# Patient Record
Sex: FEMALE | Race: WHITE | Employment: OTHER | URBAN - NONMETROPOLITAN AREA
[De-identification: names, ages, dates, MRNs, and addresses within clinical notes are randomized per-mention and may not be internally consistent; named-entity substitution may affect disease eponyms.]

---

## 2017-05-05 ENCOUNTER — OFFICE VISIT (OUTPATIENT)
Dept: NEUROLOGY | Age: 72
End: 2017-05-05
Payer: MEDICARE

## 2017-05-05 ENCOUNTER — TELEPHONE (OUTPATIENT)
Dept: NEUROLOGY | Age: 72
End: 2017-05-05

## 2017-05-05 VITALS
DIASTOLIC BLOOD PRESSURE: 76 MMHG | WEIGHT: 143 LBS | HEIGHT: 67 IN | SYSTOLIC BLOOD PRESSURE: 114 MMHG | BODY MASS INDEX: 22.44 KG/M2

## 2017-05-05 DIAGNOSIS — Z94.4 HISTORY OF LIVER TRANSPLANT (HCC): ICD-10-CM

## 2017-05-05 DIAGNOSIS — G20 PARKINSON DISEASE (HCC): Primary | ICD-10-CM

## 2017-05-05 DIAGNOSIS — Z86.79 HISTORY OF HYPERTENSION: ICD-10-CM

## 2017-05-05 RX ORDER — SPIRONOLACTONE 25 MG/1
25 TABLET ORAL DAILY
COMMUNITY

## 2017-05-05 RX ORDER — ISOSORBIDE MONONITRATE 120 MG/1
120 TABLET, EXTENDED RELEASE ORAL DAILY
COMMUNITY

## 2017-05-05 RX ORDER — MAGNESIUM GLUCONATE 27 MG(500)
500 TABLET ORAL DAILY
COMMUNITY

## 2017-05-05 RX ORDER — TRAMADOL HYDROCHLORIDE 50 MG/1
50 TABLET ORAL EVERY 6 HOURS PRN
COMMUNITY

## 2017-05-05 RX ORDER — SIROLIMUS 1 MG/1
1 TABLET, FILM COATED ORAL DAILY
COMMUNITY

## 2017-05-05 RX ORDER — CARVEDILOL 12.5 MG/1
12.5 TABLET ORAL 2 TIMES DAILY WITH MEALS
COMMUNITY

## 2017-05-05 RX ORDER — FUROSEMIDE 40 MG/1
40 TABLET ORAL 2 TIMES DAILY
COMMUNITY

## 2017-05-05 RX ORDER — ATORVASTATIN CALCIUM 20 MG/1
20 TABLET, FILM COATED ORAL DAILY
COMMUNITY

## 2017-06-02 ENCOUNTER — OFFICE VISIT (OUTPATIENT)
Dept: CARDIOLOGY | Facility: CLINIC | Age: 72
End: 2017-06-02

## 2017-06-02 VITALS
OXYGEN SATURATION: 95 % | DIASTOLIC BLOOD PRESSURE: 74 MMHG | HEART RATE: 64 BPM | WEIGHT: 147 LBS | SYSTOLIC BLOOD PRESSURE: 132 MMHG | HEIGHT: 67 IN | BODY MASS INDEX: 23.07 KG/M2

## 2017-06-02 DIAGNOSIS — I10 ESSENTIAL HYPERTENSION: ICD-10-CM

## 2017-06-02 DIAGNOSIS — I50.30 (HFPEF) HEART FAILURE WITH PRESERVED EJECTION FRACTION (HCC): Primary | ICD-10-CM

## 2017-06-02 PROCEDURE — 99213 OFFICE O/P EST LOW 20 MIN: CPT | Performed by: INTERNAL MEDICINE

## 2017-06-02 RX ORDER — TRAMADOL HYDROCHLORIDE 50 MG/1
TABLET ORAL EVERY 6 HOURS
COMMUNITY
End: 2022-08-09

## 2017-06-02 RX ORDER — MAGNESIUM GLUCONATE 27 MG(500)
TABLET ORAL
COMMUNITY

## 2017-06-02 NOTE — PROGRESS NOTES
Chief complaint : Cardiomyopathy    History of Present Illness 71-year-old female who comes today for follow-up visit.  Patient has no shortness of breath orthopnea or PND.  She denies chest pain or chest discomfort.       Review of Systems   Constitution: Negative. Negative for decreased appetite, diaphoresis, weakness, night sweats, weight gain and weight loss.   HENT: Negative for headaches, hearing loss, nosebleeds and sore throat.    Eyes: Negative.  Negative for blurred vision and photophobia.   Cardiovascular: Positive for leg swelling. Negative for chest pain, claudication, dyspnea on exertion, irregular heartbeat, palpitations, paroxysmal nocturnal dyspnea and syncope.   Respiratory: Negative for cough, hemoptysis, shortness of breath and wheezing.    Endocrine: Negative for cold intolerance, heat intolerance, polydipsia, polyphagia and polyuria.   Hematologic/Lymphatic: Negative.    Skin: Negative for color change, dry skin, flushing, itching and rash.   Musculoskeletal: Negative.  Negative for muscle cramps, muscle weakness and myalgias.   Gastrointestinal: Negative for abdominal pain, change in bowel habit, diarrhea, hematemesis, melena, nausea and vomiting.   Genitourinary: Negative for dysuria, frequency and hematuria.   Neurological: Negative for dizziness, focal weakness, light-headedness, loss of balance, numbness and seizures.   Psychiatric/Behavioral: Negative.  Negative for substance abuse, suicidal ideas and thoughts of violence.   Allergic/Immunologic: Negative.        Past Medical History:   Diagnosis Date   • Arthritis    • Backache    • Bilateral pleural effusion    • Blood in feces     Blood in feces symptom   • Capsulitis    • Cardiomyopathy      HFpEF, improved      • Chest pain    • CHF (congestive heart failure)    • Chronic anemia    • Chronic hepatitis C    • Degenerative joint disease involving multiple joints    • Dilated cardiomyopathy    • Dyslipidemia    • Dyspnea    • Edema of  "leg    • Epistaxis    • Essential hypertension    • Fibromyalgia, primary    • GERD (gastroesophageal reflux disease)    • H/O endoscopy 06/30/2014    Colon endoscopy 82210   • Headache    • Hemorrhoids     internal & external   • History of liver recipient     S/P done at [facility] 2003      • Hyperlipemia    • Hyperlipidemia    • Hypertension    • Hypokalemia    • Left bundle branch block    • Metatarsalgia     Metatarsalgia - with fat pad atrophie      • Pap smear for cervical cancer screening 02/09/1996   • Paroxysmal atrial fibrillation    • Polyp of sigmoid colon    • Salmonella arthritis    • Transient cerebral ischemia     Unspecified       Family History   Problem Relation Age of Onset   • Heart attack Father    • Stroke Father    • Cancer Other      Other   • Heart disease Other    • Hypertension Other    • Cholelithiasis Other        Codeine; Lortab [hydrocodone-acetaminophen]; and Penicillins     reports that she has never smoked. She has never used smokeless tobacco. She reports that she does not drink alcohol or use illicit drugs.    Objective     Vital Signs  6/2/17 12/1/16 8/16/16    /74 138/74 120/80   Heart Rate 64 75    SpO2 95 % 95 %    Weight 147 lb (66.7 kg) 150 lb 8 oz (68.3 kg) 159 lb 1.6 oz (72.2 kg)   Height 67\" (170.2 cm) 67\" (170.2 cm) 67.5\" (171.5 cm)   BMI (Calculated) 23 23.6 24.5   BSA (Calculated - sq m) 1.77 sq meters 1.79 sq meters 1.84 sq meters   Pain Score Zero Zero Seven         Physical Exam   Constitutional: She is oriented to person, place, and time. She appears well-developed and well-nourished.   HENT:   Head: Normocephalic and atraumatic.   Eyes: Conjunctivae and EOM are normal. Pupils are equal, round, and reactive to light.   Neck: Neck supple. No JVD present. Carotid bruit is not present. No tracheal deviation and no edema present.   Cardiovascular: Normal rate, regular rhythm, S1 normal, S2 normal, normal heart sounds and intact distal pulses.  Exam " reveals no gallop, no S3, no S4 and no friction rub.    No murmur heard.  Pulmonary/Chest: Effort normal and breath sounds normal. She has no wheezes. She has no rales. She exhibits no tenderness.   Abdominal: Bowel sounds are normal. She exhibits no abdominal bruit and no pulsatile midline mass. There is no rebound and no guarding.   Musculoskeletal: Normal range of motion. She exhibits no edema.   Neurological: She is alert and oriented to person, place, and time.   Skin: Skin is warm and dry.   Psychiatric: She has a normal mood and affect.       Procedures    Assessment/Plan     Patient Active Problem List   Diagnosis   • Paroxysmal atrial fibrillation   • (HFpEF) heart failure with preserved ejection fraction   • Essential hypertension   • Transient cerebral ischemia   ·  Liver transplantation 2003    • Left bundle branch block     1. (HFpEF) heart failure with preserved ejection fraction  Patient's last echocardiogram is from March 2014.  Her left ventricular systolic function appears to be within normal limits with estimated ejection fraction of 50-55%.  Plan:  · Continue with current guideline direct medical therapy.  · Continue with low sodium diet  · Continue with risk factor modification      2. Essential hypertension  Patient's blood pressure is well-controlled.  Plan:  · Continue with current guideline direct medical therapy  · Continue with risk factor modification  · Continue with low sodium diet    I discussed the patients findings and my recommendations with patient.          This document has been electronically signed by Abel Baltazar MD on June 25, 2017 11:28 AM     Abel Baltazar MD  06/25/17  11:25 AM

## 2017-08-07 ENCOUNTER — TELEPHONE (OUTPATIENT)
Dept: NEUROLOGY | Age: 72
End: 2017-08-07

## 2017-08-28 ENCOUNTER — HOSPITAL ENCOUNTER (OUTPATIENT)
Dept: ULTRASOUND IMAGING | Facility: HOSPITAL | Age: 72
Discharge: HOME | End: 2017-08-28
Admitting: INTERNAL MEDICINE

## 2017-08-28 DIAGNOSIS — R94.4 ABNORMAL KIDNEY FUNCTION STUDY: ICD-10-CM

## 2017-08-28 PROCEDURE — 76770 US EXAM ABDO BACK WALL COMP: CPT

## 2018-01-31 ENCOUNTER — OFFICE VISIT (OUTPATIENT)
Dept: CARDIOLOGY | Facility: CLINIC | Age: 73
End: 2018-01-31

## 2018-01-31 VITALS
DIASTOLIC BLOOD PRESSURE: 72 MMHG | HEIGHT: 67 IN | WEIGHT: 146 LBS | BODY MASS INDEX: 22.91 KG/M2 | HEART RATE: 75 BPM | SYSTOLIC BLOOD PRESSURE: 132 MMHG

## 2018-01-31 DIAGNOSIS — I10 ESSENTIAL HYPERTENSION: ICD-10-CM

## 2018-01-31 DIAGNOSIS — G45.9 TRANSIENT CEREBRAL ISCHEMIA, UNSPECIFIED TYPE: ICD-10-CM

## 2018-01-31 DIAGNOSIS — I48.0 PAROXYSMAL ATRIAL FIBRILLATION (HCC): Primary | ICD-10-CM

## 2018-01-31 DIAGNOSIS — I50.30 (HFPEF) HEART FAILURE WITH PRESERVED EJECTION FRACTION (HCC): ICD-10-CM

## 2018-01-31 DIAGNOSIS — I44.7 LEFT BUNDLE BRANCH BLOCK: ICD-10-CM

## 2018-01-31 PROCEDURE — 99213 OFFICE O/P EST LOW 20 MIN: CPT | Performed by: INTERNAL MEDICINE

## 2018-01-31 NOTE — PROGRESS NOTES
Dora Velazquez  72 y.o. female    01/31/2018  1. Paroxysmal atrial fibrillation    2. (HFpEF) heart failure with preserved ejection fraction    3. Essential hypertension    4. Transient cerebral ischemia, unspecified type    5. Left bundle branch block        History of Present Illness:Routine follow-up    72 years old patient evaluated with Dr. Baltazar in the past with history of hypertension, hypertensive heart disease, diastolic dysfunction, Parkinson disease, liver transplant and history of paroxysmal atrial fibrillation was on oral anticoagulation discontinued due to significant GI bleed more than 3-4 years ago.  She also had history of paroxysmal 3 fibrillation no further recurrence.  Patient denies orthopnea, PND, nauseous, vomiting.  Denied polydipsia polyuria.  Denies any fever cough which was dysuria or hematuria.  She last echocardiogram study reported ejection fraction 30-35% 2014.  We'll arrange an echo cardiac study to reassess the left and a systolic function.        1. Depressed left ventricular systolic function with ejection fraction        of 30% to 35%.  2. Mild concentric left ventricular hypertrophy  3. Grade 1 diastolic dysfunction of the left ventricular myocardium    SUBJECTIVE    Allergies   Allergen Reactions   • Codeine    • Lortab [Hydrocodone-Acetaminophen]    • Penicillins          Past Medical History:   Diagnosis Date   • Arthritis    • Backache    • Bilateral pleural effusion    • Blood in feces     Blood in feces symptom   • Capsulitis    • Cardiomyopathy      HFpEF, improved      • Chest pain    • CHF (congestive heart failure)    • Chronic anemia    • Chronic hepatitis C    • Degenerative joint disease involving multiple joints    • Dilated cardiomyopathy    • Dyslipidemia    • Dyspnea    • Edema of leg    • Epistaxis    • Essential hypertension    • Fibromyalgia, primary    • GERD (gastroesophageal reflux disease)    • H/O endoscopy 06/30/2014    Colon endoscopy [id number]   •  Headache    • Hemorrhoids     internal & external   • History of liver recipient     S/P done at [facility] 2003      • Hyperlipemia    • Hyperlipidemia    • Hypertension    • Hypokalemia    • Left bundle branch block    • Metatarsalgia     Metatarsalgia - with fat pad atrophie      • Pap smear for cervical cancer screening 02/09/1996   • Paroxysmal atrial fibrillation    • Polyp of sigmoid colon    • Salmonella arthritis    • Transient cerebral ischemia     Unspecified         Past Surgical History:   Procedure Laterality Date   • BACK SURGERY  1995    Back Surgery (2)      • CARDIAC CATHETERIZATION  11/03/2005    Orders Not Yet Performed: 0       • CARPAL TUNNEL RELEASE  02/12/2007    Carpal tunnel surgery (1)     • CAUTERIZATION NASAL BLEEDERS  03/09/2000    Control nose/throat bleeding (1)      • CHOLECYSTECTOMY  2000   • LAPAROSCOPIC LYSIS OF ADHESIONS  07/16/1992    Laparoscopy; lysis of adhesions (1)      • LIVER BIOPSY  01/05/1998    Needle biopsy of liver 72367 (1)      • LIVER TRANSPLANTATION  2003    Anesth, for liver transplant (1)      • SALPINGO OOPHORECTOMY  1974    Salpingo-oophorectomy (1)      • SHOULDER SURGERY  08/29/2007    Shoulder surgery procedure (1)      • TOTAL ABDOMINAL HYSTERECTOMY  1968    Total abd hysterectomy (1)            Family History   Problem Relation Age of Onset   • Heart attack Father    • Stroke Father    • Cancer Other      Other   • Heart disease Other    • Hypertension Other    • Cholelithiasis Other          Social History     Social History   • Marital status:      Spouse name: N/A   • Number of children: N/A   • Years of education: N/A     Occupational History   • Not on file.     Social History Main Topics   • Smoking status: Never Smoker   • Smokeless tobacco: Never Used   • Alcohol use No   • Drug use: No   • Sexual activity: Not on file     Other Topics Concern   • Not on file     Social History Narrative         Current Outpatient Prescriptions  "  Medication Sig Dispense Refill   • aspirin 81 MG tablet Take 81 mg by mouth daily.     • atorvastatin (LIPITOR) 40 MG tablet Take 40 mg by mouth every night.     • carbidopa-levodopa (SINEMET)  MG per tablet Take 1/2 pill each am and afternoon for a week, then 1 each am and afternoon     • carvedilol (COREG) 25 MG tablet Take 25 mg by mouth 2 (two) times a day.     • furosemide (LASIX) 40 MG tablet Take 40 mg by mouth daily.     • gabapentin (NEURONTIN) 100 MG capsule Take 100 mg by mouth daily.     • isosorbide mononitrate (IMDUR) 120 MG 24 hr tablet Take 120 mg by mouth daily.     • magnesium gluconate (MAGONATE) 500 MG tablet Take  by mouth.     • sirolimus (RAPAMUNE) 0.5 MG tablet tablet Take 1 mg by mouth.     • spironolactone (ALDACTONE) 50 MG tablet Take 50 mg by mouth every morning.     • traMADol (ULTRAM) 50 MG tablet Take  by mouth Every 6 (Six) Hours.       No current facility-administered medications for this visit.          OBJECTIVE    /72  Pulse 75  Ht 170.2 cm (67.01\")  Wt 66.2 kg (146 lb)  BMI 22.86 kg/m2        Review of Systems     Constitutional:  Denies recent weight loss, weight gain, fever or chills, no change in exercise tolerance     HENT:  Denies any hearing loss, epistaxis, hoarseness, or difficulty speaking.     Eyes: Wears eyeglasses or contact lenses     Respiratory:  Denies dyspnea with exertion,no cough, wheezing, or hemoptysis.     Cardiovascular: See H&P    Gastrointestinal: History of liver transplant and GI bleeding.     Endocrine: Negative for cold intolerance, heat intolerance, polydipsia, polyphagia and polyuria. Denies any history of weight change, heat/cold intolerance, polydipsia, polyuria     Genitourinary: Negative.      Musculoskeletal: Denies any history of arthritic symptoms or back problems     Skin:  Denies any change in hair or nails, rashes, or skin lesions.     Allergic/Immunologic: Negative.  Negative for environmental allergies, food allergies " and immunocompromised state.     Neurological:  Parkinson disease    Hematological: Denies any food allergies, seasonal allergies, bleeding disorders, or lymphadenopathy.     Psychiatric/Behavioral: Denies any history of depression, substance abuse, or change in cognitive function.         Physical Exam     Constitutional: Cooperative, alert and oriented, well-developed, well-nourished, in no acute distress.     HENT:   Head: Normocephalic, normal hair patterns, no masses or tenderness.  Ears, Nose, and Throat: No gross abnormalities. No pallor or cyanosis. Dentition good.   Eyes: EOMS intact, PERRL, conjunctivae and lids unremarkable. Fundoscopic exam and visual fields not performed.   Neck: No palpable masses or adenopathy, no thyromegaly, no JVD, carotid pulses are full and equal bilaterally and without  Bruits.     Cardiovascular: Regular rhythm, S1 and S2 normal, no S3 or S4. Apical impulse not displaced. No murmurs, gallops, or rubs detected.     Pulmonary/Chest: Chest: normal symmetry, no tenderness to palpation, normal respiratory excursion, no intercostal retraction, no use of accessory muscles.            Pulmonary: Normal breath sounds. No rales or ronchi.    Abdominal: Abdomen soft, bowel sounds normoactive, no masses, no hepatosplenomegaly, non-tender, no bruits.     Musculoskeletal: No deformities, clubbing, cyanosis, erythema, or edema observed. There are no spinal abnormalities noted. Normal muscle strength and tone. Pulses full and equal in all extremities, no bruits auscultated.     Neurological: No gross motor or sensory deficits noted, affect appropriate, oriented to time, person, place.     Skin: Warm and dry to the touch, no apparent skin lesions or masses noted.     Psychiatric: She has a normal mood and affect. Her behavior is normal. Judgment and thought content normal.         Procedures      Lab Results   Component Value Date    WBC 9.8 11/13/2014    HGB 10.7 (L) 11/13/2014    HCT 34.7 (L)  11/13/2014    MCV 87.4 11/13/2014     11/13/2014     Lab Results   Component Value Date    GLUCOSE 102 (H) 11/15/2014    BUN 21 11/15/2014    CREATININE 1.1 (H) 11/15/2014    CO2 33 (H) 11/15/2014    CALCIUM 9.0 11/15/2014    ALBUMIN 3.0 (L) 11/13/2014    AST 17 11/13/2014    ALT 23 11/13/2014     No results found for: CHOL  No results found for: TRIG  No results found for: HDL  No results found for: LDLCALC  No results found for: LDL  No results found for: HDLLDLRATIO  No components found for: CHOLHDL  No results found for: HGBA1C  No results found for: TSH, U2GBXLR, N7ZCKWA, THYROIDAB        ASSESSMENT AND PLAN  Congestive heart failure compensated due to systolic and diastolic dysfunction#2 hypertension hypertensive heart disease #3 paroxysmal atrial fibrillation no further recurrence #4 Parkinson disease #5 status post liver transplant    Lately, no sign of any cardiac decompensation based on the clinical history physical finding.  Evidence of ongoing ischemia at the time of evaluation.  She is a pleased with the clinical outcome.  She had history of for GI bleed requiring discontinuation of oral intake ablation more than 3-4 years ago.  At present recommend to continue atorvastatin for management of hyperlipidemia, carbidopa 4 Parkinson disease, carvedilol and Lasix / aldactone with history of congestive heart failure on the basis of systolic and diastolic dysfunction compensated.  We'll see her back in 6 month R depends on patient clinical conditions.    Dora was seen today for follow-up.    Diagnoses and all orders for this visit:    Paroxysmal atrial fibrillation    (HFpEF) heart failure with preserved ejection fraction    Essential hypertension    Transient cerebral ischemia, unspecified type    Left bundle branch block        Lo Jade MD  1/31/2018  10:02 AM

## 2018-04-26 ENCOUNTER — OFFICE VISIT (OUTPATIENT)
Dept: NEUROLOGY | Age: 73
End: 2018-04-26
Payer: MEDICARE

## 2018-04-26 VITALS
BODY MASS INDEX: 22.29 KG/M2 | HEIGHT: 67 IN | DIASTOLIC BLOOD PRESSURE: 81 MMHG | WEIGHT: 142 LBS | HEART RATE: 71 BPM | SYSTOLIC BLOOD PRESSURE: 118 MMHG

## 2018-04-26 DIAGNOSIS — Z86.79 HISTORY OF HYPERTENSION: ICD-10-CM

## 2018-04-26 DIAGNOSIS — G20 PARKINSON DISEASE (HCC): Primary | ICD-10-CM

## 2018-04-26 DIAGNOSIS — Z94.4 HISTORY OF LIVER TRANSPLANT (HCC): ICD-10-CM

## 2018-04-26 PROCEDURE — 4040F PNEUMOC VAC/ADMIN/RCVD: CPT | Performed by: PSYCHIATRY & NEUROLOGY

## 2018-04-26 PROCEDURE — G8400 PT W/DXA NO RESULTS DOC: HCPCS | Performed by: PSYCHIATRY & NEUROLOGY

## 2018-04-26 PROCEDURE — 1123F ACP DISCUSS/DSCN MKR DOCD: CPT | Performed by: PSYCHIATRY & NEUROLOGY

## 2018-04-26 PROCEDURE — 1036F TOBACCO NON-USER: CPT | Performed by: PSYCHIATRY & NEUROLOGY

## 2018-04-26 PROCEDURE — 3017F COLORECTAL CA SCREEN DOC REV: CPT | Performed by: PSYCHIATRY & NEUROLOGY

## 2018-04-26 PROCEDURE — G8420 CALC BMI NORM PARAMETERS: HCPCS | Performed by: PSYCHIATRY & NEUROLOGY

## 2018-04-26 PROCEDURE — 99214 OFFICE O/P EST MOD 30 MIN: CPT | Performed by: PSYCHIATRY & NEUROLOGY

## 2018-04-26 PROCEDURE — 1090F PRES/ABSN URINE INCON ASSESS: CPT | Performed by: PSYCHIATRY & NEUROLOGY

## 2018-04-26 PROCEDURE — G8427 DOCREV CUR MEDS BY ELIG CLIN: HCPCS | Performed by: PSYCHIATRY & NEUROLOGY

## 2018-07-18 ENCOUNTER — OFFICE VISIT (OUTPATIENT)
Dept: CARDIOLOGY | Facility: CLINIC | Age: 73
End: 2018-07-18

## 2018-07-18 VITALS
WEIGHT: 144.44 LBS | HEART RATE: 82 BPM | HEIGHT: 65 IN | DIASTOLIC BLOOD PRESSURE: 70 MMHG | SYSTOLIC BLOOD PRESSURE: 128 MMHG | OXYGEN SATURATION: 97 % | BODY MASS INDEX: 24.07 KG/M2

## 2018-07-18 DIAGNOSIS — I44.7 LEFT BUNDLE BRANCH BLOCK: ICD-10-CM

## 2018-07-18 DIAGNOSIS — E78.5 HYPERLIPIDEMIA, UNSPECIFIED HYPERLIPIDEMIA TYPE: ICD-10-CM

## 2018-07-18 DIAGNOSIS — I50.30 (HFPEF) HEART FAILURE WITH PRESERVED EJECTION FRACTION (HCC): ICD-10-CM

## 2018-07-18 DIAGNOSIS — I10 ESSENTIAL HYPERTENSION: ICD-10-CM

## 2018-07-18 DIAGNOSIS — I48.0 PAROXYSMAL ATRIAL FIBRILLATION (HCC): Primary | ICD-10-CM

## 2018-07-18 PROCEDURE — 99213 OFFICE O/P EST LOW 20 MIN: CPT | Performed by: INTERNAL MEDICINE

## 2018-07-18 RX ORDER — ALLOPURINOL 100 MG/1
100 TABLET ORAL 3 TIMES DAILY
COMMUNITY
End: 2022-08-01

## 2018-10-16 ENCOUNTER — DOCUMENTATION (OUTPATIENT)
Dept: CARDIOLOGY | Facility: CLINIC | Age: 73
End: 2018-10-16

## 2018-10-17 ENCOUNTER — DOCUMENTATION (OUTPATIENT)
Dept: CARDIOLOGY | Facility: CLINIC | Age: 73
End: 2018-10-17

## 2018-10-17 NOTE — PROGRESS NOTES
I faxed patient lab orders to [facility] today per Patient request she was there to get them drawn.

## 2018-10-31 DIAGNOSIS — E78.5 HYPERLIPIDEMIA, UNSPECIFIED HYPERLIPIDEMIA TYPE: Primary | ICD-10-CM

## 2019-04-18 DIAGNOSIS — I48.0 PAROXYSMAL ATRIAL FIBRILLATION (HCC): Primary | ICD-10-CM

## 2019-04-19 ENCOUNTER — OFFICE VISIT (OUTPATIENT)
Dept: CARDIOLOGY | Facility: CLINIC | Age: 74
End: 2019-04-19

## 2019-04-19 VITALS
BODY MASS INDEX: 23.49 KG/M2 | OXYGEN SATURATION: 98 % | WEIGHT: 141 LBS | HEIGHT: 65 IN | HEART RATE: 99 BPM | DIASTOLIC BLOOD PRESSURE: 68 MMHG | SYSTOLIC BLOOD PRESSURE: 120 MMHG

## 2019-04-19 DIAGNOSIS — I44.7 LEFT BUNDLE BRANCH BLOCK: ICD-10-CM

## 2019-04-19 DIAGNOSIS — G45.9 TRANSIENT CEREBRAL ISCHEMIA, UNSPECIFIED TYPE: ICD-10-CM

## 2019-04-19 DIAGNOSIS — I50.30 (HFPEF) HEART FAILURE WITH PRESERVED EJECTION FRACTION (HCC): ICD-10-CM

## 2019-04-19 DIAGNOSIS — I48.0 PAROXYSMAL ATRIAL FIBRILLATION (HCC): Primary | ICD-10-CM

## 2019-04-19 DIAGNOSIS — I10 ESSENTIAL HYPERTENSION: ICD-10-CM

## 2019-04-19 DIAGNOSIS — E78.5 HYPERLIPIDEMIA, UNSPECIFIED HYPERLIPIDEMIA TYPE: ICD-10-CM

## 2019-04-19 PROCEDURE — 93000 ELECTROCARDIOGRAM COMPLETE: CPT | Performed by: INTERNAL MEDICINE

## 2019-04-19 PROCEDURE — 99214 OFFICE O/P EST MOD 30 MIN: CPT | Performed by: INTERNAL MEDICINE

## 2019-04-19 NOTE — PROGRESS NOTES
Dora Velazquez  73 y.o. female    04/19/2019  1. Paroxysmal atrial fibrillation (CMS/HCC)    2. (HFpEF) heart failure with preserved ejection fraction (CMS/HCC)    3. Essential hypertension    4. Transient cerebral ischemia, unspecified type    5. Hyperlipidemia, unspecified hyperlipidemia type    6. Left bundle branch block        History of Present Illness:    Patient's Body mass index is 23.46 kg/m². BMI is within normal parameters. No follow-up required.   .  73 years old patient evaluated by Dr. Baltazar in the past with history of hypertension, hypertensive heart disease, diastolic dysfunction, Parkinson disease, liver transplant did remarkably well and history of paroxysmal atrial fibrillation was on oral anticoagulation discontinued due to significant GI bleed more than 3-4 years ago.  She also had history of paroxysmal atrial fibrillation no further recurrence.  Patient denies orthopnea, PND, nauseous, vomiting.  Denied polydipsia polyuria.  Denies any fever cough which was dysuria or hematuria.   last echocardiogram study reported ejection fraction 30-35% 2014.Repeat  echo has ejection fraction improved to 45%  We'll arrange an echo cardiac study to reassess the left and a systolic function.     ECHO 7/11/2018  ·  Left atrial cavity size is mildly dilated.  · The left ventricular cavity is mildly dilated.  · Left ventricular systolic function is low normal. Estimated EF = 45%.  · Left ventricular diastolic dysfunction (grade I a) consistent with impaired relaxation.  Left ventricular wall thickness is consistent with mild concentric hypertrophy      ECHO 2014  1. Depressed left ventricular systolic function with ejection fraction        of 30% to 35%.  2. Mild concentric left ventricular hypertrophy  3. Grade 1 diastolic dysfunction of the left ventricular myocardium            SUBJECTIVE:    Allergies   Allergen Reactions   • Lortab [Hydrocodone-Acetaminophen] Other (See Comments)     Can cause problems  for liver has had transplant    • Penicillins Anaphylaxis and Hives   • Codeine Hives         Past Medical History:   Diagnosis Date   • Arthritis    • Backache    • Bilateral pleural effusion    • Blood in feces     Blood in feces symptom   • Capsulitis    • Cardiomyopathy (CMS/HCC)      HFpEF, improved      • Chest pain    • CHF (congestive heart failure) (CMS/HCC)    • Chronic anemia    • Chronic hepatitis C (CMS/HCC)    • Degenerative joint disease involving multiple joints    • Dilated cardiomyopathy (CMS/HCC)    • Dyslipidemia    • Dyspnea    • Edema of leg    • Epistaxis    • Essential hypertension    • Fibromyalgia, primary    • GERD (gastroesophageal reflux disease)    • H/O endoscopy 06/30/2014    Colon endoscopy [id number]   • Headache    • Hemorrhoids     internal & external   • History of liver recipient (CMS/HCC)     S/P done at [facility] 2003      • Hyperlipemia    • Hyperlipidemia    • Hypertension    • Hypokalemia    • Left bundle branch block    • Metatarsalgia     Metatarsalgia - with fat pad atrophie      • Pap smear for cervical cancer screening 02/09/1996   • Paroxysmal atrial fibrillation (CMS/HCC)    • Polyp of sigmoid colon    • Salmonella arthritis (CMS/HCC)    • Transient cerebral ischemia     Unspecified         Past Surgical History:   Procedure Laterality Date   • BACK SURGERY  1995    Back Surgery (2)      • CARDIAC CATHETERIZATION  11/03/2005    Orders Not Yet Performed: 0       • CARPAL TUNNEL RELEASE  02/12/2007    Carpal tunnel surgery (1)     • CAUTERIZATION NASAL BLEEDERS  03/09/2000    Control nose/throat bleeding (1)      • CHOLECYSTECTOMY  2000   • LAPAROSCOPIC LYSIS OF ADHESIONS  07/16/1992    Laparoscopy; lysis of adhesions (1)      • LIVER BIOPSY  01/05/1998    Needle biopsy of liver 13845 (1)      • LIVER TRANSPLANTATION  2003    Anesth, for liver transplant (1)      • SALPINGO OOPHORECTOMY  1974    Salpingo-oophorectomy (1)      • SHOULDER SURGERY  08/29/2007    Shoulder  surgery procedure (1)      • TOTAL ABDOMINAL HYSTERECTOMY  1968    Total abd hysterectomy (1)            Family History   Problem Relation Age of Onset   • Heart attack Father    • Stroke Father    • Cancer Other         Other   • Heart disease Other    • Hypertension Other    • Cholelithiasis Other          Social History     Socioeconomic History   • Marital status:      Spouse name: Not on file   • Number of children: Not on file   • Years of education: Not on file   • Highest education level: Not on file   Tobacco Use   • Smoking status: Never Smoker   • Smokeless tobacco: Never Used   Substance and Sexual Activity   • Alcohol use: No   • Drug use: No         Current Outpatient Medications   Medication Sig Dispense Refill   • allopurinol (ZYLOPRIM) 100 MG tablet Take 100 mg by mouth 3 (Three) Times a Day.     • aspirin 81 MG tablet Take 81 mg by mouth daily.     • atorvastatin (LIPITOR) 40 MG tablet Take 40 mg by mouth every night.     • carbidopa-levodopa (SINEMET)  MG per tablet Take 1/2 pill each am and afternoon for a week, then 1 each am and afternoon     • carvedilol (COREG) 25 MG tablet Take 25 mg by mouth 2 (two) times a day.     • furosemide (LASIX) 40 MG tablet Take 40 mg by mouth daily.     • isosorbide mononitrate (IMDUR) 120 MG 24 hr tablet Take 120 mg by mouth daily.     • magnesium gluconate (MAGONATE) 500 MG tablet Take  by mouth.     • sirolimus (RAPAMUNE) 0.5 MG tablet tablet Take 1 mg by mouth.     • spironolactone (ALDACTONE) 50 MG tablet Take 50 mg by mouth every morning.     • traMADol (ULTRAM) 50 MG tablet Take  by mouth Every 6 (Six) Hours.       No current facility-administered medications for this visit.            Review of Systems:     Constitutional:  Denies recent weight loss, weight gain, fever or chills, no change in exercise tolerance.     HENT:  Denies any hearing loss, epistaxis, hoarseness, or difficulty speaking.     Eyes: Wears eyeglasses or contact  "lenses.    Respiratory:  Denies dyspnea with exertion,no cough, wheezing, or hemoptysis.     Cardiovascular: See H&P    Gastrointestinal: History of GI bleed    Endocrine: Negative for cold intolerance, heat intolerance, polydipsia, polyphagia and polyuria. Denies any history of weight change, polydipsia, polyuria.     Genitourinary: Negative.      Musculoskeletal: Arthritis of the hip    Skin:  Denies any change in hair or nails, rashes, or skin lesions.     Allergic/Immunologic: Negative.  Negative for environmental allergies, food allergies and immunocompromised state.     Neurological:  Denies any history of recurrent headaches, positive for Parkinson disease    Hematological: Denies any food allergies, seasonal allergies, bleeding disorders, or lymphadenopathy.     Psychiatric/Behavioral: Denies any history of depression, substance abuse, or change in cognitive function.       OBJECTIVE:    /68   Pulse 99   Ht 165.1 cm (65\")   Wt 64 kg (141 lb)   LMP 07/18/1971 (Within Months)   SpO2 98%   BMI 23.46 kg/m²       Physical Exam:     Constitutional: Cooperative, alert and oriented, well-developed, well-nourished, in no acute distress.     HENT:   Head: Normocephalic, normal hair patterns, no masses or tenderness.  Ears, Nose, and Throat: No gross abnormalities. No pallor or cyanosis. Dentition good.   Eyes: EOMS intact, PERRL, conjunctivae and lids unremarkable. Fundoscopic exam and visual fields not performed.   Neck: No palpable masses or adenopathy, no thyromegaly, no JVD, carotid pulses are full and equal bilaterally and without  Bruits.     Cardiovascular: Regular rhythm, S1 and S2 normal, no S3 or S4. Apical impulse not displaced. No murmurs, gallops, or rubs detected.     Pulmonary/Chest: Chest: normal symmetry, no tenderness to palpation, normal respiratory excursion, no intercostal retraction, no use of accessory muscles. Pulmonary: Normal breath sounds. No rales or rhonchi.    Abdominal: " Abdomen soft, bowel sounds normoactive, no masses, no hepatosplenomegaly, non-tender, no bruits.     Musculoskeletal: No deformities, clubbing, cyanosis, erythema, or edema observed. There are no spinal abnormalities noted. Normal muscle strength and tone. Pulses full and equal in all extremities, no bruits auscultated.     Neurological: No gross motor or sensory deficits noted, affect appropriate, oriented to time, person, place.     Skin: Warm and dry to the touch, no apparent skin lesions or masses noted.     Psychiatric: She has a normal mood and affect. Her behavior is normal. Judgment and thought content normal.         Procedures      Lab Results   Component Value Date    WBC 9.8 11/13/2014    HGB 10.7 (L) 11/13/2014    HCT 34.7 (L) 11/13/2014    MCV 87.4 11/13/2014     11/13/2014     Lab Results   Component Value Date    GLUCOSE 102 (H) 11/15/2014    BUN 21 11/15/2014    CREATININE 1.1 (H) 11/15/2014    CO2 33 (H) 11/15/2014    CALCIUM 9.0 11/15/2014    ALBUMIN 3.0 (L) 11/13/2014    AST 17 11/13/2014    ALT 23 11/13/2014     No results found for: CHOL  No results found for: TRIG  No results found for: HDL  No components found for: LDLCALC  No results found for: LDL  No results found for: HDLLDLRATIO  No components found for: CHOLHDL  No results found for: HGBA1C  No results found for: TSH, A1FTGPY, F4YIABD, THYROIDAB        ASSESSMENT AND PLAN:  Congestive heart failure compensated due to systolic and diastolic dysfunction#2 hypertension hypertensive heart disease #3 paroxysmal atrial fibrillation no further recurrence #4 Parkinson disease #5 status post liver transplant     Clinically, no sign of any cardiac decompensation based on the clinical history physical finding.  No e vidence of ongoing ischemia at the time of evaluation.  She is a pleased with the clinical outcome.  She had history of for GI bleed requiring discontinuation of oral anticoagulation more than 3-4 years ago refused to be  restarted.  At present recommend to continue atorvastatin for management of hyperlipidemia, carbidopa for Parkinson disease, carvedilol and Lasix / aldactone with history of congestive heart failure on the basis of systolic and diastolic dysfunction compensated.  KG discussed with the patient        Dora was seen today for follow-up.    Diagnoses and all orders for this visit:    Paroxysmal atrial fibrillation (CMS/HCC)    (HFpEF) heart failure with preserved ejection fraction (CMS/HCC)    Essential hypertension    Transient cerebral ischemia, unspecified type    Hyperlipidemia, unspecified hyperlipidemia type    Left bundle branch block        Lo Jade MD  4/19/2019  1:00 PM

## 2019-06-26 ENCOUNTER — CONSULT (OUTPATIENT)
Dept: GASTROENTEROLOGY | Facility: CLINIC | Age: 74
End: 2019-06-26

## 2019-06-26 VITALS
HEIGHT: 64 IN | WEIGHT: 141.2 LBS | OXYGEN SATURATION: 97 % | SYSTOLIC BLOOD PRESSURE: 120 MMHG | BODY MASS INDEX: 24.11 KG/M2 | HEART RATE: 71 BPM | DIASTOLIC BLOOD PRESSURE: 70 MMHG

## 2019-06-26 DIAGNOSIS — Z86.010 ENCOUNTER FOR COLONOSCOPY DUE TO HISTORY OF COLONIC POLYP: Primary | ICD-10-CM

## 2019-06-26 DIAGNOSIS — Z12.11 ENCOUNTER FOR COLONOSCOPY DUE TO HISTORY OF COLONIC POLYP: Primary | ICD-10-CM

## 2019-06-26 PROCEDURE — S0260 H&P FOR SURGERY: HCPCS | Performed by: NURSE PRACTITIONER

## 2019-06-26 RX ORDER — SODIUM, POTASSIUM,MAG SULFATES 17.5-3.13G
1 SOLUTION, RECONSTITUTED, ORAL ORAL EVERY 12 HOURS
Qty: 2 BOTTLE | Refills: 0 | Status: SHIPPED | OUTPATIENT
Start: 2019-06-26 | End: 2019-08-14 | Stop reason: HOSPADM

## 2019-06-26 RX ORDER — DEXTROSE AND SODIUM CHLORIDE 5; .45 G/100ML; G/100ML
30 INJECTION, SOLUTION INTRAVENOUS CONTINUOUS PRN
Status: CANCELLED | OUTPATIENT
Start: 2019-08-14

## 2019-06-26 NOTE — PROGRESS NOTES
Chief Complaint   Patient presents with   • Recall letter       Subjective    Dora Velazquez is a 73 y.o. female. she is here today for follow-up.    History of Present Illness  73-year-old female presents to discuss screening colonoscopy.  Denies abdominal pain, nausea, vomiting or change within her bowel habits.  Previous colonoscopy was completed 6/30/2014 and noted small polyp which was removed from sigmoid colon.  Polyp was hyperplastic.  She has chronic A. Fib maintained on anticoagulation.  She has been cleared to come off prior to procedure.  Plan; schedule patient for screening colonoscopy due to personal history of hyperplastic polyp of sigmoid colon.       The following portions of the patient's history were reviewed and updated as appropriate:   Past Medical History:   Diagnosis Date   • Arthritis    • Backache    • Bilateral pleural effusion    • Blood in feces     Blood in feces symptom   • Capsulitis    • Cardiomyopathy (CMS/HCC)      HFpEF, improved      • Chest pain    • CHF (congestive heart failure) (CMS/HCC)    • Chronic anemia    • Chronic hepatitis C (CMS/HCC)    • Degenerative joint disease involving multiple joints    • Dilated cardiomyopathy (CMS/HCC)    • Dyslipidemia    • Dyspnea    • Edema of leg    • Epistaxis    • Essential hypertension    • Fibromyalgia, primary    • GERD (gastroesophageal reflux disease)    • H/O endoscopy 06/30/2014    Colon endoscopy [id number]   • Headache    • Hemorrhoids     internal & external   • History of liver recipient (CMS/HCC)     S/[facility] 2003      • Hyperlipemia    • Hyperlipidemia    • Hypertension    • Hypokalemia    • Left bundle branch block    • Metatarsalgia     Metatarsalgia - with fat pad atrophie      • Pap smear for cervical cancer screening 02/09/1996   • Paroxysmal atrial fibrillation (CMS/HCC)    • Polyp of sigmoid colon    • Salmonella arthritis (CMS/HCC)    • Transient cerebral ischemia     Unspecified     Past Surgical  History:   Procedure Laterality Date   • BACK SURGERY  1995    Back Surgery (2)      • CARDIAC CATHETERIZATION  11/03/2005    Orders Not Yet Performed: 0       • CARPAL TUNNEL RELEASE  02/12/2007    Carpal tunnel surgery (1)     • CAUTERIZATION NASAL BLEEDERS  03/09/2000    Control nose/throat bleeding (1)      • CHOLECYSTECTOMY  2000   • LAPAROSCOPIC LYSIS OF ADHESIONS  07/16/1992    Laparoscopy; lysis of adhesions (1)      • LIVER BIOPSY  01/05/1998    Needle biopsy of liver [id number] (1)      • LIVER TRANSPLANTATION  2003    Anesth, for liver transplant (1)      • SALPINGO OOPHORECTOMY  1974    Salpingo-oophorectomy (1)      • SHOULDER SURGERY  08/29/2007    Shoulder surgery procedure (1)      • TOTAL ABDOMINAL HYSTERECTOMY  1968    Total abd hysterectomy (1)        Family History   Problem Relation Age of Onset   • Heart attack Father    • Stroke Father    • Cancer Other         Other   • Heart disease Other    • Hypertension Other    • Cholelithiasis Other      OB History     No data available        Prior to Admission medications    Medication Sig Start Date End Date Taking? Authorizing Provider   allopurinol (ZYLOPRIM) 100 MG tablet Take 100 mg by mouth 3 (Three) Times a Day.   Yes Yvette Rueda MD   aspirin 81 MG tablet Take 81 mg by mouth daily.   Yes Yvette Rueda MD   atorvastatin (LIPITOR) 40 MG tablet Take 40 mg by mouth every night.   Yes ProviderYvette MD   carbidopa-levodopa (SINEMET)  MG per tablet Take 1/2 pill each am and afternoon for a week, then 1 each am and afternoon 5/8/17  Yes Yvette Rueda MD   carvedilol (COREG) 25 MG tablet Take 25 mg by mouth 2 (two) times a day.   Yes Yvette Rueda MD   furosemide (LASIX) 40 MG tablet Take 40 mg by mouth daily.   Yes Yvette Rueda MD   isosorbide mononitrate (IMDUR) 120 MG 24 hr tablet Take 120 mg by mouth daily.   Yes Yvette Rueda MD   magnesium gluconate (MAGONATE) 500 MG tablet Take  by  "mouth.   Yes Yvette Rueda MD   sirolimus (RAPAMUNE) 0.5 MG tablet tablet Take 1 mg by mouth.   Yes Yvette Rueda MD   spironolactone (ALDACTONE) 50 MG tablet Take 50 mg by mouth every morning.   Yes Yvette Rueda MD   traMADol (ULTRAM) 50 MG tablet Take  by mouth Every 6 (Six) Hours.   Yes Yvette Rueda MD     Allergies   Allergen Reactions   • Lortab [Hydrocodone-Acetaminophen] Other (See Comments)     Can cause problems for liver has had transplant    • Penicillins Anaphylaxis and Hives   • Codeine Hives     Social History     Socioeconomic History   • Marital status:      Spouse name: Not on file   • Number of children: Not on file   • Years of education: Not on file   • Highest education level: Not on file   Tobacco Use   • Smoking status: Never Smoker   • Smokeless tobacco: Never Used   Substance and Sexual Activity   • Alcohol use: No   • Drug use: No   • Sexual activity: Defer       Review of Systems  Review of Systems   Constitutional: Negative for activity change, appetite change, chills, diaphoresis, fatigue, fever and unexpected weight change.   HENT: Negative for sore throat and trouble swallowing.    Respiratory: Negative for shortness of breath.    Gastrointestinal: Negative for abdominal distention, abdominal pain, anal bleeding, blood in stool, constipation, diarrhea, nausea, rectal pain and vomiting.   Musculoskeletal: Negative for arthralgias.   Skin: Negative for pallor.   Neurological: Negative for light-headedness.        /70 (BP Location: Right arm, Patient Position: Sitting)   Pulse 71   Ht 162.6 cm (64\")   Wt 64 kg (141 lb 3.2 oz)   LMP 07/18/1971 (Within Months)   SpO2 97%   BMI 24.24 kg/m²     Objective    Physical Exam   Constitutional: She is oriented to person, place, and time. She appears well-developed and well-nourished. She is cooperative. No distress.   HENT:   Head: Normocephalic and atraumatic.   Neck: Normal range of motion. " Neck supple. No thyromegaly present.   Cardiovascular: Normal rate, regular rhythm and normal heart sounds.   Pulmonary/Chest: Effort normal and breath sounds normal. She has no wheezes. She has no rhonchi. She has no rales.   Abdominal: Soft. Normal appearance and bowel sounds are normal. She exhibits no distension. There is no hepatosplenomegaly. There is no tenderness. There is no rigidity and no guarding. No hernia.   Lymphadenopathy:     She has no cervical adenopathy.   Neurological: She is alert and oriented to person, place, and time.   Skin: Skin is warm, dry and intact. No rash noted. No pallor.   Psychiatric: She has a normal mood and affect. Her speech is normal.     Hospital Outpatient Visit on 07/10/2018   Component Date Value Ref Range Status   • BSA 07/10/2018 1.8  m^2 Final   • BH CV ECHO LAURITA - RVDD 07/10/2018 3.0  cm Final   • IVSd 07/10/2018 1.3  cm Final   • LVIDd 07/10/2018 3.5  cm Final   • LVIDs 07/10/2018 2.5  cm Final   • LVPWd 07/10/2018 1.3  cm Final   • IVS/LVPW 07/10/2018 1.0   Final   • FS 07/10/2018 28.6  % Final   • EDV(Teich) 07/10/2018 50.9  ml Final   • ESV(Teich) 07/10/2018 22.3  ml Final   • EF(Teich) 07/10/2018 56.1  % Final   • EDV(cubed) 07/10/2018 42.9  ml Final   • ESV(cubed) 07/10/2018 15.6  ml Final   • EF(cubed) 07/10/2018 63.6  % Final   • LV mass(C)d 07/10/2018 153.8  grams Final   • LV mass(C)dI 07/10/2018 86.9  grams/m^2 Final   • SV(Teich) 07/10/2018 28.5  ml Final   • SI(Teich) 07/10/2018 16.1  ml/m^2 Final   • SV(cubed) 07/10/2018 27.3  ml Final   • SI(cubed) 07/10/2018 15.4  ml/m^2 Final   • Ao root diam 07/10/2018 2.9  cm Final   • Ao root area 07/10/2018 6.6  cm^2 Final   • ACS 07/10/2018 2.0  cm Final   • LA dimension 07/10/2018 3.4  cm Final   • asc Aorta Diam 07/10/2018 2.9  cm Final   • LA/Ao 07/10/2018 1.2   Final   • LVOT diam 07/10/2018 2.0  cm Final   • LVOT area 07/10/2018 3.1  cm^2 Final   • LVOT area(traced) 07/10/2018 3.1  cm^2 Final   • Ao root  area (BSA corrected) 07/10/2018 1.6   Final   • MV E max jostin 07/10/2018 59.4  cm/sec Final   • MV A max jostin 07/10/2018 82.5  cm/sec Final   • MV E/A 07/10/2018 0.72   Final   • MV P1/2t max jostin 07/10/2018 64.7  cm/sec Final   • MV P1/2t 07/10/2018 54.8  msec Final   • MVA(P1/2t) 07/10/2018 4.0  cm^2 Final   • MV dec slope 07/10/2018 346.0  cm/sec^2 Final   • Ao pk jostin 07/10/2018 154.0  cm/sec Final   • Ao max PG 07/10/2018 9.5  mmHg Final   • Ao max PG (full) 07/10/2018 5.2  mmHg Final   • Ao V2 mean 07/10/2018 114.0  cm/sec Final   • Ao mean PG 07/10/2018 6.0  mmHg Final   • Ao mean PG (full) 07/10/2018 4.0  mmHg Final   • Ao V2 VTI 07/10/2018 28.0  cm Final   • [place](I,A) 07/10/2018 2.2  cm^2 Final   • [place](I,D) 07/10/2018 2.2  cm^2 Final   • [place](V,A) 07/10/2018 2.1  cm^2 Final   • [place](V,D) 07/10/2018 2.1  cm^2 Final   • LV V1 max PG 07/10/2018 4.3  mmHg Final   • LV V1 mean PG 07/10/2018 2.0  mmHg Final   • LV V1 max 07/10/2018 104.0  cm/sec Final   • LV V1 mean 07/10/2018 67.9  cm/sec Final   • LV V1 VTI 07/10/2018 19.6  cm Final   • MR max jostin 07/10/2018 523.0  cm/sec Final   • MR max PG 07/10/2018 109.4  mmHg Final   • SV(Ao) 07/10/2018 184.9  ml Final   • SI(Ao) 07/10/2018 104.6  ml/m^2 Final   • SV(LVOT) 07/10/2018 61.6  ml Final   • SI(LVOT) 07/10/2018 34.8  ml/m^2 Final   • PA V2 max 07/10/2018 108.0  cm/sec Final   • PA max PG 07/10/2018 4.7  mmHg Final   • TR max jostin 07/10/2018 258.0  cm/sec Final   • RVSP(TR) 07/10/2018 31.6  mmHg Final   • RAP systole 07/10/2018 5.0  mmHg Final   • MVA P1/2T LCG 07/10/2018 3.4  cm^2 Final   • BH CV ECHO LAURITA - BZI_BMI 07/10/2018 22.9  kilograms/m^2 Final   • [facility] 07/10/2018 1.8  m^2 Final   • BH CV ECHO LAURITA - BZI_METRIC_WEIGHT 07/10/2018 66.2  kg Final   • BH CV ECHO LAURITA - BZI_METRIC_HEIGHT 07/10/2018 170.2  cm Final   • Target HR (85%) 07/10/2018 125  bpm Final   • Max. Pred. HR (100%) 07/10/2018 147  bpm Final   • Echo EF Estimated 07/10/2018  45  % Final     Assessment/Plan      1. Encounter for colonoscopy due to history of colonic polyp    .       Orders placed during this encounter include:  Orders Placed This Encounter   Procedures   • Follow Anesthesia Guidelines / Standing Orders     Standing Status:   Future   • Obtain Informed Consent     Standing Status:   Future     Order Specific Question:   Informed Consent Given For     Answer:   COLONOSCOPY       COLONOSCOPY (N/A)    Review and/or summary of lab tests, radiology, procedures, medications. Review and summary of old records and obtaining of history. The risks and benefits of my recommendations, as well as other treatment options were discussed with the patient today. Questions were answered.    New Medications Ordered This Visit   Medications   • sodium-potassium-magnesium sulfates (SUPREP BOWEL PREP KIT) 17.5-3.13-1.6 GM/177ML solution oral solution     Sig: Take 1 bottle by mouth Every 12 (Twelve) Hours.     Dispense:  2 bottle     Refill:  0       Follow-up: Return in about 4 weeks (around 7/24/2019).          This document has been electronically signed by ROZ Manuel on June 26, 2019 9:04 AM             Results for orders placed or performed during the hospital encounter of 07/10/18   Adult Transthoracic Echo Complete W/ Cont if Necessary Per Protocol   Result Value Ref Range    BSA 1.8 m^2    BH CV ECHO LAURITA - RVDD 3.0 cm    IVSd 1.3 cm    LVIDd 3.5 cm    LVIDs 2.5 cm    LVPWd 1.3 cm    IVS/LVPW 1.0     FS 28.6 %    EDV(Teich) 50.9 ml    ESV(Teich) 22.3 ml    EF(Teich) 56.1 %    EDV(cubed) 42.9 ml    ESV(cubed) 15.6 ml    EF(cubed) 63.6 %    LV mass(C)d 153.8 grams    LV mass(C)dI 86.9 grams/m^2    SV(Teich) 28.5 ml    SI(Teich) 16.1 ml/m^2    SV(cubed) 27.3 ml    SI(cubed) 15.4 ml/m^2    Ao root diam 2.9 cm    Ao root area 6.6 cm^2    ACS 2.0 cm    LA dimension 3.4 cm    asc Aorta Diam 2.9 cm    LA/Ao 1.2     LVOT diam 2.0 cm    LVOT area 3.1 cm^2    LVOT area(traced) 3.1 cm^2     Ao root area (BSA corrected) 1.6     MV E max jostin 59.4 cm/sec    MV A max jostin 82.5 cm/sec    MV E/A 0.72     MV P1/2t max jostin 64.7 cm/sec    MV P1/2t 54.8 msec    MVA(P1/2t) 4.0 cm^2    MV dec slope 346.0 cm/sec^2    Ao pk jostin 154.0 cm/sec    Ao max PG 9.5 mmHg    Ao max PG (full) 5.2 mmHg    Ao V2 mean 114.0 cm/sec    Ao mean PG 6.0 mmHg    Ao mean PG (full) 4.0 mmHg    Ao V2 VTI 28.0 cm    GE[place]IA(I,A) 2.2 cm^2    [place]IA(I,D) 2.2 cm^2    [place]IA(V,A) 2.1 cm^2    [place]IA(V,D) 2.1 cm^2    LV V1 max PG 4.3 mmHg    LV V1 mean PG 2.0 mmHg    LV V1 max 104.0 cm/sec    LV V1 mean 67.9 cm/sec    LV V1 VTI 19.6 cm    MR max jostin 523.0 cm/sec    MR max .4 mmHg    SV(Ao) 184.9 ml    SI(Ao) 104.6 ml/m^2    SV(LVOT) 61.6 ml    SI(LVOT) 34.8 ml/m^2    PA V2 max 108.0 cm/sec    PA max PG 4.7 mmHg    TR max jostin 258.0 cm/sec    RVSP(TR) 31.6 mmHg    RAP systole 5.0 mmHg    MVA P1/2T LCG 3.4 cm^2     CV ECHO LAURITA - BZI_BMI 22.9 kilograms/m^2     CV ECHO LAURITA - BSA([facility]) 1.8 m^2     CV ECHO LAURITA - BZI_METRIC_WEIGHT 66.2 kg     CV ECHO LAURITA - BZI_METRIC_HEIGHT 170.2 cm    Target HR (85%) 125 bpm    Max. Pred. HR (100%) 147 bpm    Echo EF Estimated 45 %   Results for orders placed or performed in visit on 08/15/16    COLONOSCOPY   Result Value Ref Range     Colonoscopy Complete    Results for orders placed or performed during the hospital encounter of 11/12/14   CBC and Differential   Result Value Ref Range    WBC 9.8 3.2 - 9.8 x1000/uL    RBC 3.97 3.77 - 5.16 reilly/mm3    Hemoglobin 10.7 (L) 12.0 - 15.5 gm/dl    Hematocrit 34.7 (L) 35.0 - 45.0 %    MCV 87.4 80.0 - 98.0 fl    MCH 27.0 26.0 - 34.0 pg    MCHC 30.8 (L) 31.4 - 36.0 gm/dl    RDW 18.5 (H) 11.5 - 14.5 %    Platelets 276 150 - 450 x1000/mm3    MPV 10.5 8.0 - 12.0 fl    Neutrophil Rel % 71.5 37.0 - 80.0 %    Lymphocyte Rel % 16.8 10.0 - 50.0 %    Monocyte Rel % 7.3 0.0 - 12.0 %    Eosinophil Rel % 3.8 0.0 - 7.0 %    Basophil Rel % 0.2 0.0 - 2.0 %    Immature Granulocyte  Rel % 0.40 0.00 - 0.50 %    Neutrophils Absolute 7.00 2.00 - 8.60 x1000/uL    Lymphocytes Absolute 1.64 0.60 - 4.20 x1000/uL    Monocytes Absolute 0.71 0.00 - 0.90 x1000/uL    Eosinophils Absolute 0.37 0.00 - 0.70 x1000/uL    Basophils Absolute 0.02 0.00 - 0.20 x1000/uL    Immature Granulocytes Absolute 0.040 (H) 0.005 - 0.022 x1000/uL   POCT Glucose Fingerstick   Result Value Ref Range    Glucose 138 (H) 60 - 100 mg/dl   Prealbumin   Result Value Ref Range    Prealbumin 17.0 (L) 17.6 - 36.0 mg/dl   Comprehensive metabolic panel   Result Value Ref Range    Sodium 143 137 - 145 mmol/L    Potassium 4.0 3.5 - 5.1 mmol/L    Chloride 99 95 - 110 mmol/L    CO2 34 (H) 22 - 31 mmol/L    Anion Gap 10.0 5.0 - 15.0 mmol/L    Glucose 88 60 - 100 mg/dl    BUN 31 (H) 7 - 21 mg/dl    Creatinine 1.2 (H) 0.5 - 1.0 mg/dl    GFR MDRD Non African American 44 39 - 90 mL/min/1.73 sq.M    GFR MDRD African American 54 39 - 90 mL/min/1.73 sq.M    Calcium 8.8 8.4 - 10.2 mg/dl    Total Protein 5.9 (L) 6.3 - 8.6 gm/dl    Albumin 3.0 (L) 3.4 - 4.8 gm/dl    Total Bilirubin 0.9 0.2 - 1.3 mg/dl    Alkaline Phosphatase 140 (H) 38 - 126 U/L    AST (SGOT) 17 14 - 36 U/L    ALT (SGPT) 23 9 - 52 U/L     *Note: Due to a large number of results and/or encounters for the requested time period, some results have not been displayed. A complete set of results can be found in Results Review.

## 2019-07-10 ENCOUNTER — HOSPITAL ENCOUNTER (EMERGENCY)
Facility: HOSPITAL | Age: 74
Discharge: HOME | End: 2019-07-11
Attending: EMERGENCY MEDICINE | Admitting: EMERGENCY MEDICINE

## 2019-07-10 ENCOUNTER — APPOINTMENT (OUTPATIENT)
Dept: CT IMAGING | Facility: HOSPITAL | Age: 74
End: 2019-07-10

## 2019-07-10 DIAGNOSIS — N39.0 ACUTE UTI: Primary | ICD-10-CM

## 2019-07-10 LAB
ALBUMIN SERPL-MCNC: 4 G/DL (ref 3.5–5.2)
ALBUMIN/GLOB SERPL: 1.3 G/DL
ALP SERPL-CCNC: 185 U/L (ref 39–117)
ALT SERPL W P-5'-P-CCNC: 9 U/L (ref 1–33)
ANION GAP SERPL CALCULATED.3IONS-SCNC: 17 MMOL/L (ref 5–15)
AST SERPL-CCNC: 25 U/L (ref 1–32)
BASOPHILS # BLD AUTO: 0.01 10*3/MM3 (ref 0–0.2)
BASOPHILS NFR BLD AUTO: 0.1 % (ref 0–1.5)
BILIRUB SERPL-MCNC: 0.8 MG/DL (ref 0.2–1.2)
BUN BLD-MCNC: 42 MG/DL (ref 8–23)
BUN/CREAT SERPL: 24.6 (ref 7–25)
CALCIUM SPEC-SCNC: 9.5 MG/DL (ref 8.6–10.5)
CHLORIDE SERPL-SCNC: 97 MMOL/L (ref 98–107)
CO2 SERPL-SCNC: 24 MMOL/L (ref 22–29)
CREAT BLD-MCNC: 1.71 MG/DL (ref 0.57–1)
DEPRECATED RDW RBC AUTO: 50.7 FL (ref 37–54)
EOSINOPHIL # BLD AUTO: 0.09 10*3/MM3 (ref 0–0.4)
EOSINOPHIL NFR BLD AUTO: 1 % (ref 0.3–6.2)
ERYTHROCYTE [DISTWIDTH] IN BLOOD BY AUTOMATED COUNT: 14.9 % (ref 12.3–15.4)
GFR SERPL CREATININE-BSD FRML MDRD: 29 ML/MIN/1.73
GLOBULIN UR ELPH-MCNC: 3 GM/DL
GLUCOSE BLD-MCNC: 159 MG/DL (ref 65–99)
HCT VFR BLD AUTO: 43.1 % (ref 34–46.6)
HGB BLD-MCNC: 14.3 G/DL (ref 12–15.9)
IMM GRANULOCYTES # BLD AUTO: 0.03 10*3/MM3 (ref 0–0.05)
IMM GRANULOCYTES NFR BLD AUTO: 0.3 % (ref 0–0.5)
LIPASE SERPL-CCNC: 50 U/L (ref 13–60)
LYMPHOCYTES # BLD AUTO: 0.79 10*3/MM3 (ref 0.7–3.1)
LYMPHOCYTES NFR BLD AUTO: 9.1 % (ref 19.6–45.3)
MCH RBC QN AUTO: 30.7 PG (ref 26.6–33)
MCHC RBC AUTO-ENTMCNC: 33.2 G/DL (ref 31.5–35.7)
MCV RBC AUTO: 92.5 FL (ref 79–97)
MONOCYTES # BLD AUTO: 0.79 10*3/MM3 (ref 0.1–0.9)
MONOCYTES NFR BLD AUTO: 9.1 % (ref 5–12)
NEUTROPHILS # BLD AUTO: 6.98 10*3/MM3 (ref 1.7–7)
NEUTROPHILS NFR BLD AUTO: 80.4 % (ref 42.7–76)
NRBC BLD AUTO-RTO: 0 /100 WBC (ref 0–0.2)
PLATELET # BLD AUTO: 185 10*3/MM3 (ref 140–450)
PMV BLD AUTO: 10.1 FL (ref 6–12)
POTASSIUM BLD-SCNC: 4.3 MMOL/L (ref 3.5–5.2)
PROT SERPL-MCNC: 7 G/DL (ref 6–8.5)
RBC # BLD AUTO: 4.66 10*6/MM3 (ref 3.77–5.28)
SODIUM BLD-SCNC: 138 MMOL/L (ref 136–145)
TROPONIN T SERPL-MCNC: <0.01 NG/ML (ref 0–0.03)
WBC NRBC COR # BLD: 8.69 10*3/MM3 (ref 3.4–10.8)

## 2019-07-10 PROCEDURE — 84484 ASSAY OF TROPONIN QUANT: CPT | Performed by: EMERGENCY MEDICINE

## 2019-07-10 PROCEDURE — 81001 URINALYSIS AUTO W/SCOPE: CPT | Performed by: EMERGENCY MEDICINE

## 2019-07-10 PROCEDURE — 80053 COMPREHEN METABOLIC PANEL: CPT | Performed by: EMERGENCY MEDICINE

## 2019-07-10 PROCEDURE — 85025 COMPLETE CBC W/AUTO DIFF WBC: CPT | Performed by: EMERGENCY MEDICINE

## 2019-07-10 PROCEDURE — 93005 ELECTROCARDIOGRAM TRACING: CPT | Performed by: EMERGENCY MEDICINE

## 2019-07-10 PROCEDURE — 83690 ASSAY OF LIPASE: CPT | Performed by: EMERGENCY MEDICINE

## 2019-07-10 PROCEDURE — 93010 ELECTROCARDIOGRAM REPORT: CPT | Performed by: INTERNAL MEDICINE

## 2019-07-10 PROCEDURE — 99284 EMERGENCY DEPT VISIT MOD MDM: CPT

## 2019-07-10 RX ORDER — SODIUM CHLORIDE 0.9 % (FLUSH) 0.9 %
10 SYRINGE (ML) INJECTION AS NEEDED
Status: DISCONTINUED | OUTPATIENT
Start: 2019-07-10 | End: 2019-07-11 | Stop reason: HOSPADM

## 2019-07-11 ENCOUNTER — APPOINTMENT (OUTPATIENT)
Dept: CT IMAGING | Facility: HOSPITAL | Age: 74
End: 2019-07-11

## 2019-07-11 VITALS
HEIGHT: 64 IN | HEART RATE: 92 BPM | DIASTOLIC BLOOD PRESSURE: 59 MMHG | WEIGHT: 153.1 LBS | RESPIRATION RATE: 18 BRPM | OXYGEN SATURATION: 95 % | TEMPERATURE: 99.4 F | SYSTOLIC BLOOD PRESSURE: 112 MMHG | BODY MASS INDEX: 26.14 KG/M2

## 2019-07-11 LAB
BACTERIA UR QL AUTO: ABNORMAL /HPF
BILIRUB UR QL STRIP: NEGATIVE
CLARITY UR: ABNORMAL
COLOR UR: YELLOW
GLUCOSE UR STRIP-MCNC: NEGATIVE MG/DL
HGB UR QL STRIP.AUTO: NEGATIVE
HOLD SPECIMEN: NORMAL
HYALINE CASTS UR QL AUTO: ABNORMAL /LPF
KETONES UR QL STRIP: NEGATIVE
LEUKOCYTE ESTERASE UR QL STRIP.AUTO: ABNORMAL
NITRITE UR QL STRIP: NEGATIVE
PH UR STRIP.AUTO: 5.5 [PH] (ref 5–9)
PROT UR QL STRIP: NEGATIVE
RBC # UR: ABNORMAL /HPF
REF LAB TEST METHOD: ABNORMAL
SP GR UR STRIP: 1.01 (ref 1–1.03)
SQUAMOUS #/AREA URNS HPF: ABNORMAL /HPF
UROBILINOGEN UR QL STRIP: ABNORMAL
WBC UR QL AUTO: ABNORMAL /HPF
WHOLE BLOOD HOLD SPECIMEN: NORMAL

## 2019-07-11 PROCEDURE — 74176 CT ABD & PELVIS W/O CONTRAST: CPT

## 2019-07-11 PROCEDURE — 25010000002 CEFTRIAXONE PER 250 MG: Performed by: EMERGENCY MEDICINE

## 2019-07-11 PROCEDURE — 96365 THER/PROPH/DIAG IV INF INIT: CPT

## 2019-07-11 RX ORDER — CEFDINIR 300 MG/1
300 CAPSULE ORAL DAILY
Qty: 7 CAPSULE | Refills: 0 | Status: SHIPPED | OUTPATIENT
Start: 2019-07-11 | End: 2019-07-18

## 2019-07-11 RX ORDER — ONDANSETRON 4 MG/1
4 TABLET, ORALLY DISINTEGRATING ORAL EVERY 6 HOURS PRN
Qty: 10 TABLET | Refills: 0 | Status: SHIPPED | OUTPATIENT
Start: 2019-07-11 | End: 2022-02-08

## 2019-07-11 RX ADMIN — CEFTRIAXONE SODIUM 1 G: 1 INJECTION, POWDER, FOR SOLUTION INTRAMUSCULAR; INTRAVENOUS at 01:05

## 2019-07-11 NOTE — ED PROVIDER NOTES
Subjective   73-year-old white female presents to the emergency department via EMS with chief complaint of nausea and vomiting.  Patient relates she's been ill with nausea, vomiting, and diarrhea this evening.  She complains of mild abdominal pain.  She denies fever, sweats or chills.  She denies chest pain or shortness of breath.  She denies hematemesis or rectal bleeding.            Review of Systems   Constitutional: Positive for fatigue. Negative for chills, diaphoresis and fever.   Respiratory: Negative for shortness of breath.    Cardiovascular: Negative for chest pain.   Gastrointestinal: Positive for abdominal pain, diarrhea, nausea and vomiting. Negative for blood in stool.   Genitourinary: Negative for dysuria and hematuria.   Neurological: Positive for weakness. Negative for syncope and headaches.   All other systems reviewed and are negative.      Past Medical History:   Diagnosis Date   • Arthritis    • Backache    • Bilateral pleural effusion    • Blood in feces     Blood in feces symptom   • Capsulitis    • Cardiomyopathy (CMS/HCC)      HFpEF, improved      • Chest pain    • CHF (congestive heart failure) (CMS/HCC)    • Chronic anemia    • Chronic hepatitis C (CMS/HCC)    • Degenerative joint disease involving multiple joints    • Dilated cardiomyopathy (CMS/HCC)    • Dyslipidemia    • Dyspnea    • Edema of leg    • Epistaxis    • Essential hypertension    • Fibromyalgia, primary    • GERD (gastroesophageal reflux disease)    • H/O endoscopy 06/30/2014    Colon endoscopy 83915   • Headache    • Hemorrhoids     internal & external   • History of liver recipient (CMS/HCC)     S/P done at [facility] 2003      • Hyperlipemia    • Hyperlipidemia    • Hypertension    • Hypokalemia    • Left bundle branch block    • Metatarsalgia     Metatarsalgia - with fat pad atrophie      • Pap smear for cervical cancer screening 02/09/1996   • Paroxysmal atrial fibrillation (CMS/HCC)    • Polyp of sigmoid colon     • Salmonella arthritis (CMS/HCC)    • Transient cerebral ischemia     Unspecified       Allergies   Allergen Reactions   • Lortab [Hydrocodone-Acetaminophen] Other (See Comments)     Can cause problems for liver has had transplant    • Penicillins Anaphylaxis and Hives   • Codeine Hives       Past Surgical History:   Procedure Laterality Date   • BACK SURGERY  1995    Back Surgery (2)      • CARDIAC CATHETERIZATION  11/03/2005    Orders Not Yet Performed: 0       • CARPAL TUNNEL RELEASE  02/12/2007    Carpal tunnel surgery (1)     • CAUTERIZATION NASAL BLEEDERS  03/09/2000    Control nose/throat bleeding (1)      • CHOLECYSTECTOMY  2000   • LAPAROSCOPIC LYSIS OF ADHESIONS  07/16/1992    Laparoscopy; lysis of adhesions (1)      • LIVER BIOPSY  01/05/1998    Needle biopsy of liver 22096 (1)      • LIVER TRANSPLANTATION  2003    Anesth, for liver transplant (1)      • SALPINGO OOPHORECTOMY  1974    Salpingo-oophorectomy (1)      • SHOULDER SURGERY  08/29/2007    Shoulder surgery procedure (1)      • TOTAL ABDOMINAL HYSTERECTOMY  1968    Total abd hysterectomy (1)          Family History   Problem Relation Age of Onset   • Heart attack Father    • Stroke Father    • Cancer Other         Other   • Heart disease Other    • Hypertension Other    • Cholelithiasis Other        Social History     Socioeconomic History   • Marital status:      Spouse name: Not on file   • Number of children: Not on file   • Years of education: Not on file   • Highest education level: Not on file   Tobacco Use   • Smoking status: Never Smoker   • Smokeless tobacco: Never Used   Substance and Sexual Activity   • Alcohol use: No   • Drug use: No   • Sexual activity: Defer           Objective   Physical Exam   Constitutional: She is oriented to person, place, and time. She appears well-developed and well-nourished. No distress.   HENT:   Head: Normocephalic and atraumatic.   Right Ear: External ear normal.   Left Ear: External ear  normal.   Nose: Nose normal.   Mouth/Throat: Oropharynx is clear and moist.   Eyes: Conjunctivae and EOM are normal. Pupils are equal, round, and reactive to light.   Neck: Normal range of motion. Neck supple.   Cardiovascular: Normal rate, regular rhythm, normal heart sounds and intact distal pulses.   Pulmonary/Chest: Effort normal and breath sounds normal.   Abdominal: Soft. Bowel sounds are normal. She exhibits no distension and no mass. There is no rebound and no guarding.   Mild diffuse tenderness.   Musculoskeletal: Normal range of motion. She exhibits no edema or tenderness.   Neurological: She is alert and oriented to person, place, and time. No cranial nerve deficit or sensory deficit. She exhibits normal muscle tone.   Skin: Skin is warm and dry. She is not diaphoretic.   Psychiatric: She has a normal mood and affect. Her behavior is normal.   Nursing note and vitals reviewed.      ECG 12 Lead    Date/Time: 7/10/2019 11:17 PM  Performed by: Ibrahima Brothers MD  Authorized by: Ibrahima Brothers MD   Interpreted by physician  Clinical impression: abnormal ECG  Comments: Normal sinus rhythm rate of 90.  Left bundle branch block.                 ED Course  ED Course as of Jul 11 0044   Thu Jul 11, 2019   0041 Patient is alert and resting comfortably. I reviewed the results of the emergency department evaluation with the patient.  I recommended primary care follow-up.  I advised the patient to return to the emergency department if their symptoms change or worsen.   [DR]      ED Course User Index  [DR] Ibrahima Brothers MD      Labs Reviewed   COMPREHENSIVE METABOLIC PANEL - Abnormal; Notable for the following components:       Result Value    Glucose 159 (*)     BUN 42 (*)     Creatinine 1.71 (*)     Chloride 97 (*)     Alkaline Phosphatase 185 (*)     eGFR Non African Amer 29 (*)     Anion Gap 17.0 (*)     All other components within normal limits    Narrative:     GFR Normal >60  Chronic Kidney Disease <60  Kidney  Failure <15   URINALYSIS W/ MICROSCOPIC IF INDICATED (NO CULTURE) - Abnormal; Notable for the following components:    Appearance, UA Cloudy (*)     Leuk Esterase, UA Large (3+) (*)     All other components within normal limits   CBC WITH AUTO DIFFERENTIAL - Abnormal; Notable for the following components:    Neutrophil % 80.4 (*)     Lymphocyte % 9.1 (*)     All other components within normal limits   URINALYSIS, MICROSCOPIC ONLY - Abnormal; Notable for the following components:    RBC, UA 6-12 (*)     WBC, UA 31-50 (*)     Bacteria, UA 1+ (*)     Squamous Epithelial Cells, UA 3-5 (*)     All other components within normal limits   TROPONIN (IN-HOUSE) - Normal    Narrative:     Troponin T Reference Range:  <= 0.03 ng/mL-   Negative for AMI  >0.03 ng/mL-     Abnormal for myocardial necrosis.  Clinicians would have to utilize clinical acumen, EKG, Troponin and serial changes to determine if it is an Acute Myocardial Infarction or myocardial injury due to an underlying chronic condition.    LIPASE - Normal   CBC AND DIFFERENTIAL    Narrative:     The following orders were created for panel order CBC & Differential.  Procedure                               Abnormality         Status                     ---------                               -----------         ------                     CBC Auto Differential[[id number]]        Abnormal            Final result                 Please view results for these tests on the individual orders.   EXTRA TUBES    Narrative:     The following orders were created for panel order Extra Tubes.  Procedure                               Abnormality         Status                     ---------                               -----------         ------                     Light Blue Top[[id number]]                                   Final result               Gold Top - [facility][[id number]]                                   Final result                 Please view results for these tests on the  individual orders.   LIGHT BLUE TOP   GOLD TOP - SST     Ct Abdomen Pelvis Without Contrast    Result Date: 7/11/2019  Narrative: PROCEDURE:  CT ABDOMEN PELVIS WO CONTRAST CLINICAL HISTORY:  73 years  Female  abdominal pain, vomiting, diarrhea  TECHNIQUE: Contiguous axial images obtained through the abdomen and pelvis without the administration of IV contrast.  Coronal and sagittal reformatted images provided.  This CT exam was performed according to our departmental dose-optimization program, which includes one or more of the following dose reduction techniques: automated exposure control, adjustment of the mA and/or kV according to patient size, and/or use of iterative reconstruction technique. COMPARISON:  No prior exams provided for comparison. FINDINGS: Mild scarring at the lung bases. Evidence of prior granulomatous disease in the lungs, liver, and spleen. Prior cholecystectomy without biliary dilatation. The unenhanced pancreas, adrenal glands, right kidney, and urinary bladder demonstrate no acute findings. Small left renal cyst. Prior hysterectomy. There is no bowel inflammation, obstruction, free intraperitoneal air, or ascites. Prior appendectomy. Atherosclerosis of the abdominal aorta without aneurysm or evidence of acute retroperitoneal hemorrhage. Postsurgical changes in the lumbar spine without acute fracture or aggressive osseous lesion. Calcified granulomata in the subcutaneous fat over the left buttock.     Impression: No acute abdominal or pelvic abnormalities. Chronic findings as described. Electronically signed by:  Yenny Bermeo MD  7/11/2019 12:37 AM CDT Workstation: [id number]              [facility]      Final diagnoses:   Acute UTI            Ibrahima Brothers MD  07/11/19 0044

## 2019-07-11 NOTE — ED NOTES
Patient presents to ED with complaint of nausea and vomiting. She states she is also having diarrhea. She states this started today. She arrived by EMS     Alana Kay RN  07/10/19 [id number]

## 2019-08-14 ENCOUNTER — ANESTHESIA (OUTPATIENT)
Dept: GASTROENTEROLOGY | Facility: HOSPITAL | Age: 74
End: 2019-08-14

## 2019-08-14 ENCOUNTER — ANESTHESIA EVENT (OUTPATIENT)
Dept: GASTROENTEROLOGY | Facility: HOSPITAL | Age: 74
End: 2019-08-14

## 2019-08-14 ENCOUNTER — HOSPITAL ENCOUNTER (OUTPATIENT)
Facility: HOSPITAL | Age: 74
Setting detail: HOSPITAL OUTPATIENT SURGERY
Discharge: HOME | End: 2019-08-14
Attending: INTERNAL MEDICINE | Admitting: INTERNAL MEDICINE

## 2019-08-14 VITALS
BODY MASS INDEX: 24.1 KG/M2 | HEART RATE: 70 BPM | OXYGEN SATURATION: 95 % | RESPIRATION RATE: 18 BRPM | WEIGHT: 141.13 LBS | HEIGHT: 64 IN | SYSTOLIC BLOOD PRESSURE: 133 MMHG | DIASTOLIC BLOOD PRESSURE: 63 MMHG | TEMPERATURE: 96.8 F

## 2019-08-14 DIAGNOSIS — Z12.11 ENCOUNTER FOR COLONOSCOPY DUE TO HISTORY OF COLONIC POLYP: ICD-10-CM

## 2019-08-14 DIAGNOSIS — Z86.010 ENCOUNTER FOR COLONOSCOPY DUE TO HISTORY OF COLONIC POLYP: ICD-10-CM

## 2019-08-14 PROCEDURE — 88305 TISSUE EXAM BY PATHOLOGIST: CPT | Performed by: PATHOLOGY

## 2019-08-14 PROCEDURE — 88305 TISSUE EXAM BY PATHOLOGIST: CPT | Performed by: INTERNAL MEDICINE

## 2019-08-14 PROCEDURE — 25010000002 PROPOFOL 10 MG/ML EMULSION: Performed by: NURSE ANESTHETIST, CERTIFIED REGISTERED

## 2019-08-14 PROCEDURE — 45385 COLONOSCOPY W/LESION REMOVAL: CPT | Performed by: INTERNAL MEDICINE

## 2019-08-14 RX ORDER — LIDOCAINE HYDROCHLORIDE 20 MG/ML
INJECTION, SOLUTION EPIDURAL; INFILTRATION; INTRACAUDAL; PERINEURAL AS NEEDED
Status: DISCONTINUED | OUTPATIENT
Start: 2019-08-14 | End: 2019-08-14 | Stop reason: SURG

## 2019-08-14 RX ORDER — DEXTROSE AND SODIUM CHLORIDE 5; .45 G/100ML; G/100ML
30 INJECTION, SOLUTION INTRAVENOUS CONTINUOUS PRN
Status: DISCONTINUED | OUTPATIENT
Start: 2019-08-14 | End: 2019-08-14 | Stop reason: HOSPADM

## 2019-08-14 RX ORDER — PROPOFOL 10 MG/ML
VIAL (ML) INTRAVENOUS AS NEEDED
Status: DISCONTINUED | OUTPATIENT
Start: 2019-08-14 | End: 2019-08-14 | Stop reason: SURG

## 2019-08-14 RX ADMIN — PROPOFOL 70 MG: 10 INJECTION, EMULSION INTRAVENOUS at 10:16

## 2019-08-14 RX ADMIN — DEXTROSE AND SODIUM CHLORIDE 30 ML/HR: 5; 450 INJECTION, SOLUTION INTRAVENOUS at 09:40

## 2019-08-14 RX ADMIN — LIDOCAINE HYDROCHLORIDE 50 MG: 20 INJECTION, SOLUTION EPIDURAL; INFILTRATION; INTRACAUDAL; PERINEURAL at 10:16

## 2019-08-14 RX ADMIN — PROPOFOL 30 MG: 10 INJECTION, EMULSION INTRAVENOUS at 10:25

## 2019-08-14 NOTE — ANESTHESIA PREPROCEDURE EVALUATION
Anesthesia Evaluation     Patient summary reviewed and Nursing notes reviewed   NPO Solid Status: > 8 hours  NPO Liquid Status: > 2 hours           Airway   No difficulty expected  Dental      Pulmonary - normal exam   Cardiovascular     Rhythm: irregular  Rate: abnormal        Neuro/Psych  GI/Hepatic/Renal/Endo      Musculoskeletal     Abdominal    Substance History      OB/GYN          Other                        Anesthesia Plan    ASA 3     MAC     intravenous induction   Anesthetic plan, all risks, benefits, and alternatives have been provided, discussed and informed consent has been obtained with: patient.    Plan discussed with CRNA.

## 2019-08-14 NOTE — H&P
No chief complaint on file.      Subjective    Dora Velazquez is a 74 y.o. female. she is here today for follow-up.    History of Present Illness  73-year-old female presents to discuss screening colonoscopy.  Denies abdominal pain, nausea, vomiting or change within her bowel habits.  Previous colonoscopy was completed 6/30/2014 and noted small polyp which was removed from sigmoid colon.  Polyp was hyperplastic.  She has chronic A. Fib maintained on anticoagulation.  She has been cleared to come off prior to procedure.  Plan; schedule patient for screening colonoscopy due to personal history of hyperplastic polyp of sigmoid colon.       The following portions of the patient's history were reviewed and updated as appropriate:   Past Medical History:   Diagnosis Date   • Arthritis    • Backache    • Bilateral pleural effusion    • Blood in feces     Blood in feces symptom   • Capsulitis    • Cardiomyopathy (CMS/HCC)      HFpEF, improved      • Chest pain    • CHF (congestive heart failure) (CMS/HCC)    • Chronic anemia    • Chronic hepatitis C (CMS/HCC)    • Degenerative joint disease involving multiple joints    • Dilated cardiomyopathy (CMS/HCC)    • Dyslipidemia    • Dyspnea    • Edema of leg    • Epistaxis    • Essential hypertension    • Fibromyalgia, primary    • GERD (gastroesophageal reflux disease)    • H/O endoscopy 06/30/2014    Colon endoscopy [id number]   • Headache    • Hemorrhoids     internal & external   • History of liver recipient (CMS/HCC)     S/P done at [facility] 2003      • Hyperlipemia    • Hyperlipidemia    • Hypertension    • Hypokalemia    • Left bundle branch block    • Metatarsalgia     Metatarsalgia - with fat pad atrophie      • Pap smear for cervical cancer screening 02/09/1996   • Paroxysmal atrial fibrillation (CMS/HCC)    • Polyp of sigmoid colon    • Salmonella arthritis (CMS/HCC)    • Transient cerebral ischemia     Unspecified     Past Surgical History:   Procedure Laterality  Date   • BACK SURGERY  1995    Back Surgery (2)      • CARDIAC CATHETERIZATION  11/03/2005    Orders Not Yet Performed: 0       • CARPAL TUNNEL RELEASE  02/12/2007    Carpal tunnel surgery (1)     • CAUTERIZATION NASAL BLEEDERS  03/09/2000    Control nose/throat bleeding (1)      • CHOLECYSTECTOMY  2000   • LAPAROSCOPIC LYSIS OF ADHESIONS  07/16/1992    Laparoscopy; lysis of adhesions (1)      • LIVER BIOPSY  01/05/1998    Needle biopsy of liver [id number] (1)      • LIVER TRANSPLANTATION  2003    Anesth, for liver transplant (1)      • SALPINGO OOPHORECTOMY  1974    Salpingo-oophorectomy (1)      • SHOULDER SURGERY  08/29/2007    Shoulder surgery procedure (1)      • TOTAL ABDOMINAL HYSTERECTOMY  1968    Total abd hysterectomy (1)        Family History   Problem Relation Age of Onset   • Heart attack Father    • Stroke Father    • Cancer Other         Other   • Heart disease Other    • Hypertension Other    • Cholelithiasis Other      OB History     No data available        Prior to Admission medications    Medication Sig Start Date End Date Taking? Authorizing Provider   allopurinol (ZYLOPRIM) 100 MG tablet Take 100 mg by mouth 3 (Three) Times a Day.   Yes Yvette Rueda MD   aspirin 81 MG tablet Take 81 mg by mouth daily.   Yes Yvette Rueda MD   atorvastatin (LIPITOR) 40 MG tablet Take 40 mg by mouth every night.   Yes Yvette Rueda MD   carbidopa-levodopa (SINEMET)  MG per tablet Take 1/2 pill each am and afternoon for a week, then 1 each am and afternoon 5/8/17  Yes Yvette Rueda MD   carvedilol (COREG) 25 MG tablet Take 25 mg by mouth 2 (two) times a day.   Yes Yvette Rueda MD   furosemide (LASIX) 40 MG tablet Take 40 mg by mouth daily.   Yes Yvette Rueda MD   isosorbide mononitrate (IMDUR) 120 MG 24 hr tablet Take 120 mg by mouth daily.   Yes Yvette Rueda MD   magnesium gluconate (MAGONATE) 500 MG tablet Take  by mouth.   Yes Yvette Rueda MD  "  sirolimus (RAPAMUNE) 0.5 MG tablet tablet Take 1 mg by mouth.   Yes ProviderYvette MD   spironolactone (ALDACTONE) 50 MG tablet Take 50 mg by mouth every morning.   Yes Provider, MD Yvette   traMADol (ULTRAM) 50 MG tablet Take  by mouth Every 6 (Six) Hours.   Yes Provider, MD Yvette     Allergies   Allergen Reactions   • Lortab [Hydrocodone-Acetaminophen] Other (See Comments)     Can cause problems for liver has had transplant    • Penicillins Anaphylaxis and Hives   • Tape Other (See Comments)     IV tape causes skin tears   • Codeine Hives     Social History     Socioeconomic History   • Marital status: Single     Spouse name: Not on file   • Number of children: Not on file   • Years of education: Not on file   • Highest education level: Not on file   Tobacco Use   • Smoking status: Never Smoker   • Smokeless tobacco: Never Used   Substance and Sexual Activity   • Alcohol use: No   • Drug use: No   • Sexual activity: Defer       Review of Systems  Review of Systems   Constitutional: Negative for activity change, appetite change, chills, diaphoresis, fatigue, fever and unexpected weight change.   HENT: Negative for sore throat and trouble swallowing.    Respiratory: Negative for shortness of breath.    Gastrointestinal: Negative for abdominal distention, abdominal pain, anal bleeding, blood in stool, constipation, diarrhea, nausea, rectal pain and vomiting.   Musculoskeletal: Negative for arthralgias.   Skin: Negative for pallor.   Neurological: Negative for light-headedness.        Ht 162.6 cm (64\")   Wt 64 kg (141 lb)   LMP 07/18/1971 (Within Months)   BMI 24.20 kg/m²     Objective    Physical Exam   Constitutional: She is oriented to person, place, and time. She appears well-developed and well-nourished. She is cooperative. No distress.   HENT:   Head: Normocephalic and atraumatic.   Neck: Normal range of motion. Neck supple. No thyromegaly present.   Cardiovascular: Normal rate, regular " rhythm and normal heart sounds.   Pulmonary/Chest: Effort normal and breath sounds normal. She has no wheezes. She has no rhonchi. She has no rales.   Abdominal: Soft. Normal appearance and bowel sounds are normal. She exhibits no distension. There is no hepatosplenomegaly. There is no tenderness. There is no rigidity and no guarding. No hernia.   Lymphadenopathy:     She has no cervical adenopathy.   Neurological: She is alert and oriented to person, place, and time.   Skin: Skin is warm, dry and intact. No rash noted. No pallor.   Psychiatric: She has a normal mood and affect. Her speech is normal.     Hospital Outpatient Visit on 07/10/2018   Component Date Value Ref Range Status   • BSA 07/10/2018 1.8  m^2 Final   • BH CV ECHO LAURITA - RVDD 07/10/2018 3.0  cm Final   • IVSd 07/10/2018 1.3  cm Final   • LVIDd 07/10/2018 3.5  cm Final   • LVIDs 07/10/2018 2.5  cm Final   • LVPWd 07/10/2018 1.3  cm Final   • IVS/LVPW 07/10/2018 1.0   Final   • FS 07/10/2018 28.6  % Final   • EDV(Teich) 07/10/2018 50.9  ml Final   • ESV(Teich) 07/10/2018 22.3  ml Final   • EF(Teich) 07/10/2018 56.1  % Final   • EDV(cubed) 07/10/2018 42.9  ml Final   • ESV(cubed) 07/10/2018 15.6  ml Final   • EF(cubed) 07/10/2018 63.6  % Final   • LV mass(C)d 07/10/2018 153.8  grams Final   • LV mass(C)dI 07/10/2018 86.9  grams/m^2 Final   • SV(Teich) 07/10/2018 28.5  ml Final   • SI(Teich) 07/10/2018 16.1  ml/m^2 Final   • SV(cubed) 07/10/2018 27.3  ml Final   • SI(cubed) 07/10/2018 15.4  ml/m^2 Final   • Ao root diam 07/10/2018 2.9  cm Final   • Ao root area 07/10/2018 6.6  cm^2 Final   • ACS 07/10/2018 2.0  cm Final   • LA dimension 07/10/2018 3.4  cm Final   • asc Aorta Diam 07/10/2018 2.9  cm Final   • LA/Ao 07/10/2018 1.2   Final   • LVOT diam 07/10/2018 2.0  cm Final   • LVOT area 07/10/2018 3.1  cm^2 Final   • LVOT area(traced) 07/10/2018 3.1  cm^2 Final   • Ao root area (BSA corrected) 07/10/2018 1.6   Final   • MV E max jostin 07/10/2018 59.4   cm/sec Final   • MV A max jostin 07/10/2018 82.5  cm/sec Final   • MV E/A 07/10/2018 0.72   Final   • MV P1/2t max jostin 07/10/2018 64.7  cm/sec Final   • MV P1/2t 07/10/2018 54.8  msec Final   • MVA(P1/2t) 07/10/2018 4.0  cm^2 Final   • MV dec slope 07/10/2018 346.0  cm/sec^2 Final   • Ao pk jostin 07/10/2018 154.0  cm/sec Final   • Ao max PG 07/10/2018 9.5  mmHg Final   • Ao max PG (full) 07/10/2018 5.2  mmHg Final   • Ao V2 mean 07/10/2018 114.0  cm/sec Final   • Ao mean PG 07/10/2018 6.0  mmHg Final   • Ao mean PG (full) 07/10/2018 4.0  mmHg Final   • Ao V2 VTI 07/10/2018 28.0  cm Final   • [place](I,A) 07/10/2018 2.2  cm^2 Final   • [place](I,D) 07/10/2018 2.2  cm^2 Final   • [place](V,A) 07/10/2018 2.1  cm^2 Final   • [place](V,D) 07/10/2018 2.1  cm^2 Final   • LV V1 max PG 07/10/2018 4.3  mmHg Final   • LV V1 mean PG 07/10/2018 2.0  mmHg Final   • LV V1 max 07/10/2018 104.0  cm/sec Final   • LV V1 mean 07/10/2018 67.9  cm/sec Final   • LV V1 VTI 07/10/2018 19.6  cm Final   • MR max jostin 07/10/2018 523.0  cm/sec Final   • MR max PG 07/10/2018 109.4  mmHg Final   • SV(Ao) 07/10/2018 184.9  ml Final   • SI(Ao) 07/10/2018 104.6  ml/m^2 Final   • SV(LVOT) 07/10/2018 61.6  ml Final   • SI(LVOT) 07/10/2018 34.8  ml/m^2 Final   • PA V2 max 07/10/2018 108.0  cm/sec Final   • PA max PG 07/10/2018 4.7  mmHg Final   • TR max jostin 07/10/2018 258.0  cm/sec Final   • RVSP(TR) 07/10/2018 31.6  mmHg Final   • RAP systole 07/10/2018 5.0  mmHg Final   • MVA P1/2T LCG 07/10/2018 3.4  cm^2 Final   • BH CV ECHO LAURITA - BZI_BMI 07/10/2018 22.9  kilograms/m^2 Final   • BH CV ECHO LAURITA - BSA(HAYCOCK) 07/10/2018 1.8  m^2 Final   • BH CV ECHO LAURITA - BZI_METRIC_WEIGHT 07/10/2018 66.2  kg Final   • BH CV ECHO LAURITA - BZI_METRIC_HEIGHT 07/10/2018 170.2  cm Final   • Target HR (85%) 07/10/2018 125  bpm Final   • Max. Pred. HR (100%) 07/10/2018 147  bpm Final   • Echo EF Estimated 07/10/2018 45  % Final     Assessment/Plan      1. Encounter for colonoscopy due to  history of colonic polyp    .       Orders placed during this encounter include:  Orders Placed This Encounter   Procedures   • Obtain Informed Consent     Standing Status:   Standing     Number of Occurrences:   1     Order Specific Question:   Informed Consent Given For     Answer:   Colonoscopy   • POC Glucose Once     Prior to Procedure on ALL Diabetic Patients     Standing Status:   Standing     Number of Occurrences:   1   • Insert Peripheral IV     Standing Status:   Standing     Number of Occurrences:   1       COLONOSCOPY (N/A)    Review and/or summary of lab tests, radiology, procedures, medications. Review and summary of old records and obtaining of history. The risks and benefits of my recommendations, as well as other treatment options were discussed with the patient today. Questions were answered.    New Medications Ordered This Visit   Medications   • dextrose 5 % and sodium chloride 0.45 % infusion       Follow-up: No Follow-up on file.          This document has been electronically signed by Chaparro Mckeon MD on August 14, 2019 9:06 AM             Results for orders placed or performed during the hospital encounter of 07/10/19   Gold Top - SST   Result Value Ref Range    Extra Tube Hold for add-ons.    Urinalysis, Microscopic Only - Urine, Clean Catch   Result Value Ref Range    RBC, UA 6-12 (A) None Seen /HPF    WBC, UA 31-50 (A) None Seen, 0-2, 3-5 /HPF    Bacteria, UA 1+ (A) None Seen /HPF    Squamous Epithelial Cells, UA 3-5 (A) None Seen, 0-2 /HPF    Hyaline Casts, UA 3-6 None Seen /LPF    Methodology Automated Microscopy    Urinalysis With Microscopic If Indicated (No Culture) - Urine, Clean Catch   Result Value Ref Range    Color, UA Yellow Yellow, Straw, Dark Yellow, Maria Ines    Appearance, UA Cloudy (A) Clear    pH, UA 5.5 5.0 - 9.0    Specific [place], UA 1.015 1.003 - 1.030    Glucose, UA Negative Negative    Ketones, UA Negative Negative    Bilirubin, UA Negative Negative    Blood, UA  Negative Negative    Protein, UA Negative Negative    Leuk Esterase, UA Large (3+) (A) Negative    Nitrite, UA Negative Negative    Urobilinogen, UA 0.2 E.U./dL 0.2 - 1.0 E.U./dL   CBC Auto Differential   Result Value Ref Range    WBC 8.69 3.40 - 10.80 10*3/mm3    RBC 4.66 3.77 - 5.28 10*6/mm3    Hemoglobin 14.3 12.0 - 15.9 g/dL    Hematocrit 43.1 34.0 - 46.6 %    MCV 92.5 79.0 - 97.0 fL    MCH 30.7 26.6 - 33.0 pg    MCHC 33.2 31.5 - 35.7 g/dL    RDW 14.9 12.3 - 15.4 %    RDW-SD 50.7 37.0 - 54.0 fl    MPV 10.1 6.0 - 12.0 fL    Platelets 185 140 - 450 10*3/mm3    Neutrophil % 80.4 (H) 42.7 - 76.0 %    Lymphocyte % 9.1 (L) 19.6 - 45.3 %    Monocyte % 9.1 5.0 - 12.0 %    Eosinophil % 1.0 0.3 - 6.2 %    Basophil % 0.1 0.0 - 1.5 %    Immature Grans % 0.3 0.0 - 0.5 %    Neutrophils, Absolute 6.98 1.70 - 7.00 10*3/mm3    Lymphocytes, Absolute 0.79 0.70 - 3.10 10*3/mm3    Monocytes, Absolute 0.79 0.10 - 0.90 10*3/mm3    Eosinophils, Absolute 0.09 0.00 - 0.40 10*3/mm3    Basophils, Absolute 0.01 0.00 - 0.20 10*3/mm3    Immature Grans, Absolute 0.03 0.00 - 0.05 10*3/mm3    nRBC 0.0 0.0 - 0.2 /100 WBC   Light Blue Top   Result Value Ref Range    Extra Tube hold for add-on    Troponin   Result Value Ref Range    Troponin T <0.010 0.000 - 0.030 ng/mL   Lipase   Result Value Ref Range    Lipase 50 13 - 60 U/L   Comprehensive Metabolic Panel   Result Value Ref Range    Glucose 159 (H) 65 - 99 mg/dL    BUN 42 (H) 8 - 23 mg/dL    Creatinine 1.71 (H) 0.57 - 1.00 mg/dL    Sodium 138 136 - 145 mmol/L    Potassium 4.3 3.5 - 5.2 mmol/L    Chloride 97 (L) 98 - 107 mmol/L    CO2 24.0 22.0 - 29.0 mmol/L    Calcium 9.5 8.6 - 10.5 mg/dL    Total Protein 7.0 6.0 - 8.5 g/dL    Albumin 4.00 3.50 - 5.20 g/dL    ALT (SGPT) 9 1 - 33 U/L    AST (SGOT) 25 1 - 32 U/L    Alkaline Phosphatase 185 (H) 39 - 117 U/L    Total Bilirubin 0.8 0.2 - 1.2 mg/dL    eGFR Non African Amer 29 (L) >60 mL/min/1.73    Globulin 3.0 gm/dL    A/G Ratio 1.3 g/dL     BUN/Creatinine Ratio 24.6 7.0 - 25.0    Anion Gap 17.0 (H) 5.0 - 15.0 mmol/L   Results for orders placed or performed during the hospital encounter of 07/10/18   Adult Transthoracic Echo Complete W/ Cont if Necessary Per Protocol   Result Value Ref Range    BSA 1.8 m^2     CV ECHO LAURITA - RVDD 3.0 cm    IVSd 1.3 cm    LVIDd 3.5 cm    LVIDs 2.5 cm    LVPWd 1.3 cm    IVS/LVPW 1.0     FS 28.6 %    EDV(Teich) 50.9 ml    ESV(Teich) 22.3 ml    EF(Teich) 56.1 %    EDV(cubed) 42.9 ml    ESV(cubed) 15.6 ml    EF(cubed) 63.6 %    LV mass(C)d 153.8 grams    LV mass(C)dI 86.9 grams/m^2    SV(Teich) 28.5 ml    SI(Teich) 16.1 ml/m^2    SV(cubed) 27.3 ml    SI(cubed) 15.4 ml/m^2    Ao root diam 2.9 cm    Ao root area 6.6 cm^2    ACS 2.0 cm    LA dimension 3.4 cm    asc Aorta Diam 2.9 cm    LA/Ao 1.2     LVOT diam 2.0 cm    LVOT area 3.1 cm^2    LVOT area(traced) 3.1 cm^2    Ao root area (BSA corrected) 1.6     MV E max jostin 59.4 cm/sec    MV A max jostin 82.5 cm/sec    MV E/A 0.72     MV P1/2t max jostin 64.7 cm/sec    MV P1/2t 54.8 msec    MVA(P1/2t) 4.0 cm^2    MV dec slope 346.0 cm/sec^2    Ao pk jostin 154.0 cm/sec    Ao max PG 9.5 mmHg    Ao max PG (full) 5.2 mmHg    Ao V2 mean 114.0 cm/sec    Ao mean PG 6.0 mmHg    Ao mean PG (full) 4.0 mmHg    Ao V2 VTI 28.0 cm    GE[place]IA(I,A) 2.2 cm^2    [place]IA(I,D) 2.2 cm^2    [place](V,A) 2.1 cm^2    [place]IA(V,D) 2.1 cm^2    LV V1 max PG 4.3 mmHg    LV V1 mean PG 2.0 mmHg    LV V1 max 104.0 cm/sec    LV V1 mean 67.9 cm/sec    LV V1 VTI 19.6 cm    MR max jostin 523.0 cm/sec    MR max .4 mmHg    SV(Ao) 184.9 ml    SI(Ao) 104.6 ml/m^2    SV(LVOT) 61.6 ml    SI(LVOT) 34.8 ml/m^2    PA V2 max 108.0 cm/sec    PA max PG 4.7 mmHg    TR max jostin 258.0 cm/sec    RVSP(TR) 31.6 mmHg    RAP systole 5.0 mmHg    MVA P1/2T LCG 3.4 cm^2     CV ECHO LAURITA - BZI_BMI 22.9 kilograms/m^2     CV ECHO LAURITA - BSA(HAYCOCK) 1.8 m^2     CV ECHO LAURITA - BZI_METRIC_WEIGHT 66.2 kg     CV ECHO LAURITA - BZI_METRIC_HEIGHT 170.2 cm     Target HR (85%) 125 bpm    Max. Pred. HR (100%) 147 bpm    Echo EF Estimated 45 %     *Note: Due to a large number of results and/or encounters for the requested time period, some results have not been displayed. A complete set of results can be found in Results Review.

## 2019-08-14 NOTE — ANESTHESIA POSTPROCEDURE EVALUATION
Patient: Dora Velazquez    Procedure Summary     Date:  08/14/19 Room / Location:  [facility] ENDOSCOPY 3 / [facility] ENDOSCOPY    Anesthesia Start:  1010 Anesthesia Stop:  1031    Procedure:  COLONOSCOPY (N/A ) Diagnosis:       Encounter for colonoscopy due to history of colonic polyp      (Encounter for colonoscopy due to history of colonic polyp [Z12.11, Z86.010])    Surgeon:  Chaparro Mckeon MD Provider:  Jan Zapata CRNA    Anesthesia Type:  MAC ASA Status:  3          Anesthesia Type: MAC  Last vitals  BP   175/85 (08/14/19 0922)   Temp   97.6 °F (36.4 °C) (08/14/19 0922)   Pulse   74 (08/14/19 0922)   Resp   16 (08/14/19 0922)     SpO2   97 % (08/14/19 0922)     Post Anesthesia Care and Evaluation    Patient location during evaluation: bedside  Patient participation: complete - patient participated  Level of consciousness: awake  Pain score: 0  Pain management: adequate  Airway patency: patent  Anesthetic complications: No anesthetic complications  PONV Status: none  Cardiovascular status: acceptable  Respiratory status: acceptable  Hydration status: acceptable

## 2019-08-15 LAB
LAB AP CASE REPORT: NORMAL
PATH REPORT.FINAL DX SPEC: NORMAL
PATH REPORT.GROSS SPEC: NORMAL

## 2019-08-21 ENCOUNTER — OFFICE VISIT (OUTPATIENT)
Dept: GASTROENTEROLOGY | Facility: CLINIC | Age: 74
End: 2019-08-21

## 2019-08-21 VITALS
HEIGHT: 64 IN | BODY MASS INDEX: 24.38 KG/M2 | DIASTOLIC BLOOD PRESSURE: 58 MMHG | SYSTOLIC BLOOD PRESSURE: 104 MMHG | WEIGHT: 142.8 LBS | HEART RATE: 68 BPM

## 2019-08-21 DIAGNOSIS — K57.30 DIVERTICULOSIS OF LARGE INTESTINE WITHOUT HEMORRHAGE: ICD-10-CM

## 2019-08-21 DIAGNOSIS — D12.2 BENIGN NEOPLASM OF ASCENDING COLON: Primary | ICD-10-CM

## 2019-08-21 PROCEDURE — 99212 OFFICE O/P EST SF 10 MIN: CPT | Performed by: NURSE PRACTITIONER

## 2019-08-21 NOTE — PATIENT INSTRUCTIONS
Colon Polyps  Polyps are tissue growths inside the body. Polyps can grow in many places, including the large intestine (colon). A polyp may be a round bump or a mushroom-shaped growth. You could have one polyp or several.  Most colon polyps are noncancerous (benign). However, some colon polyps can become cancerous over time.  What are the causes?  The exact cause of colon polyps is not known.  What increases the risk?  This condition is more likely to develop in people who:  · Have a family history of colon cancer or colon polyps.  · Are older than 50 or older than 45 if they are .  · Have inflammatory bowel disease, such as ulcerative colitis or Crohn disease.  · Are overweight.  · Smoke cigarettes.  · Do not get enough exercise.  · Drink too much alcohol.  · Eat a diet that is:  ? High in fat and red meat.  ? Low in fiber.  · Had childhood cancer that was treated with abdominal radiation.    What are the signs or symptoms?  Most polyps do not cause symptoms. If you have symptoms, they may include:  · Blood coming from your rectum when having a bowel movement.  · Blood in your stool. The stool may look dark red or black.  · A change in bowel habits, such as constipation or diarrhea.    How is this diagnosed?  This condition is diagnosed with a colonoscopy. This is a procedure that uses a lighted, flexible scope to look at the inside of your colon.  How is this treated?  Treatment for this condition involves removing any polyps that are found. Those polyps will then be tested for cancer. If cancer is found, your health care provider will talk to you about options for colon cancer treatment.  Follow these instructions at home:  Diet  · Eat plenty of fiber, such as fruits, vegetables, and whole grains.  · Eat foods that are high in calcium and vitamin D, such as milk, cheese, yogurt, eggs, liver, fish, and broccoli.  · Limit foods high in fat, red meats, and processed meats, such as hot dogs, sausage,  sotomayor, and lunch meats.  · Maintain a healthy weight, or lose weight if recommended by your health care provider.  General instructions  · Do not smoke cigarettes.  · Do not drink alcohol excessively.  · Keep all follow-up visits as told by your health care provider. This is important. This includes keeping regularly scheduled colonoscopies. Talk to your health care provider about when you need a colonoscopy.  · Exercise every day or as told by your health care provider.  Contact a health care provider if:  · You have new or worsening bleeding during a bowel movement.  · You have new or increased blood in your stool.  · You have a change in bowel habits.  · You unexpectedly lose weight.  This information is not intended to replace advice given to you by your health care provider. Make sure you discuss any questions you have with your health care provider.  Document Released: 09/13/2005 Document Revised: 05/25/2017 Document Reviewed: 11/07/2016  [employer] Interactive Patient Education © 2019 [employer]

## 2019-10-21 DIAGNOSIS — I44.7 LBBB (LEFT BUNDLE BRANCH BLOCK): Primary | ICD-10-CM

## 2019-10-22 ENCOUNTER — OFFICE VISIT (OUTPATIENT)
Dept: CARDIOLOGY | Facility: CLINIC | Age: 74
End: 2019-10-22

## 2019-10-22 VITALS
DIASTOLIC BLOOD PRESSURE: 58 MMHG | SYSTOLIC BLOOD PRESSURE: 106 MMHG | BODY MASS INDEX: 24.75 KG/M2 | OXYGEN SATURATION: 98 % | HEIGHT: 64 IN | WEIGHT: 145 LBS | HEART RATE: 68 BPM

## 2019-10-22 DIAGNOSIS — I44.7 LEFT BUNDLE BRANCH BLOCK: ICD-10-CM

## 2019-10-22 DIAGNOSIS — I48.0 PAROXYSMAL ATRIAL FIBRILLATION (HCC): ICD-10-CM

## 2019-10-22 DIAGNOSIS — G45.9 TRANSIENT CEREBRAL ISCHEMIA, UNSPECIFIED TYPE: ICD-10-CM

## 2019-10-22 DIAGNOSIS — I10 ESSENTIAL HYPERTENSION: Primary | ICD-10-CM

## 2019-10-22 DIAGNOSIS — I50.32 CHRONIC HEART FAILURE WITH PRESERVED EJECTION FRACTION (HCC): ICD-10-CM

## 2019-10-22 DIAGNOSIS — E78.5 HYPERLIPIDEMIA, UNSPECIFIED HYPERLIPIDEMIA TYPE: ICD-10-CM

## 2019-10-22 PROCEDURE — 99214 OFFICE O/P EST MOD 30 MIN: CPT | Performed by: INTERNAL MEDICINE

## 2019-10-22 NOTE — PROGRESS NOTES
Dora Velazquez  74 y.o. female    10/22/2019  1. Essential hypertension    2. Paroxysmal atrial fibrillation (CMS/HCC)    3. Chronic heart failure with preserved ejection fraction (CMS/HCC)    4. Left bundle branch block    5. Hyperlipidemia, unspecified hyperlipidemia type    6. Transient cerebral ischemia, unspecified type        History of Present Illness:    Patient's Body mass index is 24.89 kg/m². BMI is within normal parameters. No follow-up required.   .  74 years old patient presented for routine follow-up physically very active appropriate mental status evaluated by Dr. Baltazar in the past with history of hypertension, hypertensive heart disease, diastolic dysfunction, Parkinson disease, liver transplant did remarkably well and history of paroxysmal atrial fibrillation was on oral anticoagulation discontinued due to significant GI bleed more than 3-4 years ago.  She also had history of paroxysmal atrial fibrillation no further recurrence.  Patient denies orthopnea, PND, nauseous, vomiting.  Denied polydipsia polyuria.  Denies any fever cough which was dysuria or hematuria.   last echocardiogram study reported ejection fraction 30-35% 2014.Repeat  echo has ejection fraction improved to 45%  We'll arrange an echo cardiac study to reassess the left and a systolic function.     ECHO 7/11/2018  ·  Left atrial cavity size is mildly dilated.  · The left ventricular cavity is mildly dilated.  · Left ventricular systolic function is low normal. Estimated EF = 45%.  · Left ventricular diastolic dysfunction (grade I a) consistent with impaired relaxation.  Left ventricular wall thickness is consistent with mild concentric hypertrophy      ECHO 2014  1. Depressed left ventricular systolic function with ejection fraction        of 30% to 35%.  2. Mild concentric left ventricular hypertrophy  3. Grade 1 diastolic dysfunction of the left ventricular myocardium      SUBJECTIVE:    Allergies   Allergen Reactions   •  Lortab [Hydrocodone-Acetaminophen] Other (See Comments)     Can cause problems for liver has had transplant    • Penicillins Anaphylaxis and Hives   • Tape Other (See Comments)     IV tape causes skin tears   • Codeine Hives         Past Medical History:   Diagnosis Date   • Arthritis    • Backache    • Bilateral pleural effusion    • Blood in feces     Blood in feces symptom   • Capsulitis    • Cardiomyopathy (CMS/HCC)      HFpEF, improved      • Chest pain    • CHF (congestive heart failure) (CMS/HCC)    • Chronic anemia    • Chronic hepatitis C (CMS/HCC)    • Degenerative joint disease involving multiple joints    • Dilated cardiomyopathy (CMS/HCC)    • Dyslipidemia    • Dyspnea    • Edema of leg    • Epistaxis    • Essential hypertension    • Fibromyalgia, primary    • GERD (gastroesophageal reflux disease)    • H/O endoscopy 06/30/2014    Colon endoscopy [id number]   • Headache    • Hemorrhoids     internal & external   • History of liver recipient (CMS/HCC)     S/P done at [facility] 2003      • Hyperlipemia    • Hyperlipidemia    • Hypertension    • Hypokalemia    • Left bundle branch block    • Metatarsalgia     Metatarsalgia - with fat pad atrophie      • Pap smear for cervical cancer screening 02/09/1996   • Paroxysmal atrial fibrillation (CMS/HCC)    • Polyp of sigmoid colon    • Salmonella arthritis (CMS/HCC)    • Transient cerebral ischemia     Unspecified         Past Surgical History:   Procedure Laterality Date   • BACK SURGERY  1995    Back Surgery (2)      • CARDIAC CATHETERIZATION  11/03/2005    Orders Not Yet Performed: 0       • CARPAL TUNNEL RELEASE  02/12/2007    Carpal tunnel surgery (1)     • CAUTERIZATION NASAL BLEEDERS  03/09/2000    Control nose/throat bleeding (1)      • CHOLECYSTECTOMY  2000   • COLONOSCOPY N/A 8/14/2019    Procedure: COLONOSCOPY;  Surgeon: Chaparro Mckeon MD;  Location: [facility] ENDOSCOPY;  Service: Gastroenterology   • LAPAROSCOPIC LYSIS OF ADHESIONS  07/16/1992     Laparoscopy; lysis of adhesions (1)      • LIVER BIOPSY  01/05/1998    Needle biopsy of liver [id number] (1)      • LIVER TRANSPLANTATION  2003    Anesth, for liver transplant (1)      • SALPINGO OOPHORECTOMY  1974    Salpingo-oophorectomy (1)      • SHOULDER SURGERY  08/29/2007    Shoulder surgery procedure (1)      • TOTAL ABDOMINAL HYSTERECTOMY  1968    Total abd hysterectomy (1)            Family History   Problem Relation Age of Onset   • Heart attack Father    • Stroke Father    • Cancer Other         Other   • Heart disease Other    • Hypertension Other    • Cholelithiasis Other          Social History     Socioeconomic History   • Marital status: Single     Spouse name: Not on file   • Number of children: Not on file   • Years of education: Not on file   • Highest education level: Not on file   Tobacco Use   • Smoking status: Never Smoker   • Smokeless tobacco: Never Used   Substance and Sexual Activity   • Alcohol use: No   • Drug use: No   • Sexual activity: Defer         Current Outpatient Medications   Medication Sig Dispense Refill   • allopurinol (ZYLOPRIM) 100 MG tablet Take 100 mg by mouth 3 (Three) Times a Day.     • aspirin 81 MG tablet Take 81 mg by mouth daily.     • atorvastatin (LIPITOR) 40 MG tablet Take 40 mg by mouth every night.     • carbidopa-levodopa (SINEMET)  MG per tablet take 1 tab three times daily     • carvedilol (COREG) 25 MG tablet Take 25 mg by mouth 2 (two) times a day.     • furosemide (LASIX) 40 MG tablet Take 40 mg by mouth daily.     • isosorbide mononitrate (IMDUR) 120 MG 24 hr tablet Take 120 mg by mouth daily.     • magnesium gluconate (MAGONATE) 500 MG tablet Take  by mouth.     • sirolimus (RAPAMUNE) 0.5 MG tablet tablet Take 1 mg by mouth Daily.     • spironolactone (ALDACTONE) 50 MG tablet Take 50 mg by mouth every morning.     • ondansetron ODT (ZOFRAN-ODT) 4 MG disintegrating tablet Take 1 tablet by mouth Every 6 (Six) Hours As Needed for Nausea or Vomiting. 10  "tablet 0   • traMADol (ULTRAM) 50 MG tablet Take  by mouth Every 6 (Six) Hours.       No current facility-administered medications for this visit.            Review of Systems:     Constitutional:  Denies recent weight loss, weight gain, fever or chills, no change in exercise tolerance.     HENT:  Denies any hearing loss, epistaxis, hoarseness, or difficulty speaking.     Eyes: Wears eyeglasses or contact lenses.    Respiratory:  Denies dyspnea with exertion,no cough, wheezing, or hemoptysis.     Cardiovascular: Negative for palpations, chest pain, orthopnea, PND, peripheral edema, syncope, or claudication.     Gastrointestinal: History of GI bleed and liver transplant    Endocrine: Negative for cold intolerance, heat intolerance, polydipsia, polyphagia and polyuria. Denies any history of weight change, polydipsia, polyuria.     Genitourinary: Negative.      Musculoskeletal: Denies any history of arthritic symptoms or back problems.     Skin:  Denies any change in hair or nails, rashes, or skin lesions.     Allergic/Immunologic: Negative.  Negative for environmental allergies, food allergies and immunocompromised state.     Neurological:  Denies any history of recurrent headaches, strokes, TIA, or seizure disorder.     Hematological: Denies any food allergies, seasonal allergies, bleeding disorders, or lymphadenopathy.     Psychiatric/Behavioral: Denies any history of depression, substance abuse, or change in cognitive function.       OBJECTIVE:    /58   Pulse 68   Ht 162.6 cm (64\")   Wt 65.8 kg (145 lb)   LMP 07/18/1971 (Within Months)   SpO2 98%   BMI 24.89 kg/m²       Physical Exam:     Constitutional: Cooperative, alert and oriented, well-developed, well-nourished, in no acute distress.     HENT:   Head: Normocephalic, normal hair patterns, no masses or tenderness.  Ears, Nose, and Throat: No gross abnormalities. No pallor or cyanosis. Dentition good.   Eyes: EOMS intact, PERRL, conjunctivae and lids " unremarkable. Fundoscopic exam and visual fields not performed.   Neck: No palpable masses or adenopathy, no thyromegaly, no JVD, carotid pulses are full and equal bilaterally and without  Bruits.     Cardiovascular: Regular rhythm, S1 and S2 normal, no S3 or S4. Apical impulse not displaced. No murmurs, gallops, or rubs detected.     Pulmonary/Chest: Chest: normal symmetry, no tenderness to palpation, normal respiratory excursion, no intercostal retraction, no use of accessory muscles. Pulmonary: Normal breath sounds. No rales or rhonchi.    Abdominal: Abdomen soft, bowel sounds normoactive, no masses, no hepatosplenomegaly, non-tender, no bruits.     Musculoskeletal: No deformities, clubbing, cyanosis, erythema, or edema observed. There are no spinal abnormalities noted. Normal muscle strength and tone. Pulses full and equal in all extremities, no bruits auscultated.     Neurological: No gross motor or sensory deficits noted, affect appropriate, oriented to time, person, place.     Skin: Warm and dry to the touch, no apparent skin lesions or masses noted.     Psychiatric: She has a normal mood and affect. Her behavior is normal. Judgment and thought content normal.         Procedures      Lab Results   Component Value Date    WBC 8.69 07/10/2019    HGB 14.3 07/10/2019    HCT 43.1 07/10/2019    MCV 92.5 07/10/2019     07/10/2019     Lab Results   Component Value Date    GLUCOSE 159 (H) 07/10/2019    BUN 42 (H) 07/10/2019    CREATININE 1.71 (H) 07/10/2019    EGFRIFNONA 29 (L) 07/10/2019    BCR 24.6 07/10/2019    CO2 24.0 07/10/2019    CALCIUM 9.5 07/10/2019    ALBUMIN 4.00 07/10/2019    AST 25 07/10/2019    ALT 9 07/10/2019     No results found for: CHOL  No results found for: TRIG  No results found for: HDL  No components found for: LDLCALC  No results found for: LDL  No results found for: HDLLDLRATIO  No components found for: CHOLHDL  No results found for: HGBA1C  No results found for: TSH, T3WSQXW,  X5XMELR, THYROIDAB        ASSESSMENT AND PLAN:  Congestive heart failure compensated due to systolic and diastolic dysfunction  #2 hypertension hypertensive heart disease   #3 paroxysmal atrial fibrillation no further recurrence   #4 Parkinson disease   #5 status post liver transplant     Clinically, no sign of any cardiac decompensation based on the clinical history physical finding.  No e vidence of ongoing ischemia at the time of evaluation.  She is a pleased with the clinical outcome.  She had history of for GI bleed requiring discontinuation of oral anticoagulation more than 3-4 years ago refused reinitiation and not interested in watchman.  At present recommend to continue atorvastatin for management of hyperlipidemia, carbidopa for Parkinson disease, carvedilol and Lasix / aldactone with history of congestive heart failure on the basis of systolic and diastolic dysfunction compensated. EKG discussed with the patient  We will get hepatic lipid profile as a part of lipid monitoring    Dora was seen today for follow-up.    Diagnoses and all orders for this visit:    Essential hypertension    Paroxysmal atrial fibrillation (CMS/HCC)    Chronic heart failure with preserved ejection fraction (CMS/HCC)    Left bundle branch block    Hyperlipidemia, unspecified hyperlipidemia type    Transient cerebral ischemia, unspecified type          Lo Jade MD  10/22/2019  2:55 PM

## 2019-11-27 ENCOUNTER — OFFICE VISIT (OUTPATIENT)
Dept: NEUROLOGY | Age: 74
End: 2019-11-27
Payer: MEDICARE

## 2019-11-27 VITALS
SYSTOLIC BLOOD PRESSURE: 138 MMHG | HEART RATE: 67 BPM | DIASTOLIC BLOOD PRESSURE: 76 MMHG | HEIGHT: 67 IN | BODY MASS INDEX: 22.44 KG/M2 | WEIGHT: 143 LBS

## 2019-11-27 DIAGNOSIS — Z86.39 HISTORY OF HYPERLIPIDEMIA: ICD-10-CM

## 2019-11-27 DIAGNOSIS — Z86.79 HISTORY OF HYPERTENSION: ICD-10-CM

## 2019-11-27 DIAGNOSIS — G20 PARKINSON DISEASE (HCC): Primary | ICD-10-CM

## 2019-11-27 PROCEDURE — G8420 CALC BMI NORM PARAMETERS: HCPCS | Performed by: PSYCHIATRY & NEUROLOGY

## 2019-11-27 PROCEDURE — 99214 OFFICE O/P EST MOD 30 MIN: CPT | Performed by: PSYCHIATRY & NEUROLOGY

## 2019-11-27 PROCEDURE — 1123F ACP DISCUSS/DSCN MKR DOCD: CPT | Performed by: PSYCHIATRY & NEUROLOGY

## 2019-11-27 PROCEDURE — 3017F COLORECTAL CA SCREEN DOC REV: CPT | Performed by: PSYCHIATRY & NEUROLOGY

## 2019-11-27 PROCEDURE — 1036F TOBACCO NON-USER: CPT | Performed by: PSYCHIATRY & NEUROLOGY

## 2019-11-27 PROCEDURE — 1090F PRES/ABSN URINE INCON ASSESS: CPT | Performed by: PSYCHIATRY & NEUROLOGY

## 2019-11-27 PROCEDURE — G8400 PT W/DXA NO RESULTS DOC: HCPCS | Performed by: PSYCHIATRY & NEUROLOGY

## 2019-11-27 PROCEDURE — G8427 DOCREV CUR MEDS BY ELIG CLIN: HCPCS | Performed by: PSYCHIATRY & NEUROLOGY

## 2019-11-27 PROCEDURE — 4040F PNEUMOC VAC/ADMIN/RCVD: CPT | Performed by: PSYCHIATRY & NEUROLOGY

## 2019-11-27 PROCEDURE — G8484 FLU IMMUNIZE NO ADMIN: HCPCS | Performed by: PSYCHIATRY & NEUROLOGY

## 2019-11-27 RX ORDER — ALLOPURINOL 100 MG/1
1 TABLET ORAL 3 TIMES DAILY
Refills: 5 | COMMUNITY
Start: 2019-08-26 | End: 2022-10-10

## 2019-12-05 ENCOUNTER — DOCUMENTATION (OUTPATIENT)
Dept: CARDIOLOGY | Facility: CLINIC | Age: 74
End: 2019-12-05

## 2019-12-09 ENCOUNTER — DOCUMENTATION (OUTPATIENT)
Dept: CARDIOLOGY | Facility: CLINIC | Age: 74
End: 2019-12-09

## 2019-12-09 DIAGNOSIS — I48.0 PAROXYSMAL ATRIAL FIBRILLATION (HCC): Primary | ICD-10-CM

## 2019-12-09 DIAGNOSIS — I10 ESSENTIAL HYPERTENSION: ICD-10-CM

## 2019-12-10 DIAGNOSIS — I10 ESSENTIAL HYPERTENSION: ICD-10-CM

## 2019-12-10 DIAGNOSIS — I48.0 PAROXYSMAL ATRIAL FIBRILLATION (HCC): ICD-10-CM

## 2020-05-05 ENCOUNTER — OFFICE VISIT (OUTPATIENT)
Dept: CARDIOLOGY | Facility: CLINIC | Age: 75
End: 2020-05-05

## 2020-05-05 DIAGNOSIS — E78.5 HYPERLIPIDEMIA, UNSPECIFIED HYPERLIPIDEMIA TYPE: ICD-10-CM

## 2020-05-05 DIAGNOSIS — I48.0 PAROXYSMAL ATRIAL FIBRILLATION (HCC): Primary | ICD-10-CM

## 2020-05-05 DIAGNOSIS — I44.7 LEFT BUNDLE BRANCH BLOCK: ICD-10-CM

## 2020-05-05 DIAGNOSIS — I10 ESSENTIAL HYPERTENSION: ICD-10-CM

## 2020-05-05 DIAGNOSIS — I50.32 CHRONIC HEART FAILURE WITH PRESERVED EJECTION FRACTION (HCC): ICD-10-CM

## 2020-05-05 PROCEDURE — 99442 PR PHYS/QHP TELEPHONE EVALUATION 11-20 MIN: CPT | Performed by: INTERNAL MEDICINE

## 2020-05-05 NOTE — PROGRESS NOTES
Dora Velazquez  74 y.o. female    5/5/2020     1. Paroxysmal atrial fibrillation (CMS/HCC)    2. Chronic heart failure with preserved ejection fraction (CMS/HCC)    3. Essential hypertension    4. Left bundle branch block    5. Hyperlipidemia, unspecified hyperlipidemia type        History of Present Illness:    This visit has been rescheduled as a phone visit to comply with patient safety concerns in accordance with CDC recommendations. Total time of discussion was 20 minutes.    You have chosen to receive care through a telephone visit. Do you consent to use a telephone visit for your medical care today? Yes    .  74 years old patient presented for telephone office visit and clinically over the phone she seemed to be doing remarkably well she followed with liver transplant plans center regarding her blood work and medication adjustment physically very active appropriate mental status evaluated by Dr. Baltazar in the past with history of hypertension, hypertensive heart disease, diastolic dysfunction, Parkinson disease, liver transplant did remarkably well and history of paroxysmal atrial fibrillation was on oral anticoagulation discontinued due to significant GI bleed more than 3-4 years ago.  She also had history of paroxysmal atrial fibrillation no further recurrence.  Patient denies orthopnea, PND, nauseous, vomiting.  Denied polydipsia polyuria.  Denies any fever cough which was dysuria or hematuria.   last echocardiogram study reported ejection fraction 30-35% 2014.Repeat  echo has ejection fraction improved to 45%  We'll arrange an echo cardiac study to reassess the left and a systolic function.     ECHO 7/11/2018  ·  Left atrial cavity size is mildly dilated.  · The left ventricular cavity is mildly dilated.  · Left ventricular systolic function is low normal. Estimated EF = 45%.  · Left ventricular diastolic dysfunction (grade I a) consistent with impaired relaxation.  Left ventricular wall thickness is  consistent with mild concentric hypertrophy      ECHO 2014  1. Depressed left ventricular systolic function with ejection fraction        of 30% to 35%.  2. Mild concentric left ventricular hypertrophy  3. Grade 1 diastolic dysfunction of the left ventricular myocardium      SUBJECTIVE:    Allergies   Allergen Reactions   • Lortab [Hydrocodone-Acetaminophen] Other (See Comments)     Can cause problems for liver has had transplant    • Penicillins Anaphylaxis and Hives   • Tape Other (See Comments)     IV tape causes skin tears   • Codeine Hives         Past Medical History:   Diagnosis Date   • Arthritis    • Backache    • Bilateral pleural effusion    • Blood in feces     Blood in feces symptom   • Capsulitis    • Cardiomyopathy (CMS/HCC)      HFpEF, improved      • Chest pain    • CHF (congestive heart failure) (CMS/HCC)    • Chronic anemia    • Chronic hepatitis C (CMS/HCC)    • Degenerative joint disease involving multiple joints    • Dilated cardiomyopathy (CMS/HCC)    • Dyslipidemia    • Dyspnea    • Edema of leg    • Epistaxis    • Essential hypertension    • Fibromyalgia, primary    • GERD (gastroesophageal reflux disease)    • H/O endoscopy 06/30/2014    Colon endoscopy [id number]   • Headache    • Hemorrhoids     internal & external   • History of liver recipient (CMS/HCC)     S/P done at [facility] 2003      • Hyperlipemia    • Hyperlipidemia    • Hypertension    • Hypokalemia    • Left bundle branch block    • Metatarsalgia     Metatarsalgia - with fat pad atrophie      • Pap smear for cervical cancer screening 02/09/1996   • Paroxysmal atrial fibrillation (CMS/HCC)    • Polyp of sigmoid colon    • Salmonella arthritis (CMS/HCC)    • Transient cerebral ischemia     Unspecified         Past Surgical History:   Procedure Laterality Date   • BACK SURGERY  1995    Back Surgery (2)      • CARDIAC CATHETERIZATION  11/03/2005    Orders Not Yet Performed: 0       • CARPAL TUNNEL RELEASE  02/12/2007    Carpal  tunnel surgery (1)     • CAUTERIZATION NASAL BLEEDERS  03/09/2000    Control nose/throat bleeding (1)      • CHOLECYSTECTOMY  2000   • COLONOSCOPY N/A 8/14/2019    Procedure: COLONOSCOPY;  Surgeon: Chaparro Mckeon MD;  Location: [facility] ENDOSCOPY;  Service: Gastroenterology   • LAPAROSCOPIC LYSIS OF ADHESIONS  07/16/1992    Laparoscopy; lysis of adhesions (1)      • LIVER BIOPSY  01/05/1998    Needle biopsy of liver [id number] (1)      • LIVER TRANSPLANTATION  2003    Anesth, for liver transplant (1)      • SALPINGO OOPHORECTOMY  1974    Salpingo-oophorectomy (1)      • SHOULDER SURGERY  08/29/2007    Shoulder surgery procedure (1)      • TOTAL ABDOMINAL HYSTERECTOMY  1968    Total abd hysterectomy (1)            Family History   Problem Relation Age of Onset   • Heart attack Father    • Stroke Father    • Cancer Other         Other   • Heart disease Other    • Hypertension Other    • Cholelithiasis Other          Social History     Socioeconomic History   • Marital status: Single     Spouse name: Not on file   • Number of children: Not on file   • Years of education: Not on file   • Highest education level: Not on file   Tobacco Use   • Smoking status: Never Smoker   • Smokeless tobacco: Never Used   Substance and Sexual Activity   • Alcohol use: No   • Drug use: No   • Sexual activity: Defer         Current Outpatient Medications   Medication Sig Dispense Refill   • allopurinol (ZYLOPRIM) 100 MG tablet Take 100 mg by mouth 3 (Three) Times a Day.     • aspirin 81 MG tablet Take 81 mg by mouth daily.     • atorvastatin (LIPITOR) 40 MG tablet Take 40 mg by mouth every night.     • carbidopa-levodopa (SINEMET)  MG per tablet take 1 tab three times daily     • carvedilol (COREG) 25 MG tablet Take 25 mg by mouth 2 (two) times a day.     • furosemide (LASIX) 40 MG tablet Take 40 mg by mouth daily.     • isosorbide mononitrate (IMDUR) 120 MG 24 hr tablet Take 120 mg by mouth daily.     • magnesium gluconate (MAGONATE)  500 MG tablet Take  by mouth.     • ondansetron ODT (ZOFRAN-ODT) 4 MG disintegrating tablet Take 1 tablet by mouth Every 6 (Six) Hours As Needed for Nausea or Vomiting. 10 tablet 0   • sirolimus (RAPAMUNE) 0.5 MG tablet tablet Take 1 mg by mouth Daily.     • spironolactone (ALDACTONE) 50 MG tablet Take 50 mg by mouth every morning.     • traMADol (ULTRAM) 50 MG tablet Take  by mouth Every 6 (Six) Hours.       No current facility-administered medications for this visit.            Review of Systems:     Constitutional:  Denies recent weight loss, weight gain, fever or chills, no change in exercise tolerance.     HENT:  Denies any hearing loss, epistaxis, hoarseness, or difficulty speaking.     Eyes: No blurring    Respiratory:  Denies dyspnea with exertion,no cough, wheezing, or hemoptysis.     Cardiovascular: See H&P    Gastrointestinal: History of GI bleed and liver transplant    Endocrine: Negative for cold intolerance, heat intolerance, polydipsia, polyphagia and polyuria. Denies any history of weight change, polydipsia, polyuria.     Genitourinary: Negative.      Musculoskeletal osteoarthritis    Skin:  Denies any change in hair or nails, rashes, or skin lesions.     Allergic/Immunologic: Negative.  Negative for environmental allergies, food allergies and immunocompromised state.     Neurological:  Denies any history of recurrent headaches, strokes, TIA, or seizure disorder.     Hematological: Denies any food allergies, seasonal allergies, bleeding disorders, or lymphadenopathy.     Psychiatric/Behavioral: Denies any history of depression, substance abuse, or change in cognitive function.       OBJECTIVE:    LMP 07/18/1971 (Within Months)       Physical Exam:     No physical exam due to telephone office visit        Procedures      Lab Results   Component Value Date    WBC 8.69 07/10/2019    HGB 14.3 07/10/2019    HCT 43.1 07/10/2019    MCV 92.5 07/10/2019     07/10/2019     Lab Results   Component Value  Date    GLUCOSE 159 (H) 07/10/2019    BUN 42 (H) 07/10/2019    CREATININE 1.71 (H) 07/10/2019    EGFRIFNONA 29 (L) 07/10/2019    BCR 24.6 07/10/2019    CO2 24.0 07/10/2019    CALCIUM 9.5 07/10/2019    ALBUMIN 4.00 07/10/2019    AST 25 07/10/2019    ALT 9 07/10/2019     No results found for: CHOL  No results found for: TRIG  No results found for: HDL  No components found for: LDLCALC  No results found for: LDL  No results found for: HDLLDLRATIO  No components found for: CHOLHDL  No results found for: HGBA1C  No results found for: TSH, [id number], [id number], THYROIDAB        ASSESSMENT AND PLAN:  Congestive heart failure compensated due to systolic and diastolic dysfunction  #2 hypertension hypertensive heart disease   #3 paroxysmal atrial fibrillation no further recurrence   #4 Parkinson disease   #5 status post liver transplant     Presented for telephone office visit and she is doing remarkably well based on clinical information over the telephone seem to be energetic good mental status and physically active and followed with the transplant center regarding her liver transplant and blood work and medication adjustment she is a pleased with the clinical outcome.  She had history of for GI bleed requiring discontinuation of oral anticoagulation more than 3-4 years ago refused reinitiation and not interested in watchman.  At present recommend to continue atorvastatin for management of hyperlipidemia, carbidopa for Parkinson disease, carvedilol and Lasix / aldactone with history of congestive heart failure on the basis of systolic and diastolic dysfunction compensated    Addendum  Patient is scheduled to undergo eye related procedure (lid surgery) appears to be low risk from cardiac point of view for the proposed procedure.  No further risk stratification needed  Diagnoses and all orders for this visit:    Paroxysmal atrial fibrillation (CMS/HCC)    Chronic heart failure with preserved ejection fraction  (CMS/[facility], located within [facility])    Essential hypertension    Left bundle branch block    Hyperlipidemia, unspecified hyperlipidemia type          Lo Jade MD  5/5/2020  13:43

## 2020-10-14 ENCOUNTER — TELEPHONE (OUTPATIENT)
Dept: NEUROLOGY | Age: 75
End: 2020-10-14

## 2020-10-14 NOTE — TELEPHONE ENCOUNTER
Called patient about an appointment change with Dr Jason Rios. She is aware of the changed appointment.

## 2020-11-10 ENCOUNTER — OFFICE VISIT (OUTPATIENT)
Dept: CARDIOLOGY | Facility: CLINIC | Age: 75
End: 2020-11-10

## 2020-11-10 VITALS
WEIGHT: 145 LBS | HEART RATE: 71 BPM | OXYGEN SATURATION: 98 % | HEIGHT: 64 IN | BODY MASS INDEX: 24.75 KG/M2 | SYSTOLIC BLOOD PRESSURE: 138 MMHG | DIASTOLIC BLOOD PRESSURE: 88 MMHG

## 2020-11-10 DIAGNOSIS — I44.7 LEFT BUNDLE BRANCH BLOCK: ICD-10-CM

## 2020-11-10 DIAGNOSIS — E78.5 HYPERLIPIDEMIA, UNSPECIFIED HYPERLIPIDEMIA TYPE: ICD-10-CM

## 2020-11-10 DIAGNOSIS — I50.32 CHRONIC HEART FAILURE WITH PRESERVED EJECTION FRACTION (HCC): ICD-10-CM

## 2020-11-10 DIAGNOSIS — I10 ESSENTIAL HYPERTENSION: ICD-10-CM

## 2020-11-10 DIAGNOSIS — I48.0 PAROXYSMAL ATRIAL FIBRILLATION (HCC): Primary | ICD-10-CM

## 2020-11-10 LAB
QT INTERVAL: 416 MS
QTC INTERVAL: 452 MS

## 2020-11-10 PROCEDURE — 99213 OFFICE O/P EST LOW 20 MIN: CPT | Performed by: INTERNAL MEDICINE

## 2020-11-10 PROCEDURE — 93000 ELECTROCARDIOGRAM COMPLETE: CPT | Performed by: INTERNAL MEDICINE

## 2020-11-10 RX ORDER — ERYTHROMYCIN 5 MG/G
OINTMENT OPHTHALMIC
COMMUNITY
Start: 2020-10-29 | End: 2021-06-22

## 2020-11-10 NOTE — PROGRESS NOTES
Dora Velazquez  75 y.o. female    10/22/2019  1. Paroxysmal atrial fibrillation (CMS/HCC)    2. Chronic heart failure with preserved ejection fraction (CMS/HCC)    3. Essential hypertension    4. Hyperlipidemia, unspecified hyperlipidemia type    5. Left bundle branch block        History of Present Illness:    Patient's Body mass index is 24.88 kg/m². BMI is within normal parameters. No follow-up required.   .  75 years old patient presented for routine follow-up and no symptom suggestive of cardiac decompensation reported by the patient physically very active appropriate mental status evaluated by Dr. Baltazar in the past with history of hypertension, hypertensive heart disease, diastolic dysfunction, Parkinson disease, liver transplant did remarkably well and history of paroxysmal atrial fibrillation was on oral anticoagulation discontinued due to significant GI bleed more than 3-4 years ago.  She also had history of paroxysmal atrial fibrillation no further recurrence.  Patient denies orthopnea, PND, nauseous, vomiting.  Denied polydipsia polyuria.  Denies any fever cough which was dysuria or hematuria.   last echocardiogram study reported ejection fraction 30-35% 2014.Repeat  echo has ejection fraction improved to 45%  We'll arrange an echo cardiac study to reassess the left and a systolic function.     ECHO 7/11/2018  ·  Left atrial cavity size is mildly dilated.  · The left ventricular cavity is mildly dilated.  · Left ventricular systolic function is low normal. Estimated EF = 45%.  · Left ventricular diastolic dysfunction (grade I a) consistent with impaired relaxation.  Left ventricular wall thickness is consistent with mild concentric hypertrophy      ECHO 2014  1. Depressed left ventricular systolic function with ejection fraction        of 30% to 35%.  2. Mild concentric left ventricular hypertrophy  3. Grade 1 diastolic dysfunction of the left ventricular myocardium      SUBJECTIVE:    Allergies    Allergen Reactions   • Lortab [Hydrocodone-Acetaminophen] Other (See Comments)     Can cause problems for liver has had transplant    • Penicillins Anaphylaxis and Hives   • Tape Other (See Comments)     IV tape causes skin tears   • Codeine Hives         Past Medical History:   Diagnosis Date   • Arthritis    • Backache    • Bilateral pleural effusion    • Blood in feces     Blood in feces symptom   • Capsulitis    • Cardiomyopathy (CMS/HCC)      HFpEF, improved      • Chest pain    • CHF (congestive heart failure) (CMS/HCC)    • Chronic anemia    • Chronic hepatitis C (CMS/HCC)    • Degenerative joint disease involving multiple joints    • Dilated cardiomyopathy (CMS/HCC)    • Dyslipidemia    • Dyspnea    • Edema of leg    • Epistaxis    • Essential hypertension    • Fibromyalgia, primary    • GERD (gastroesophageal reflux disease)    • H/O endoscopy 06/30/2014    Colon endoscopy [id number]   • Headache    • Hemorrhoids     internal & external   • History of liver recipient (CMS/HCC)     S[facility] 2003      • Hyperlipemia    • Hyperlipidemia    • Hypertension    • Hypokalemia    • Left bundle branch block    • Metatarsalgia     Metatarsalgia - with fat pad atrophie      • Pap smear for cervical cancer screening 02/09/1996   • Paroxysmal atrial fibrillation (CMS/HCC)    • Polyp of sigmoid colon    • Salmonella arthritis (CMS/HCC)    • Transient cerebral ischemia     Unspecified         Past Surgical History:   Procedure Laterality Date   • BACK SURGERY  1995    Back Surgery (2)      • CARDIAC CATHETERIZATION  11/03/2005    Orders Not Yet Performed: 0       • CARPAL TUNNEL RELEASE  02/12/2007    Carpal tunnel surgery (1)     • CAUTERIZATION NASAL BLEEDERS  03/09/2000    Control nose/throat bleeding (1)      • CHOLECYSTECTOMY  2000   • COLONOSCOPY N/A 8/14/2019    Procedure: COLONOSCOPY;  Surgeon: Chaparro Mckeon MD;  Location: [facility] ENDOSCOPY;  Service: Gastroenterology   • LAPAROSCOPIC LYSIS OF  ADHESIONS  07/16/1992    Laparoscopy; lysis of adhesions (1)      • LIVER BIOPSY  01/05/1998    Needle biopsy of liver [id number] (1)      • LIVER TRANSPLANTATION  2003    Anesth, for liver transplant (1)      • SALPINGO OOPHORECTOMY  1974    Salpingo-oophorectomy (1)      • SHOULDER SURGERY  08/29/2007    Shoulder surgery procedure (1)      • TOTAL ABDOMINAL HYSTERECTOMY  1968    Total abd hysterectomy (1)            Family History   Problem Relation Age of Onset   • Heart attack Father    • Stroke Father    • Cancer Other         Other   • Heart disease Other    • Hypertension Other    • Cholelithiasis Other          Social History     Socioeconomic History   • Marital status: Single     Spouse name: Not on file   • Number of children: Not on file   • Years of education: Not on file   • Highest education level: Not on file   Tobacco Use   • Smoking status: Never Smoker   • Smokeless tobacco: Never Used   Substance and Sexual Activity   • Alcohol use: No   • Drug use: No   • Sexual activity: Defer         Current Outpatient Medications   Medication Sig Dispense Refill   • allopurinol (ZYLOPRIM) 100 MG tablet Take 100 mg by mouth 3 (Three) Times a Day.     • aspirin 81 MG tablet Take 81 mg by mouth daily.     • atorvastatin (LIPITOR) 40 MG tablet Take 40 mg by mouth every night.     • carbidopa-levodopa (SINEMET)  MG per tablet take 1 tab three times daily     • carvedilol (COREG) 25 MG tablet Take 25 mg by mouth 2 (two) times a day.     • erythromycin (ROMYCIN) 5 MG/GM ophthalmic ointment APPLY TO EACH EYELID FOUR TIMES DAILY     • furosemide (LASIX) 40 MG tablet Take 40 mg by mouth daily.     • isosorbide mononitrate (IMDUR) 120 MG 24 hr tablet Take 120 mg by mouth daily.     • magnesium gluconate (MAGONATE) 500 MG tablet Take  by mouth.     • ondansetron ODT (ZOFRAN-ODT) 4 MG disintegrating tablet Take 1 tablet by mouth Every 6 (Six) Hours As Needed for Nausea or Vomiting. 10 tablet 0   • sirolimus (RAPAMUNE)  "0.5 MG tablet tablet Take 1 mg by mouth Daily.     • spironolactone (ALDACTONE) 50 MG tablet Take 50 mg by mouth every morning.     • traMADol (ULTRAM) 50 MG tablet Take  by mouth Every 6 (Six) Hours.       No current facility-administered medications for this visit.            Review of Systems:     Constitutional:  Denies recent weight loss, weight gain, fever or chills, no change in exercise tolerance.     HENT:  Denies any hearing loss, epistaxis, hoarseness, or difficulty speaking.     Eyes: Wears eyeglasses or contact lenses.    Respiratory:  Denies dyspnea with exertion,no cough, wheezing, or hemoptysis.     Cardiovascular: Negative for palpations, chest pain, orthopnea, PND, peripheral edema, syncope, or claudication.     Gastrointestinal: History of GI bleed and liver transplant    Endocrine: Negative for cold intolerance, heat intolerance, polydipsia, polyphagia and polyuria. Denies any history of weight change, polydipsia, polyuria.     Genitourinary: Negative.      Musculoskeletal: Denies any history of arthritic symptoms or back problems.     Skin:  Denies any change in hair or nails, rashes, or skin lesions.     Allergic/Immunologic: Negative.  Negative for environmental allergies, food allergies and immunocompromised state.     Neurological:  Denies any history of recurrent headaches, strokes, TIA, or seizure disorder.     Hematological: Denies any food allergies, seasonal allergies, bleeding disorders, or lymphadenopathy.     Psychiatric/Behavioral: Denies any history of depression, substance abuse, or change in cognitive function.       OBJECTIVE:    /88 (BP Location: Left arm, Patient Position: Sitting, Cuff Size: Adult)   Pulse 71   Ht 162.6 cm (64.02\")   Wt 65.8 kg (145 lb)   LMP 07/18/1971 (Within Months)   SpO2 98%   BMI 24.88 kg/m²       Physical Exam:     Constitutional: Cooperative, alert and oriented, well-developed, well-nourished, in no acute distress.     HENT:   Head: " Normocephalic, normal hair patterns, no masses or tenderness.  Ears, Nose, and Throat: No gross abnormalities. No pallor or cyanosis. Dentition good.   Eyes: EOMS intact, PERRL, conjunctivae and lids unremarkable. Fundoscopic exam and visual fields not performed.   Neck: No palpable masses or adenopathy, no thyromegaly, no JVD, carotid pulses are full and equal bilaterally and without  Bruits.     Cardiovascular: Regular rhythm, S1 and S2 normal, no S3 or S4. Apical impulse not displaced. No murmurs, gallops, or rubs detected.     Pulmonary/Chest: Chest: normal symmetry, no tenderness to palpation, normal respiratory excursion, no intercostal retraction, no use of accessory muscles. Pulmonary: Normal breath sounds. No rales or rhonchi.    Abdominal: Abdomen soft, bowel sounds normoactive, no masses, no hepatosplenomegaly, non-tender, no bruits.     Musculoskeletal: No deformities, clubbing, cyanosis, erythema, or edema observed. There are no spinal abnormalities noted. Normal muscle strength and tone. Pulses full and equal in all extremities, no bruits auscultated.     Neurological: No gross motor or sensory deficits noted, affect appropriate, oriented to time, person, place.     Skin: Warm and dry to the touch, no apparent skin lesions or masses noted.     Psychiatric: She has a normal mood and affect. Her behavior is normal. Judgment and thought content normal.         Procedures      Lab Results   Component Value Date    WBC 8.69 07/10/2019    HGB 14.3 07/10/2019    HCT 43.1 07/10/2019    MCV 92.5 07/10/2019     07/10/2019     Lab Results   Component Value Date    GLUCOSE 159 (H) 07/10/2019    BUN 42 (H) 07/10/2019    CREATININE 1.71 (H) 07/10/2019    EGFRIFNONA 29 (L) 07/10/2019    BCR 24.6 07/10/2019    CO2 24.0 07/10/2019    CALCIUM 9.5 07/10/2019    ALBUMIN 4.00 07/10/2019    AST 25 07/10/2019    ALT 9 07/10/2019     No results found for: CHOL  No results found for: TRIG  No results found for: HDL  No  components found for: LDLCALC  No results found for: LDL  No results found for: HDLLDLRATIO  No components found for: CHOLHDL  No results found for: HGBA1C  No results found for: TSH, E2JMDAF, R0PEWTO, THYROIDAB        ASSESSMENT AND PLAN:  Congestive heart failure compensated due to systolic and diastolic dysfunction  #2 hypertension hypertensive heart disease   #3 paroxysmal atrial fibrillation no further recurrence   #4 Parkinson disease   #5 status post liver transplant     Clinically, no sign of any cardiac decompensation based on the clinical history physical finding.  No e vidence of ongoing ischemia at the time of evaluation.  She is a pleased with the clinical outcome.  She had history of for GI bleed requiring discontinuation of oral anticoagulation more than 4 years ago refused reinitiation and not interested in watchman.  At present recommend to continue atorvastatin for management of hyperlipidemia, carbidopa for Parkinson disease, carvedilol and Lasix / aldactone with history of congestive heart failure on the basis of systolic and diastolic dysfunction compensated. EKG discussed with the patient  We will get hepatic lipid profile as a part of lipid monitoring    Diagnoses and all orders for this visit:    1. Paroxysmal atrial fibrillation (CMS/HCC) (Primary)  -     ECG 12 Lead    2. Chronic heart failure with preserved ejection fraction (CMS/HCC)    3. Essential hypertension    4. Hyperlipidemia, unspecified hyperlipidemia type    5. Left bundle branch block          Lo Jade MD  11/10/2020  11:09 CST

## 2020-11-24 NOTE — TELEPHONE ENCOUNTER
Chris Paulo has requested a refill on her medication. Last office visit : 11/27/2019   Next office visit : 12/7/2020   Last medication refill :10/31/19        Requested Prescriptions     Pending Prescriptions Disp Refills    carbidopa-levodopa (SINEMET)  MG per tablet 90 tablet 5     Sig: Take 1 1/2 pill each am and afternoon Please have patient call office for appointment before next refill. *Dr Lashanda Mancera patient please.

## 2020-12-07 ENCOUNTER — TELEPHONE (OUTPATIENT)
Dept: NEUROLOGY | Age: 75
End: 2020-12-07

## 2020-12-07 ENCOUNTER — OFFICE VISIT (OUTPATIENT)
Dept: NEUROLOGY | Age: 75
End: 2020-12-07
Payer: MEDICARE

## 2020-12-07 VITALS
DIASTOLIC BLOOD PRESSURE: 87 MMHG | HEIGHT: 67 IN | HEART RATE: 74 BPM | WEIGHT: 144 LBS | SYSTOLIC BLOOD PRESSURE: 149 MMHG | BODY MASS INDEX: 22.6 KG/M2

## 2020-12-07 PROCEDURE — 1090F PRES/ABSN URINE INCON ASSESS: CPT | Performed by: PSYCHIATRY & NEUROLOGY

## 2020-12-07 PROCEDURE — 4040F PNEUMOC VAC/ADMIN/RCVD: CPT | Performed by: PSYCHIATRY & NEUROLOGY

## 2020-12-07 PROCEDURE — 1123F ACP DISCUSS/DSCN MKR DOCD: CPT | Performed by: PSYCHIATRY & NEUROLOGY

## 2020-12-07 PROCEDURE — G8427 DOCREV CUR MEDS BY ELIG CLIN: HCPCS | Performed by: PSYCHIATRY & NEUROLOGY

## 2020-12-07 PROCEDURE — G8400 PT W/DXA NO RESULTS DOC: HCPCS | Performed by: PSYCHIATRY & NEUROLOGY

## 2020-12-07 PROCEDURE — 99214 OFFICE O/P EST MOD 30 MIN: CPT | Performed by: PSYCHIATRY & NEUROLOGY

## 2020-12-07 PROCEDURE — 1036F TOBACCO NON-USER: CPT | Performed by: PSYCHIATRY & NEUROLOGY

## 2020-12-07 PROCEDURE — G8420 CALC BMI NORM PARAMETERS: HCPCS | Performed by: PSYCHIATRY & NEUROLOGY

## 2020-12-07 PROCEDURE — G8484 FLU IMMUNIZE NO ADMIN: HCPCS | Performed by: PSYCHIATRY & NEUROLOGY

## 2020-12-07 NOTE — TELEPHONE ENCOUNTER
Called pt to let her know her appt with [facility] DR FRANCISCA RODRIGUEZ has been canceled and rescheduled. [facility] DR FRANCISCA RODRIGUEZ has had an emergency come up. Pt did not answer or have a voicemail box.

## 2020-12-07 NOTE — PROGRESS NOTES
level: Not on file   Occupational History    Not on file   Social Needs    Financial resource strain: Not on file    Food insecurity     Worry: Not on file     Inability: Not on file    Transportation needs     Medical: Not on file     Non-medical: Not on file   Tobacco Use    Smoking status: Never Smoker    Smokeless tobacco: Never Used   Substance and Sexual Activity    Alcohol use: No    Drug use: No    Sexual activity: Not on file   Lifestyle    Physical activity     Days per week: Not on file     Minutes per session: Not on file    Stress: Not on file   Relationships    Social connections     Talks on phone: Not on file     Gets together: Not on file     Attends [employer] service: Not on file     Active member of club or organization: Not on file     Attends meetings of clubs or organizations: Not on file     Relationship status: Not on file    Intimate partner violence     Fear of current or ex partner: Not on file     Emotionally abused: Not on file     Physically abused: Not on file     Forced sexual activity: Not on file   Other Topics Concern    Not on file   Social History Narrative    Not on file       Review of Systems    Constitutional: ?Fever ? Sweats ? Chills ? Recent Injury ? Denies all unless marked   HENT:?Headache ? Head Injury ? Sore Throat ? Ear Pain ? Dizziness ? Hearing Loss ? Denies all unless marked   Spine: ? Neck pain ? Back pain ? Sciaticia ? Denies all unless marked   Cardiovascular:?Chest Pain ? Palpitations ? Heart Disease ? Denies all unless marked   Pulmonary: ? Shortness of Breath ? Cough ? Denies all unless marked   Gastrointestinal: ?Abdominal Pain ? Blood in Stool ? Diarrhea ? Constipation ? Nausea ? Vomiting ? Denies all unless marked   Genitourinary: ? Dysuria ? Frequency ? Incontinence ? Urgency ? Denies all unless marked   Musculoskeletal: ? Arthralgia ? Myalgias ? Muscle cramps ? Muscle twitches   ? Denies all unless marked   Extremities: ? Pain ? Swelling ?  Denies all unless marked   Skin:? Rash ? Color Change ? Denies all unless marked   Neurological:? Visual Disturbance ? Double Vision ? Slurred Speech ? Trouble swallowing ? Vertigo ? Tingling ? Numbness ? Weakness ? Loss of Balance   ? Loss of Consciousness ? Memory Loss ? Seizures ? Denies all unless marked   Psychiatric/Behavioral:? Depression ? Anxiety ? Denies all unless marked   Sleep: ? Insomnia ? Sleep Disturbance ? Snoring ? Restless Legs ? Daytime Sleepiness ? Sleep Apnea ? Denies all unless marked            Current Outpatient Medications   Medication Sig Dispense Refill    carbidopa-levodopa (SINEMET)  MG per tablet Take 2 pill each am and afternoon. May take an extra pill in the evening as needed 150 tablet 5    allopurinol (ZYLOPRIM) 100 MG tablet Take 1 tablet by mouth 3 times daily  5    carvedilol (COREG) 25 MG tablet Take 25 mg by mouth 2 times daily (with meals)      isosorbide mononitrate (IMDUR) 120 MG extended release tablet Take 120 mg by mouth daily      sirolimus (RAPAMUNE) 0.5 MG TABS tablet Take 0.5 mg by mouth daily      spironolactone (ALDACTONE) 50 MG tablet Take 50 mg by mouth daily      furosemide (LASIX) 40 MG tablet Take 40 mg by mouth 2 times daily      aspirin 81 MG tablet Take 81 mg by mouth daily      atorvastatin (LIPITOR) 20 MG tablet Take 20 mg by mouth daily      magnesium gluconate (MAGONATE) 500 MG tablet Take 500 mg by mouth 2 times daily      traMADol (ULTRAM) 50 MG tablet Take 50 mg by mouth every 6 hours as needed for Pain       No current facility-administered medications for this visit. BP (!) 149/87   Pulse 74   Ht 5' 7\" (1.702 m)   Wt 144 lb (65.3 kg)   BMI 22.55 kg/m²       Constitutional - well developed, well nourished.     Eyes - conjunctiva normal.  Pupils react to light  Ear, nose, throat -hearing intact to finger rub No scars, masses, or lesions over external nose or ears, no atrophy of tongue  Neck-symmetric, no masses noted, no jugular occasionally words are mis-transcribed.)

## 2020-12-10 ENCOUNTER — TELEPHONE (OUTPATIENT)
Dept: CARDIOLOGY | Facility: CLINIC | Age: 75
End: 2020-12-10

## 2020-12-10 NOTE — TELEPHONE ENCOUNTER
Called patient to remind her of lab work Dr. Jade ordered on her  11/10/20, one lab being fasting, we ask that she only have water or black coffee before the lab.     She was understanding

## 2021-06-07 ENCOUNTER — OFFICE VISIT (OUTPATIENT)
Dept: NEUROLOGY | Age: 76
End: 2021-06-07
Payer: COMMERCIAL

## 2021-06-07 VITALS
SYSTOLIC BLOOD PRESSURE: 138 MMHG | HEIGHT: 67 IN | HEART RATE: 69 BPM | WEIGHT: 139 LBS | BODY MASS INDEX: 21.82 KG/M2 | DIASTOLIC BLOOD PRESSURE: 74 MMHG

## 2021-06-07 DIAGNOSIS — Z86.39 HISTORY OF HYPERLIPIDEMIA: ICD-10-CM

## 2021-06-07 DIAGNOSIS — G20 PARKINSON DISEASE (HCC): Primary | ICD-10-CM

## 2021-06-07 DIAGNOSIS — Z86.79 HISTORY OF HYPERTENSION: ICD-10-CM

## 2021-06-07 PROCEDURE — 99214 OFFICE O/P EST MOD 30 MIN: CPT | Performed by: PSYCHIATRY & NEUROLOGY

## 2021-06-07 RX ORDER — SITAGLIPTIN 25 MG/1
1 TABLET, FILM COATED ORAL EVERY MORNING
COMMUNITY
Start: 2021-05-27

## 2021-06-07 NOTE — PROGRESS NOTES
6/7/2021)       No current facility-administered medications for this visit. /74   Pulse 69   Ht 5' 7\" (1.702 m)   Wt 139 lb (63 kg)   BMI 21.77 kg/m²       Constitutional - well developed, well nourished. Eyes - conjunctiva normal.  Pupils react to light  Ear, nose, throat -hearing intact to finger rub No scars, masses, or lesions over external nose or ears, no atrophy of tongue  Neck-symmetric, no masses noted, no jugular vein distension. No bruits noted. Respiration- chest wall appears symmetric, good expansion,   normal effort without use of accessory muscles  Cardiovascular- RRR  Musculoskeletal - no significant wasting of muscles noted, no bony deformities, gait no gross ataxia. Decreased range of motion of the left shoulder  Extremities-no clubbing, cyanosis or edema  Skin - warm, dry, and intact. No rash, erythema, or pallor. Psychiatric - mood, affect, and behavior appear normal.      Neurological exam  Awake, alert, fluent oriented x 3 appropriate affect  Attention and concentration appear appropriate  Recent and remote memory appears unremarkable  Speech normal without dysarthria  No clear issues with language of fund of knowledge    Cranial Nerve Exam   CN II- Visual fields grossly unremarkable. VA adequate. PERRLA. CN III, IV,VI-EOMI, No nystagmus, conjugate eye movements, no ptosis  CN VII-no facial asymmetry. Masked face  CN VIII-Hearing intact to finger rub  CN IX and X- Palate elevates in midline  CN XI-good shoulder shrug  CN XII-Tongue midline with no fasciculations or fibrillations    Motor Exam  V/V throughout upper and lower extremities bilaterally, no cogwheeling, normal tone      Reflexes   Trace  throughout    Tremors- Occasional resting tremor in the left hand. Chin tremor is noted distraction. There is cogwheeling and bradykinesia in the left upper extremity. Gait  Antalgic  Coordination  Finger to nose-unremarkable          Assessment    ICD-10-CM    1. Parkinson disease ([facility] [address]  G20    2. History of hypertension  Z86.79    3. History of hyperlipidemia  Z86.39        Continue current treatment with Sinemet. Consider adding Comtan later. No changes made with Parkinson medications today. Blood pressure and hyperlipidemia are treated. Plan    Return in about 6 months (around 12/7/2021).     (Please note that portions of this note were completed with a voice recognition program. Efforts were made to edit the dictations but occasionally words are mis-transcribed.)

## 2021-06-22 ENCOUNTER — OFFICE VISIT (OUTPATIENT)
Dept: CARDIOLOGY | Facility: CLINIC | Age: 76
End: 2021-06-22

## 2021-06-22 VITALS
SYSTOLIC BLOOD PRESSURE: 140 MMHG | DIASTOLIC BLOOD PRESSURE: 72 MMHG | RESPIRATION RATE: 18 BRPM | HEIGHT: 64 IN | HEART RATE: 70 BPM | BODY MASS INDEX: 24.75 KG/M2 | WEIGHT: 145 LBS

## 2021-06-22 DIAGNOSIS — I10 ESSENTIAL HYPERTENSION: ICD-10-CM

## 2021-06-22 DIAGNOSIS — E78.5 HYPERLIPIDEMIA, UNSPECIFIED HYPERLIPIDEMIA TYPE: ICD-10-CM

## 2021-06-22 DIAGNOSIS — I44.7 LEFT BUNDLE BRANCH BLOCK: ICD-10-CM

## 2021-06-22 DIAGNOSIS — I50.32 CHRONIC HEART FAILURE WITH PRESERVED EJECTION FRACTION (HCC): ICD-10-CM

## 2021-06-22 DIAGNOSIS — I48.0 PAROXYSMAL ATRIAL FIBRILLATION (HCC): Primary | ICD-10-CM

## 2021-06-22 PROCEDURE — 99214 OFFICE O/P EST MOD 30 MIN: CPT | Performed by: INTERNAL MEDICINE

## 2021-06-22 NOTE — PROGRESS NOTES
Dora Velazquez  75 y.o. female    6/22/2021     1. Paroxysmal atrial fibrillation (CMS/HCC)    2. Chronic heart failure with preserved ejection fraction (CMS/HCC)    3. Essential hypertension    4. Left bundle branch block    5. Hyperlipidemia, unspecified hyperlipidemia type        History of Present Illness:    Patient's Body mass index is 24.87 kg/m². BMI is within normal parameters. No follow-up required.   .  75 years old patient last was seen almost more than 2 years ago presented routine follow-up no symptom suggestive of cardiac decompensation such orthopnea PND lightheaded dizziness evaluated by Dr. Baltazar in the past with history of hypertension, hypertensive heart disease, diastolic dysfunction, Parkinson disease, liver transplant did remarkably well and history of paroxysmal atrial fibrillation was on oral anticoagulation discontinued due to significant GI bleed more than 3-4 years ago.  She also had history of paroxysmal atrial fibrillation no further recurrence.  Patient denies orthopnea, PND, nauseous, vomiting.  Denied polydipsia polyuria.  Denies any fever cough which was dysuria or hematuria.   last echocardiogram study reported ejection fraction 30-35% 2014.Repeat  echo has ejection fraction improved to 45%  We'll arrange an echo cardiac study to reassess the left and a systolic function.     ECHO 7/11/2018  ·  Left atrial cavity size is mildly dilated.  · The left ventricular cavity is mildly dilated.  · Left ventricular systolic function is low normal. Estimated EF = 45%.  · Left ventricular diastolic dysfunction (grade I a) consistent with impaired relaxation.  Left ventricular wall thickness is consistent with mild concentric hypertrophy      ECHO 2014  1. Depressed left ventricular systolic function with ejection fraction        of 30% to 35%.  2. Mild concentric left ventricular hypertrophy  3. Grade 1 diastolic dysfunction of the left ventricular myocardium      SUBJECTIVE:    Allergies    Allergen Reactions   • Lortab [Hydrocodone-Acetaminophen] Other (See Comments)     Can cause problems for liver has had transplant    • Penicillins Anaphylaxis and Hives   • Tape Other (See Comments)     IV tape causes skin tears   • Codeine Hives         Past Medical History:   Diagnosis Date   • Arthritis    • Backache    • Bilateral pleural effusion    • Blood in feces     Blood in feces symptom   • Capsulitis    • Cardiomyopathy (CMS/HCC)      HFpEF, improved      • Chest pain    • CHF (congestive heart failure) (CMS/HCC)    • Chronic anemia    • Chronic hepatitis C (CMS/HCC)    • Degenerative joint disease involving multiple joints    • Dilated cardiomyopathy (CMS/HCC)    • Dyslipidemia    • Dyspnea    • Edema of leg    • Epistaxis    • Essential hypertension    • Fibromyalgia, primary    • GERD (gastroesophageal reflux disease)    • H/O endoscopy 06/30/2014    Colon endoscopy [id number]   • Headache    • Hemorrhoids     internal & external   • History of liver recipient (CMS/HCC)     S/P done at [facility] 2003      • Hyperlipemia    • Hyperlipidemia    • Hypertension    • Hypokalemia    • Left bundle branch block    • Metatarsalgia     Metatarsalgia - with fat pad atrophie      • Pap smear for cervical cancer screening 02/09/1996   • Paroxysmal atrial fibrillation (CMS/HCC)    • Polyp of sigmoid colon    • Salmonella arthritis (CMS/HCC)    • Transient cerebral ischemia     Unspecified         Past Surgical History:   Procedure Laterality Date   • BACK SURGERY  1995    Back Surgery (2)      • CARDIAC CATHETERIZATION  11/03/2005    Orders Not Yet Performed: 0       • CARPAL TUNNEL RELEASE  02/12/2007    Carpal tunnel surgery (1)     • CAUTERIZATION NASAL BLEEDERS  03/09/2000    Control nose/throat bleeding (1)      • CHOLECYSTECTOMY  2000   • COLONOSCOPY N/A 8/14/2019    Procedure: COLONOSCOPY;  Surgeon: Chaparro Mckeon MD;  Location: [facility] ENDOSCOPY;  Service: Gastroenterology   • LAPAROSCOPIC LYSIS OF  ADHESIONS  07/16/1992    Laparoscopy; lysis of adhesions (1)      • LIVER BIOPSY  01/05/1998    Needle biopsy of liver 04511 (1)      • LIVER TRANSPLANTATION  2003    Anesth, for liver transplant (1)      • SALPINGO OOPHORECTOMY  1974    Salpingo-oophorectomy (1)      • SHOULDER SURGERY  08/29/2007    Shoulder surgery procedure (1)      • TOTAL ABDOMINAL HYSTERECTOMY  1968    Total abd hysterectomy (1)            Family History   Problem Relation Age of Onset   • Heart attack Father    • Stroke Father    • Cancer Other         Other   • Heart disease Other    • Hypertension Other    • Cholelithiasis Other          Social History     Socioeconomic History   • Marital status: Single     Spouse name: Not on file   • Number of children: Not on file   • Years of education: Not on file   • Highest education level: Not on file   Tobacco Use   • Smoking status: Never Smoker   • Smokeless tobacco: Never Used   Vaping Use   • Vaping Use: Never used   Substance and Sexual Activity   • Alcohol use: No   • Drug use: No   • Sexual activity: Defer         Current Outpatient Medications   Medication Sig Dispense Refill   • allopurinol (ZYLOPRIM) 100 MG tablet Take 100 mg by mouth 3 (Three) Times a Day.     • aspirin 81 MG tablet Take 81 mg by mouth daily.     • atorvastatin (LIPITOR) 40 MG tablet Take 40 mg by mouth every night.     • carbidopa-levodopa (SINEMET)  MG per tablet take 1 tab three times daily     • carvedilol (COREG) 25 MG tablet Take 25 mg by mouth 2 (two) times a day.     • furosemide (LASIX) 40 MG tablet Take 40 mg by mouth daily.     • isosorbide mononitrate (IMDUR) 120 MG 24 hr tablet Take 120 mg by mouth daily.     • magnesium gluconate (MAGONATE) 500 MG tablet Take  by mouth.     • ondansetron ODT (ZOFRAN-ODT) 4 MG disintegrating tablet Take 1 tablet by mouth Every 6 (Six) Hours As Needed for Nausea or Vomiting. 10 tablet 0   • sirolimus (RAPAMUNE) 0.5 MG tablet tablet Take 1 mg by mouth Daily.     •  "spironolactone (ALDACTONE) 50 MG tablet Take 50 mg by mouth every morning.     • traMADol (ULTRAM) 50 MG tablet Take  by mouth Every 6 (Six) Hours.       No current facility-administered medications for this visit.           Review of Systems:     Constitutional:  Denies recent weight loss, weight gain, no change in exercise tolerance.     HENT:  Denies any hearing loss, epistaxis, hoarseness  Eyes: Wears eyeglasses or contact lenses.    Respiratory:  Denies dyspnea with exertion,no cough    Cardiovascular: See H&P    Gastrointestinal: History of GI bleed and liver transplant    Endocrine: Negative for cold intolerance, heat intolerance, polydipsia, polyphagia and polyuria.     Genitourinary: Negative.      Musculoskeletal: History of osteoarthritis    Skin:  Denies   rashes, or skin lesions.     Allergic/Immunologic: Negative.  Negative for environmental allergies, food allergies and immunocompromised state.     Neurological: History of Parkinson disease and tremor    Hematological: Denies any food allergies, seasonal allergies, bleeding disorders    Psychiatric/Behavioral: Denies any history of depression,       OBJECTIVE:    /72 (BP Location: Left arm, Patient Position: Sitting, Cuff Size: Adult)   Pulse 70   Resp 18   Ht 162.6 cm (64.02\")   Wt 65.8 kg (145 lb)   LMP 07/18/1971 (Within Months)   BMI 24.87 kg/m²       Physical Exam:     Constitutional: Cooperative, alert and oriented, well-developed, well-nourished, in no acute distress.     HENT:   Head: Normocephalic, conjunctive is pink thyroid is nonpalpable no jugular is distention    Cardiovascular: Regular rhythm, S1 and S2 normal, no S3 or S4. Apical impulse not displaced. No murmurs, gallops, or rubs detected.     Pulmonary/Chest: Chest: Positive bilateral air entry no rales no wheezing    Abdominal: Abdomen soft, bowel sounds normoactive, no masses,    Musculoskeletal: No deformities, positive peripheral pulses no edema    Neurological: No " gross motor or sensory deficits noted, affect appropriate, oriented to time, person, place.     Skin: Warm and dry to the touch, no apparent skin lesions or masses noted.     Psychiatric: She has a normal mood and affect. Her behavior is normal. Judgment and thought content normal.         Procedures      Lab Results   Component Value Date    WBC 8.69 07/10/2019    HGB 14.3 07/10/2019    HCT 43.1 07/10/2019    MCV 92.5 07/10/2019     07/10/2019     Lab Results   Component Value Date    GLUCOSE 159 (H) 07/10/2019    BUN 42 (H) 07/10/2019    CREATININE 1.71 (H) 07/10/2019    EGFRIFNONA 29 (L) 07/10/2019    BCR 24.6 07/10/2019    CO2 24.0 07/10/2019    CALCIUM 9.5 07/10/2019    ALBUMIN 4.00 07/10/2019    AST 25 07/10/2019    ALT 9 07/10/2019     No results found for: CHOL  No results found for: TRIG  No results found for: HDL  No components found for: LDLCALC  No results found for: LDL  No results found for: HDLLDLRATIO  No components found for: CHOLHDL  No results found for: HGBA1C  No results found for: TSH, Q7LHJHZ, J5OKQFH, THYROIDAB        ASSESSMENT AND PLAN:  Congestive heart failure compensated due to systolic and diastolic dysfunction    Compensated no evidence of congestive heart failure she is euvolemic and well perfused continue carvedilol and diuretic      #2 hypertension hypertensive heart disease    Good blood pressure continue carvedilol continue spironolactone continue isosorbide     #3 paroxysmal atrial fibrillation no further recurrence     Continue carvedilol    [id number] score    Is 4 patient refused oral anticoagulation             Diagnoses and all orders for this visit:    1. Paroxysmal atrial fibrillation (CMS/HCC) (Primary)    2. Chronic heart failure with preserved ejection fraction (CMS/HCC)    3. Essential hypertension    4. Left bundle branch block    5. Hyperlipidemia, unspecified hyperlipidemia type          Lo Jade MD  6/22/2021  15:52 CDT

## 2021-12-08 ENCOUNTER — OFFICE VISIT (OUTPATIENT)
Dept: NEUROLOGY | Age: 76
End: 2021-12-08
Payer: COMMERCIAL

## 2021-12-08 VITALS
BODY MASS INDEX: 21.07 KG/M2 | WEIGHT: 139 LBS | DIASTOLIC BLOOD PRESSURE: 72 MMHG | SYSTOLIC BLOOD PRESSURE: 116 MMHG | HEIGHT: 68 IN | HEART RATE: 78 BPM | RESPIRATION RATE: 16 BRPM

## 2021-12-08 DIAGNOSIS — Z86.79 HISTORY OF HYPERTENSION: ICD-10-CM

## 2021-12-08 DIAGNOSIS — Z86.39 HISTORY OF HYPERLIPIDEMIA: ICD-10-CM

## 2021-12-08 DIAGNOSIS — G20 PARKINSON DISEASE (HCC): Primary | ICD-10-CM

## 2021-12-08 PROCEDURE — 99214 OFFICE O/P EST MOD 30 MIN: CPT | Performed by: PSYCHIATRY & NEUROLOGY

## 2021-12-08 NOTE — PROGRESS NOTES
Chief Complaint   Patient presents with    6 Month Follow-Up       Mabel Llanes is a 67y.o. year old female who is seen for evaluation of her tremor. She has about a 2 year history of tremor in the left hand and it is occasional noticed in her chin. She has decreased arm swing on the left but does have a history of an injury there remotely. There is no family history of tremor. The patient had a liver transplant in 2003 there is been no changes in her medications for several years. She denies any trouble with her gait per se. Otherwise any focal neurological difficulties. Old records are reviewed. She was last seen here 6/21. When I saw her 11/19  I felt that she had Parkinson's disease. She was started on Sinemet . Currently taking 2 pills in the morning and in the evening and doing well. Overall doing very well with her tremors. She has hypertension and hyperlipidemia and those are controlled. She has worsening arthritis in her right knee which he has her difficulty getting into the standing position. She has been having occasional falls. Active Ambulatory Problems     Diagnosis Date Noted    No Active Ambulatory Problems     Resolved Ambulatory Problems     Diagnosis Date Noted    No Resolved Ambulatory Problems     Past Medical History:   Diagnosis Date    Congenital heart disease     Hyperlipidemia     Liver transplanted [facility])        Past Surgical History:   Procedure Laterality Date    BACK SURGERY      x 10    BLEPHAROPLASTY Bilateral 11/27/2020    HYSTERECTOMY      LIVER TRANSPLANT      SHOULDER SURGERY         History reviewed. No pertinent family history.     Allergies   Allergen Reactions    Codeine     Hydrocodone-Acetaminophen     Penicillins        Social History     Socioeconomic History    Marital status:      Spouse name: Not on file    Number of children: Not on file    Years of education: Not on file    Highest education level: Not on file   Occupational History  Not on file   Tobacco Use    Smoking status: Never Smoker    Smokeless tobacco: Never Used   Vaping Use    Vaping Use: Never used   Substance and Sexual Activity    Alcohol use: No    Drug use: No    Sexual activity: Not on file   Other Topics Concern    Not on file   Social History Narrative    Not on file     Social Determinants of Health     Financial Resource Strain:     Difficulty of Paying Living Expenses: Not on file   Food Insecurity:     Worried About Running Out of Food in the Last Year: Not on file    Yue of Food in the Last Year: Not on file   Transportation Needs:     Lack of Transportation (Medical): Not on file    Lack of Transportation (Non-Medical): Not on file   Physical Activity:     Days of Exercise per Week: Not on file    Minutes of Exercise per Session: Not on file   Stress:     Feeling of Stress : Not on file   Social Connections:     Frequency of Communication with Friends and Family: Not on file    Frequency of Social Gatherings with Friends and Family: Not on file    Attends Hindu Services: Not on file    Active Member of [address] [place] [address] or Organizations: Not on file    Attends Club or Organization Meetings: Not on file    Marital Status: Not on file   Intimate Partner Violence:     Fear of Current or Ex-Partner: Not on file    Emotionally Abused: Not on file    Physically Abused: Not on file    Sexually Abused: Not on file   Housing Stability:     Unable to Pay for Housing in the Last Year: Not on file    Number of [place] in the Last Year: Not on file    Unstable Housing in the Last Year: Not on file       Review of Systems      Constitutional: []? Fever []? Sweats []? Chills []? Recent Injury   [x]? Denies all unless marked  HENT:[]? Headache  []? Head Injury  []? Sore Throat  []? Ear Pain  []? Dizziness []? Hearing Loss   [x]? Denies all unless marked  Musculoskeletal: []? Arthralgia  []? Myalgias []? Muscle cramps  []? Muscle twitches   [x]?  Denies all unless marked   Spine:  []? Neck pain  []? Back pain  []? Sciaticia  [x]? Denies all unless marked  Neurological:[]? Visual Disturbance []? Double Vision []? Slurred Speech []? Trouble swallowing  []? Vertigo []? Tingling []? Numbness []? Weakness []? Loss of Balance   []? Loss of Consciousness []? Memory Loss []? Seizures  [x]? Denies all unless marked  Psychiatric/Behavioral:[]? Depression []? Anxiety  [x]? Denies all unless marked  Sleep: []? Insomnia []? Sleep Disturbance []? Snoring []? Restless Legs []? Daytime Sleepiness []? Sleep Apnea  [x]? Denies all unless marked      Current Outpatient Medications   Medication Sig Dispense Refill    JANUVIA 25 MG tablet Take 1 tablet by mouth every morning      carbidopa-levodopa (SINEMET)  MG per tablet Take 2 pill each am and afternoon. May take an extra pill in the evening as needed 150 tablet 5    allopurinol (ZYLOPRIM) 100 MG tablet Take 1 tablet by mouth 3 times daily  5    carvedilol (COREG) 12.5 MG tablet Take 12.5 mg by mouth 2 times daily (with meals)       sirolimus (RAPAMUNE) 1 MG tablet Take 1 mg by mouth 2 times daily       spironolactone (ALDACTONE) 25 MG tablet Take 25 mg by mouth daily       furosemide (LASIX) 40 MG tablet Take 40 mg by mouth 2 times daily      aspirin 81 MG tablet Take 81 mg by mouth daily      atorvastatin (LIPITOR) 20 MG tablet Take 20 mg by mouth daily      magnesium gluconate (MAGONATE) 500 MG tablet Take 500 mg by mouth daily       traMADol (ULTRAM) 50 MG tablet Take 50 mg by mouth every 6 hours as needed for Pain      isosorbide mononitrate (IMDUR) 120 MG extended release tablet Take 120 mg by mouth daily (Patient not taking: Reported on 6/7/2021)       No current facility-administered medications for this visit. /72   Pulse 78   Resp 16   Ht 5' 7.6\" (1.717 m)   Wt 139 lb (63 kg)   BMI 21.39 kg/m²       Constitutional - well developed, well nourished.     Eyes - conjunctiva normal.  Pupils react to light  Ear, nose, throat -hearing intact to finger rub No scars, masses, or lesions over external nose or ears, no atrophy of tongue  Neck-symmetric, no masses noted, no jugular vein distension. No bruits noted. Respiration- chest wall appears symmetric, good expansion,   normal effort without use of accessory muscles  Cardiovascular- RRR  Musculoskeletal - no significant wasting of muscles noted, no bony deformities, gait no gross ataxia. Decreased range of motion of the left shoulder  Extremities-no clubbing, cyanosis or edema  Skin - warm, dry, and intact. No rash, erythema, or pallor. Psychiatric - mood, affect, and behavior appear normal.      Neurological exam  Awake, alert, fluent oriented x 3 appropriate affect  Attention and concentration appear appropriate  Recent and remote memory appears unremarkable  Speech normal without dysarthria  No clear issues with language of fund of knowledge    Cranial Nerve Exam   CN II- Visual fields grossly unremarkable. VA adequate. PERRLA. CN III, IV,VI-EOMI, No nystagmus, conjugate eye movements, no ptosis  CN VII-no facial asymmetry. Masked face  CN VIII-Hearing intact to finger rub  CN IX and X- Palate elevates in midline  CN XI-good shoulder shrug  CN XII-Tongue midline with no fasciculations or fibrillations    Motor Exam  V/V throughout upper and lower extremities bilaterally, no cogwheeling, normal tone      Reflexes   Trace  throughout    Tremors- Occasional resting tremor in the left hand. Chin tremor is noted distraction. There is cogwheeling and bradykinesia in the left upper extremity. Gait  Antalgic. Decreased arm swing. Thoracic kyphosis  Coordination  Finger to nose-unremarkable          Assessment    ICD-10-CM    1. Parkinson disease ([address]  G20 External Referral To Physical Therapy   2. History of hypertension  Z86.79    3. History of hyperlipidemia  Z86.39        Continue current treatment with Sinemet. Consider adding Comtan later.   No

## 2022-02-08 ENCOUNTER — OFFICE VISIT (OUTPATIENT)
Dept: CARDIOLOGY | Facility: CLINIC | Age: 77
End: 2022-02-08

## 2022-02-08 VITALS
WEIGHT: 140 LBS | DIASTOLIC BLOOD PRESSURE: 62 MMHG | OXYGEN SATURATION: 96 % | TEMPERATURE: 98.2 F | HEIGHT: 64 IN | SYSTOLIC BLOOD PRESSURE: 110 MMHG | BODY MASS INDEX: 23.9 KG/M2 | HEART RATE: 67 BPM

## 2022-02-08 DIAGNOSIS — I44.7 LEFT BUNDLE BRANCH BLOCK: ICD-10-CM

## 2022-02-08 DIAGNOSIS — I50.32 CHRONIC HEART FAILURE WITH PRESERVED EJECTION FRACTION: ICD-10-CM

## 2022-02-08 DIAGNOSIS — E78.5 HYPERLIPIDEMIA, UNSPECIFIED HYPERLIPIDEMIA TYPE: ICD-10-CM

## 2022-02-08 DIAGNOSIS — G20 PARKINSON DISEASE (HCC): Primary | ICD-10-CM

## 2022-02-08 DIAGNOSIS — I48.0 PAROXYSMAL ATRIAL FIBRILLATION: ICD-10-CM

## 2022-02-08 DIAGNOSIS — I10 ESSENTIAL HYPERTENSION: Primary | ICD-10-CM

## 2022-02-08 PROCEDURE — 99213 OFFICE O/P EST LOW 20 MIN: CPT | Performed by: INTERNAL MEDICINE

## 2022-02-08 PROCEDURE — 93000 ELECTROCARDIOGRAM COMPLETE: CPT | Performed by: INTERNAL MEDICINE

## 2022-02-08 RX ORDER — SITAGLIPTIN 25 MG/1
25 TABLET, FILM COATED ORAL
COMMUNITY
Start: 2022-01-31

## 2022-02-08 NOTE — TELEPHONE ENCOUNTER
Patient called and voiced that she is needing a refill on her Rx for Sinemet. She voiced that she would like to get the refill today if possible. I voiced to her that I would get the message to Dr Diane Bro. Patient voiced thank you. Rx needs to go to [facility].

## 2022-02-08 NOTE — PROGRESS NOTES
Doar Velazquez  76 y.o. female    2/8/2022     1. Essential hypertension    2. Paroxysmal atrial fibrillation (HCC)    3. Chronic heart failure with preserved ejection fraction (HCC)    4. Hyperlipidemia, unspecified hyperlipidemia type    5. Left bundle branch block        History of Present Illness:    Patient's Body mass index is 24.02 kg/m². BMI is within normal parameters. No follow-up required.   .  76 years old patient presented for routine follow-up with history of sinus rhythm and nonspecific Tono interventricular conduction delay of left bundle branch block pattern. History of liver transplant followed at liver and paroxysmal atrial fibrillation with significant GI bleeding requiring discontinuation she refused to be reinitiated and alternate methods of anticoagulation such as watchman device procedure and pros and cons explained to the patient she would prefer to continue present medications presented routine follow-up no symptom suggestive of cardiac decompensation such orthopnea PND lightheaded dizziness evaluated by Dr. Baltazar in the past with history of hypertension, hypertensive heart disease, diastolic dysfunction, Parkinson disease, liver transplant did remarkably well and history of paroxysmal atrial fibrillation was on oral anticoagulation discontinued due to significant GI bleed more than 3-4 years ago.  She also had history of paroxysmal atrial fibrillation no further recurrence.  Patient denies orthopnea, PND, nauseous, vomiting.  Denied polydipsia polyuria.  Denies any fever cough which was dysuria or hematuria.   last echocardiogram study reported ejection fraction 30-35% 2014.Repeat  echo has ejection fraction improved to 45%  We'll arrange an echo cardiac study to reassess the left and a systolic function.     ECHO 7/11/2018  ·  Left atrial cavity size is mildly dilated.  · The left ventricular cavity is mildly dilated.  · Left ventricular systolic function is low normal. Estimated EF  = 45%.  · Left ventricular diastolic dysfunction (grade I a) consistent with impaired relaxation.  Left ventricular wall thickness is consistent with mild concentric hypertrophy      ECHO 2014  1. Depressed left ventricular systolic function with ejection fraction        of 30% to 35%.  2. Mild concentric left ventricular hypertrophy  3. Grade 1 diastolic dysfunction of the left ventricular myocardium      SUBJECTIVE:    Allergies   Allergen Reactions   • Lortab [Hydrocodone-Acetaminophen] Other (See Comments)     Can cause problems for liver has had transplant    • Penicillins Anaphylaxis and Hives   • Tape Other (See Comments)     IV tape causes skin tears   • Codeine Hives         Past Medical History:   Diagnosis Date   • Arthritis    • Backache    • Bilateral pleural effusion    • Blood in feces     Blood in feces symptom   • Capsulitis    • Cardiomyopathy (HCC)      HFpEF, improved      • Chest pain    • CHF (congestive heart failure) (HCC)    • Chronic anemia    • Chronic hepatitis C (HCC)    • Degenerative joint disease involving multiple joints    • Dilated cardiomyopathy (HCC)    • Dyslipidemia    • Dyspnea    • Edema of leg    • Epistaxis    • Essential hypertension    • Fibromyalgia, primary    • GERD (gastroesophageal reflux disease)    • H/O endoscopy 06/30/2014    Colon endoscopy 92010   • Headache    • Hemorrhoids     internal & external   • History of liver recipient (HCC)     S/P [facility] at [facility] 2003      • Hyperlipemia    • Hyperlipidemia    • Hypertension    • Hypokalemia    • Left bundle branch block    • Metatarsalgia     Metatarsalgia - with fat pad atrophie      • Pap smear for cervical cancer screening 02/09/1996   • Paroxysmal atrial fibrillation (HCC)    • Polyp of sigmoid colon    • Salmonella arthritis (HCC)    • Transient cerebral ischemia     Unspecified         Past Surgical History:   Procedure Laterality Date   • BACK SURGERY  1995    Back Surgery (2)      • CARDIAC  CATHETERIZATION  11/03/2005    Orders Not Yet Performed: 0       • CARPAL TUNNEL RELEASE  02/12/2007    Carpal tunnel surgery (1)     • CAUTERIZATION NASAL BLEEDERS  03/09/2000    Control nose/throat bleeding (1)      • CHOLECYSTECTOMY  2000   • COLONOSCOPY N/A 8/14/2019    Procedure: COLONOSCOPY;  Surgeon: Chaparro Mckeon MD;  Location: [facility] ENDOSCOPY;  Service: Gastroenterology   • LAPAROSCOPIC LYSIS OF ADHESIONS  07/16/1992    Laparoscopy; lysis of adhesions (1)      • LIVER BIOPSY  01/05/1998    Needle biopsy of liver 04883 (1)      • LIVER TRANSPLANTATION  2003    Anesth, for liver transplant (1)      • SALPINGO OOPHORECTOMY  1974    Salpingo-oophorectomy (1)      • SHOULDER SURGERY  08/29/2007    Shoulder surgery procedure (1)      • TOTAL ABDOMINAL HYSTERECTOMY  1968    Total abd hysterectomy (1)            Family History   Problem Relation Age of Onset   • Heart attack Father    • Stroke Father    • Cancer Other         Other   • Heart disease Other    • Hypertension Other    • Cholelithiasis Other          Social History     Socioeconomic History   • Marital status: Single   Tobacco Use   • Smoking status: Never Smoker   • Smokeless tobacco: Never Used   Vaping Use   • Vaping Use: Never used   Substance and Sexual Activity   • Alcohol use: No   • Drug use: No   • Sexual activity: Defer         Current Outpatient Medications   Medication Sig Dispense Refill   • allopurinol (ZYLOPRIM) 100 MG tablet Take 100 mg by mouth 3 (Three) Times a Day.     • aspirin 81 MG tablet Take 81 mg by mouth daily.     • atorvastatin (LIPITOR) 40 MG tablet Take 40 mg by mouth every night.     • carbidopa-levodopa (SINEMET)  MG per tablet take 1 tab three times daily     • carvedilol (COREG) 25 MG tablet Take 25 mg by mouth 2 (two) times a day.     • furosemide (LASIX) 40 MG tablet Take 40 mg by mouth daily.     • isosorbide mononitrate (IMDUR) 120 MG 24 hr tablet Take 120 mg by mouth daily.     • Januvia 25 MG tablet     "  • magnesium gluconate (MAGONATE) 500 MG tablet Take  by mouth.     • sirolimus (RAPAMUNE) 0.5 MG tablet tablet Take 1 mg by mouth Daily.     • spironolactone (ALDACTONE) 50 MG tablet Take 50 mg by mouth every morning.     • traMADol (ULTRAM) 50 MG tablet Take  by mouth Every 6 (Six) Hours.       No current facility-administered medications for this visit.           Review of Systems:     Constitutional:  Denies recent weight loss, weight gain, no change in exercise tolerance.     HENT:  Denies any hearing loss, epistaxis, hoarseness  Eyes: Wears eyeglasses or contact lenses.    Respiratory:  Denies dyspnea with exertion,no cough    Cardiovascular: See H&P    Gastrointestinal: History of GI bleed and liver transplant    Endocrine: Negative for cold intolerance, heat intolerance, polydipsia, polyphagia and polyuria.     Genitourinary: Negative.      Musculoskeletal: History of osteoarthritis    Skin:  Denies   rashes, or skin lesions.     Allergic/Immunologic: Negative.  Negative for environmental allergies, food allergies and immunocompromised state.     Neurological: History of Parkinson disease and tremor    Hematological: Denies any food allergies, seasonal allergies, bleeding disorders    Psychiatric/Behavioral: Denies any history of depression,       OBJECTIVE:    /62 (BP Location: Left arm, Patient Position: Sitting, Cuff Size: Adult)   Pulse 67   Temp 98.2 °F (36.8 °C)   Ht 162.6 cm (64.02\")   Wt 63.5 kg (140 lb)   LMP 07/18/1971 (Within Months)   SpO2 96%   BMI 24.02 kg/m²       Physical Exam:     Constitutional: Cooperative, alert and oriented, well-developed, well-nourished, in no acute distress.     HENT:   Head: Normocephalic, conjunctive is pink thyroid is nonpalpable no jugular is distention    Cardiovascular: Regular rhythm, S1 and S2 normal, no S3 or S4. Apical impulse not displaced. No murmurs, gallops, or rubs detected.     Pulmonary/Chest: Chest: Positive bilateral air entry no rales " no wheezing    Abdominal: Abdomen soft, bowel sounds normoactive, no masses,    Musculoskeletal: No deformities, positive peripheral pulses no edema    Neurological: No gross motor or sensory deficits noted, affect appropriate, oriented to time, person, place.     Skin: Warm and dry to the touch, no apparent skin lesions or masses noted.     Psychiatric: She has a normal mood and affect. Her behavior is normal. Judgment and thought content normal.         Procedures      Lab Results   Component Value Date    WBC 8.69 07/10/2019    HGB 14.3 07/10/2019    HCT 43.1 07/10/2019    MCV 92.5 07/10/2019     07/10/2019     Lab Results   Component Value Date    GLUCOSE 159 (H) 07/10/2019    BUN 42 (H) 07/10/2019    CREATININE 1.71 (H) 07/10/2019    EGFRIFNONA 29 (L) 07/10/2019    BCR 24.6 07/10/2019    CO2 24.0 07/10/2019    CALCIUM 9.5 07/10/2019    ALBUMIN 4.00 07/10/2019    AST 25 07/10/2019    ALT 9 07/10/2019     No results found for: CHOL  No results found for: TRIG  No results found for: HDL  No components found for: LDLCALC  No results found for: LDL  No results found for: HDLLDLRATIO  No components found for: CHOLHDL  No results found for: HGBA1C  No results found for: TSH, L8UUWPH, L0HPYWY, THYROIDAB        ASSESSMENT AND PLAN:  Congestive heart failure compensated due to systolic and diastolic dysfunction    Compensated no evidence of congestive heart failure she is euvolemic and well perfused continue carvedilol and diuretic  Will arrange an echocardiogram to assess the biventricular function    #2 hypertension hypertensive heart disease    Good blood pressure continue carvedilol continue spironolactone continue isosorbide     #3 paroxysmal atrial fibrillation no further recurrence     Continue carvedilol    [id number] score    Is 4 patient refused oral anticoagulation   alternate methods of anticoagulation such as watchman device and pros recurrently to the procedure discussed with the patient. She  preferred to continue present medications      I spent 20 minutes caring for Dora on this date of service. This time includes time spent by me includes counseling/coordination of care as relates to the presenting problem and any ordered procedures/tests as outlined above.        Diagnoses and all orders for this visit:    1. Essential hypertension (Primary)  -     ECG 12 Lead    2. Paroxysmal atrial fibrillation (HCC)  -     ECG 12 Lead    3. Chronic heart failure with preserved ejection fraction (HCC)    4. Hyperlipidemia, unspecified hyperlipidemia type    5. Left bundle branch block          Lo Jade MD  2/8/2022  11:44 CST

## 2022-02-11 LAB
QT INTERVAL: 448 MS
QTC INTERVAL: 473 MS

## 2022-04-04 ENCOUNTER — OFFICE VISIT (OUTPATIENT)
Dept: NEUROLOGY | Age: 77
End: 2022-04-04
Payer: MEDICARE

## 2022-04-04 VITALS
BODY MASS INDEX: 21.07 KG/M2 | WEIGHT: 139 LBS | SYSTOLIC BLOOD PRESSURE: 146 MMHG | HEART RATE: 76 BPM | HEIGHT: 68 IN | DIASTOLIC BLOOD PRESSURE: 78 MMHG

## 2022-04-04 DIAGNOSIS — G20 PARKINSON DISEASE (HCC): Primary | ICD-10-CM

## 2022-04-04 DIAGNOSIS — Z86.39 HISTORY OF HYPERLIPIDEMIA: ICD-10-CM

## 2022-04-04 DIAGNOSIS — Z86.79 HISTORY OF HYPERTENSION: ICD-10-CM

## 2022-04-04 PROCEDURE — 99214 OFFICE O/P EST MOD 30 MIN: CPT | Performed by: PSYCHIATRY & NEUROLOGY

## 2022-04-04 RX ORDER — ACETAMINOPHEN 500 MG
500 TABLET ORAL EVERY 6 HOURS PRN
COMMUNITY
End: 2022-10-10

## 2022-04-04 NOTE — PROGRESS NOTES
Chief Complaint   Patient presents with    Tremors     follow up, pt states she fell on Saturday and was taken to [facility] ED        Sushma Marr is a 67y.o. year old female who is seen for evaluation of her tremor. She has about a 2 year history of tremor in the left hand and it is occasional noticed in her chin. She has decreased arm swing on the left but does have a history of an injury there remotely. There is no family history of tremor. The patient had a liver transplant in 2003 there is been no changes in her medications for several years. She denies any trouble with her gait per se. Otherwise any focal neurological difficulties. Old records are reviewed. When I saw her 11/19  I felt that she had Parkinson's disease. She was started on Sinemet . Currently taking 2 pills in the morning, after lunch and in the evening and doing well except for some falls at times. .  Overall doing very well with her tremors. She has hypertension and hyperlipidemia and those are controlled. She has worsening arthritis in her right knee which he has her difficulty getting into the standing position. She has been having occasional falls. She fell last weekend in her house. She has a lot of bruising. No loss of consciousness. Sinemet dose is 2 pills 3 times a day. Active Ambulatory Problems     Diagnosis Date Noted    No Active Ambulatory Problems     Resolved Ambulatory Problems     Diagnosis Date Noted    No Resolved Ambulatory Problems     Past Medical History:   Diagnosis Date    Congenital heart disease     Hyperlipidemia     Liver transplanted [facility])        Past Surgical History:   Procedure Laterality Date    BACK SURGERY      x 10    BLEPHAROPLASTY Bilateral 11/27/2020    HYSTERECTOMY      LIVER TRANSPLANT      SHOULDER SURGERY         History reviewed. No pertinent family history.     Allergies   Allergen Reactions    Codeine     Hydrocodone-Acetaminophen     Penicillins        Social History     Socioeconomic History    Marital status:      Spouse name: Not on file    Number of children: Not on file    Years of education: Not on file    Highest education level: Not on file   Occupational History    Not on file   Tobacco Use    Smoking status: Never Smoker    Smokeless tobacco: Never Used   Vaping Use    Vaping Use: Never used   Substance and Sexual Activity    Alcohol use: No    Drug use: No    Sexual activity: Not on file   Other Topics Concern    Not on file   Social History Narrative    Not on file     Social Determinants of Health     Financial Resource Strain:     Difficulty of Paying Living Expenses: Not on file   Food Insecurity:     Worried About [address] in the Last Year: Not on file    Yue of Food in the Last Year: Not on file   Transportation Needs:     Lack of Transportation (Medical): Not on file    Lack of Transportation (Non-Medical): Not on file   Physical Activity:     Days of Exercise per Week: Not on file    Minutes of Exercise per Session: Not on file   Stress:     Feeling of Stress : Not on file   Social Connections:     Frequency of Communication with Friends and Family: Not on file    Frequency of Social Gatherings with Friends and Family: Not on file    Attends [employer] Services: Not on file    Active Member of Clubs or Organizations: Not on file    Attends Club or Organization Meetings: Not on file    Marital Status: Not on file   Intimate Partner Violence:     Fear of Current or Ex-Partner: Not on file    Emotionally Abused: Not on file    Physically Abused: Not on file    Sexually Abused: Not on file   Housing Stability:     Unable to Pay for Housing in the Last Year: Not on file    Number of [place] in the Last Year: Not on file    Unstable Housing in the Last Year: Not on file       Review of Systems      Constitutional - No fever or chills. No diaphoresis or significant fatigue.   HENT -  No tinnitus or significant hearing loss. Eyes - no sudden vision change or eye pain  Respiratory - no significant shortness of breath or cough  Cardiovascular - no chest pain No palpitations or significant leg swelling  Gastrointestinal - no abdominal swelling or pain. Genitourinary - No difficulty urinating, dysuria  Musculoskeletal - no back pain or myalgia. Skin - no color change or rash  Neurologic - No seizures. No lateralizing weakness. Hematologic - yes easy bruising or excessive bleeding. Psychiatric - no severe anxiety or nervousness. All other review of systems are negative.          Current Outpatient Medications   Medication Sig Dispense Refill    acetaminophen (TYLENOL) 500 MG tablet Take 500 mg by mouth every 6 hours as needed for Pain      carbidopa-levodopa (SINEMET)  MG per tablet Take 2 pills 4 times daily 240 tablet 5    JANUVIA 25 MG tablet Take 1 tablet by mouth every morning      allopurinol (ZYLOPRIM) 100 MG tablet Take 1 tablet by mouth 3 times daily  5    carvedilol (COREG) 12.5 MG tablet Take 12.5 mg by mouth 2 times daily (with meals)       isosorbide mononitrate (IMDUR) 120 MG extended release tablet Take 120 mg by mouth daily       sirolimus (RAPAMUNE) 1 MG tablet Take 1 mg by mouth daily       spironolactone (ALDACTONE) 25 MG tablet Take 25 mg by mouth daily       furosemide (LASIX) 40 MG tablet Take 40 mg by mouth 2 times daily      aspirin 81 MG tablet Take 81 mg by mouth daily      atorvastatin (LIPITOR) 20 MG tablet Take 20 mg by mouth daily      magnesium gluconate (MAGONATE) 500 MG tablet Take 500 mg by mouth daily       traMADol (ULTRAM) 50 MG tablet Take 50 mg by mouth every 6 hours as needed for Pain (Patient not taking: Reported on 4/4/2022)       No current facility-administered medications for this visit. BP (!) 146/78   Pulse 76   Ht 5' 7.6\" (1.717 m)   Wt 139 lb (63 kg)   BMI 21.39 kg/m²       Constitutional - well developed, well nourished. Eyes - conjunctiva normal.  Pupils react to light  Ear, nose, throat -hearing intact to finger rub No scars, masses, or lesions over external nose or ears, no atrophy of tongue  Neck-symmetric, no masses noted, no jugular vein distension. No bruits noted. Respiration- chest wall appears symmetric, good expansion,   normal effort without use of accessory muscles  Cardiovascular- RRR  Musculoskeletal - no significant wasting of muscles noted, no bony deformities, gait no gross ataxia. Decreased range of motion of the left shoulder  Extremities-no clubbing, cyanosis or edema  Skin - warm, dry, and intact. No rash, erythema, or pallor. Psychiatric - mood, affect, and behavior appear normal.      Neurological exam  Awake, alert, fluent oriented x 3 appropriate affect  Attention and concentration appear appropriate  Recent and remote memory appears unremarkable  Speech normal without dysarthria  No clear issues with language of fund of knowledge    Cranial Nerve Exam   CN II- Visual fields grossly unremarkable. VA adequate. PERRLA. CN III, IV,VI-EOMI, No nystagmus, conjugate eye movements, no ptosis  CN VII-no facial asymmetry. Masked face  CN VIII-Hearing intact to finger rub  CN IX and X- Palate elevates in midline  CN XI-good shoulder shrug  CN XII-Tongue midline with no fasciculations or fibrillations    Motor Exam  V/V throughout upper and lower extremities bilaterally, no cogwheeling, normal tone      Reflexes   Trace  throughout    Tremors- Occasional resting tremor in the left hand. Chin tremor is noted distraction. There is cogwheeling and bradykinesia in the left upper extremity. Gait  Antalgic. Decreased arm swing. Some freezing noted. thoracic kyphosis  Coordination  Finger to nose-unremarkable          Assessment    ICD-10-CM    1. Parkinson disease (HCC)  G20 carbidopa-levodopa (SINEMET)  MG per tablet   2. History of hypertension  Z86.79    3.  History of hyperlipidemia  Z86.39        She will increase her Sinemet slowly to 2 pills 4 times a day. Continue with aggressive exercise. Hypertension hyperlipidemia are treated and stable. Plan    Return in about 3 months (around 7/4/2022).     (Please note that portions of this note were completed with a voice recognition program. Efforts were made to edit the dictations but occasionally words are mis-transcribed.)

## 2022-04-04 NOTE — PROGRESS NOTES

## 2022-05-12 ENCOUNTER — HOME HEALTH ADMISSION (OUTPATIENT)
Dept: HOME HEALTH SERVICES | Facility: HOME HEALTHCARE | Age: 77
End: 2022-05-12

## 2022-07-07 ENCOUNTER — OFFICE VISIT (OUTPATIENT)
Dept: NEUROLOGY | Age: 77
End: 2022-07-07
Payer: MEDICARE

## 2022-07-07 VITALS
HEIGHT: 67 IN | OXYGEN SATURATION: 98 % | DIASTOLIC BLOOD PRESSURE: 62 MMHG | HEART RATE: 103 BPM | SYSTOLIC BLOOD PRESSURE: 107 MMHG | WEIGHT: 130 LBS | BODY MASS INDEX: 20.4 KG/M2

## 2022-07-07 DIAGNOSIS — G20 PARKINSON DISEASE (HCC): Primary | ICD-10-CM

## 2022-07-07 DIAGNOSIS — Z86.79 HISTORY OF HYPERTENSION: ICD-10-CM

## 2022-07-07 DIAGNOSIS — Z86.39 HISTORY OF HYPERLIPIDEMIA: ICD-10-CM

## 2022-07-07 PROCEDURE — 1123F ACP DISCUSS/DSCN MKR DOCD: CPT | Performed by: PSYCHIATRY & NEUROLOGY

## 2022-07-07 PROCEDURE — 99213 OFFICE O/P EST LOW 20 MIN: CPT | Performed by: PSYCHIATRY & NEUROLOGY

## 2022-07-07 RX ORDER — APIXABAN 5 MG/1
TABLET, FILM COATED ORAL
COMMUNITY
Start: 2022-04-19 | End: 2022-10-10

## 2022-07-07 RX ORDER — SIROLIMUS 0.5 MG/1
0.5 TABLET, FILM COATED ORAL DAILY
COMMUNITY

## 2022-07-07 NOTE — PROGRESS NOTES
REVIEW OF SYSTEMS    Constitutional: []Fever []Sweat []Chills [] Recent Injury [x] Denies all unless marked  HEENT:[]Headache  [] Head Injury/Hearing Loss  [] Sore Throat  [] Ear Ache/Dizziness  [x] Denies all unless marked  Spine:  [] Neck pain  [] Back pain  [] Sciaticia  [x] Denies all unless marked  Cardiovascular:[]Heart Disease []Chest Pain [] Palpitations  [x] Denies all unless marked  Pulmonary: []Shortness of Breath []Cough   [x] Denies all unless marke  Gastrointestinal: []Nausea  []Vomiting  []Abdominal Pain  []Constipation  []Diarrhea  []Dark Bloody Stools  [x] Denies all unless marked  Psychiatric/Behavioral:[] Depression [] Anxiety [x] Denies all unless marked  Genitourinary:   [] Frequency  [] Urgency  [] Incontinence [] Pain with Urination  [x] Denies all unless marked  Extremities: []Pain  []Swelling  [x] Denies all unless marked  Musculoskeletal: [] Muscle Pain  [] Joint Pain  [] Arthritis [] Muscle Cramps [x] Muscle Twitches  [x] Denies all unless marked  Sleep: [] Insomnia [] Snoring [] Restless Legs [] Sleep Apnea  [] Daytime Sleepiness  [x] Denies all unless marked  Skin:[] Rash [] Skin Discoloration [x] Denies all unless marked   Neurological: []Visual Disturbance/Memory Loss [x] Loss of Balance [] Slurred Speech/Weakness [] Seizures  [] Vertigo/Dizziness [x] Denies all unless marked

## 2022-07-07 NOTE — PROGRESS NOTES
Chief Complaint   Patient presents with   Ria Bahman is a 67y.o. year old female who is seen for evaluation of her tremor. She has about a 2 year history of tremor in the left hand and it is occasional noticed in her chin. She has decreased arm swing on the left but does have a history of an injury there remotely. There is no family history of tremor. The patient had a liver transplant in 2003 there has been no changes in her medications for several years. She denies any trouble with her gait per se. Otherwise any focal neurological difficulties. Old records are reviewed. When I saw her 11/19  I felt that she had Parkinson's disease. She was started on Sinemet . Overall doing very well with her tremors. She has hypertension and hyperlipidemia and those are controlled. She has worsening arthritis in her right knee which he has her difficulty getting into the standing position. She has been having occasional falls. No loss of consciousness. On her last visit 3 months ago her Sinemet was increased from 2 pills 3 times a day to 4 times a day. Frequently she does not take the last dose. She has had at least 1 major falls since here last and fractured her wrist.  Active Ambulatory Problems     Diagnosis Date Noted    No Active Ambulatory Problems     Resolved Ambulatory Problems     Diagnosis Date Noted    No Resolved Ambulatory Problems     Past Medical History:   Diagnosis Date    Congenital heart disease     Hyperlipidemia     [facility])        Past Surgical History:   Procedure Laterality Date    BACK SURGERY      x 10    BLEPHAROPLASTY Bilateral 11/27/2020    HYSTERECTOMY      LIVER TRANSPLANT      SHOULDER SURGERY         No family history on file.     Allergies   Allergen Reactions    Codeine     Hydrocodone-Acetaminophen     Penicillins        Social History     Socioeconomic History    Marital status:      Spouse name: Not on file    Number of children: Not on file    Years of education: Not on file    Highest education level: Not on file   Occupational History    Not on file   Tobacco Use    Smoking status: Never Smoker    Smokeless tobacco: Never Used   Vaping Use    Vaping Use: Never used   Substance and Sexual Activity    Alcohol use: No    Drug use: No    Sexual activity: Not on file   Other Topics Concern    Not on file   Social History Narrative    Not on file     Social Determinants of Health     Financial Resource Strain:     Difficulty of Paying Living Expenses: Not on file   Food Insecurity:     Worried About Running Out of Food in the Last Year: Not on file    Yue of Food in the Last Year: Not on file   Transportation Needs:     Lack of Transportation (Medical): Not on file    Lack of Transportation (Non-Medical): Not on file   Physical Activity:     Days of Exercise per Week: Not on file    Minutes of Exercise per Session: Not on file   Stress:     Feeling of Stress : Not on file   Social Connections:     Frequency of Communication with Friends and Family: Not on file    Frequency of Social Gatherings with Friends and Family: Not on file    Attends Jainism Services: Not on file    Active Member of [address] [place] [address] [employer] or Organizations: Not on file    Attends Club or Organization Meetings: Not on file    Marital Status: Not on file   Intimate Partner Violence:     Fear of Current or Ex-Partner: Not on file    Emotionally Abused: Not on file    Physically Abused: Not on file    Sexually Abused: Not on file   Housing Stability:     Unable to Pay for Housing in the Last Year: Not on file    Number of [place] in the Last Year: Not on file    Unstable Housing in the Last Year: Not on file       Review of Systems        Constitutional: []? Fever []? Sweat []? Chills []? Recent Injury [x]? Denies all unless marked  HEENT:[]? Headache  []? Head Injury/Hearing Loss  []? Sore Throat  []? Ear Ache/Dizziness  [x]?  Denies all unless marked  Spine:  []? Neck pain  []? Back pain  []? Sciaticia  [x]? Denies all unless marked  Cardiovascular:[]? Heart Disease []? Chest Pain []? Palpitations  [x]? Denies all unless marked  Pulmonary: []? Shortness of Breath []? Cough   [x]? Denies all unless marke  Gastrointestinal: []? Nausea  []? Vomiting  []? Abdominal Pain  []? Constipation  []? Diarrhea  []? Dark Bloody Stools  [x]? Denies all unless marked  Psychiatric/Behavioral:[]? Depression []? Anxiety [x]? Denies all unless marked  Genitourinary:   []? Frequency  []? Urgency  []? Incontinence []? Pain with Urination  [x]? Denies all unless marked  Extremities: []? Pain  []? Swelling  [x]? Denies all unless marked  Musculoskeletal: []? Muscle Pain  []? Joint Pain  []? Arthritis []? Muscle Cramps [x]? Muscle Twitches  [x]? Denies all unless marked  Sleep: []? Insomnia []? Snoring []? Restless Legs []? Sleep Apnea  []? Daytime Sleepiness  [x]? Denies all unless marked  Skin:[]? Rash []? Skin Discoloration [x]? Denies all unless marked   Neurological: []? Visual Disturbance/Memory Loss [x]? Loss of Balance []? Slurred Speech/Weakness []? Seizures  []? Vertigo/Dizziness [x]?  Denies all unless marked                             Current Outpatient Medications   Medication Sig Dispense Refill    ELIQUIS 5 MG TABS tablet       sirolimus (RAPAMUNE) 0.5 MG TABS tablet Take 0.5 mg by mouth daily      carbidopa-levodopa (SINEMET)  MG per tablet Take 2 pills 4 times daily 240 tablet 5    JANUVIA 25 MG tablet Take 1 tablet by mouth every morning      allopurinol (ZYLOPRIM) 100 MG tablet Take 1 tablet by mouth 3 times daily  5    carvedilol (COREG) 12.5 MG tablet Take 12.5 mg by mouth 2 times daily (with meals)       isosorbide mononitrate (IMDUR) 120 MG extended release tablet Take 120 mg by mouth daily       spironolactone (ALDACTONE) 25 MG tablet Take 25 mg by mouth daily       furosemide (LASIX) 40 MG tablet Take 40 mg by mouth 2 times daily      aspirin 81 MG tablet Take 81 mg by mouth daily      atorvastatin (LIPITOR) 20 MG tablet Take 20 mg by mouth daily      magnesium gluconate (MAGONATE) 500 MG tablet Take 500 mg by mouth daily       traMADol (ULTRAM) 50 MG tablet Take 50 mg by mouth every 6 hours as needed for Pain.  acetaminophen (TYLENOL) 500 MG tablet Take 500 mg by mouth every 6 hours as needed for Pain (Patient not taking: Reported on 7/7/2022)      sirolimus (RAPAMUNE) 1 MG tablet Take 1 mg by mouth daily  (Patient not taking: Reported on 7/7/2022)       No current facility-administered medications for this visit. /62   Pulse (!) 103   Ht 5' 7\" (1.702 m)   Wt 130 lb (59 kg)   SpO2 98%   BMI 20.36 kg/m²       Constitutional - well developed, well nourished. Eyes - conjunctiva normal.  Pupils react to light  Ear, nose, throat -hearing intact to finger rub No scars, masses, or lesions over external nose or ears, no atrophy of tongue  Neck-symmetric, no masses noted, no jugular vein distension. No bruits noted. Respiration- chest wall appears symmetric, good expansion,   normal effort without use of accessory muscles  Cardiovascular- RRR  Musculoskeletal - no significant wasting of muscles noted, no bony deformities, gait no gross ataxia. Decreased range of motion of the left shoulder  Extremities-no clubbing, cyanosis or edema  Skin - warm, dry, and intact. No rash, erythema, or pallor. Psychiatric - mood, affect, and behavior appear normal.      Neurological exam  Awake, alert, fluent oriented x 3 appropriate affect  Attention and concentration appear appropriate  Recent and remote memory appears unremarkable  Speech normal without dysarthria  No clear issues with language of fund of knowledge    Cranial Nerve Exam   CN II- Visual fields grossly unremarkable. VA adequate. PERRLA. CN III, IV,VI-EOMI, No nystagmus, conjugate eye movements, no ptosis  CN VII-no facial asymmetry.  Masked face  CN VIII-Hearing intact to finger rub  CN IX and X- Palate elevates in midline  CN XI-good shoulder shrug  CN XII-Tongue midline with no fasciculations or fibrillations    Motor Exam  V/V throughout upper and lower extremities bilaterally, no cogwheeling, normal tone      Reflexes   Trace  throughout    Tremors- Occasional resting tremor in the left hand. Chin tremor is noted distraction. There is cogwheeling and bradykinesia in the left upper extremity. Gait  Antalgic. Decreased arm swing. Some freezing noted. thoracic kyphosis. Using a walker  Coordination  Finger to nose-unremarkable          Assessment    ICD-10-CM    1. Parkinson disease ([address]  G20    2. History of hypertension  Z86.79    3. History of hyperlipidemia  Z86.39      I would like her to take her Sinemet 2 pills 4 times a day. She will take this at approximately 8 AM, noon, 4 PM and 8 PM.  Call for difficulties. Hypertension hyperlipidemia are treated and stable. Plan    Return in about 3 months (around 10/7/2022).     (Please note that portions of this note were completed with a voice recognition program. Efforts were made to edit the dictations but occasionally words are mis-transcribed.)

## 2022-07-19 ENCOUNTER — TELEPHONE (OUTPATIENT)
Dept: CARDIOLOGY | Facility: CLINIC | Age: 77
End: 2022-07-19

## 2022-07-19 NOTE — TELEPHONE ENCOUNTER
Contacted patient verified name and . I was able to schedule the patient a appointment for 22 at 3:00pm with Steph    Patient voice understanding    ----- Message from Kendall Epley, MA sent at 2022  4:35 PM CDT -----  Contact: [phone]  Shaista girl will you find her a spot and call her to let her know when?     ----- Message -----  From: Steph Cuello, ROZ  Sent: 2022   4:32 PM CDT  To: Lo Jade MD, Kendall Epley, MA    Ok Schedule her  ----- Message -----  From: Lo Jade MD  Sent: 2022   1:53 PM CDT  To: ROZ Cannon    Please see in your chf clinic.    ----- Message -----  From: Epley, Kendall, MA  Sent: 2022  11:17 AM CDT  To: Lo Jade MD    Does she need to follow up with you or CHF clinic  ----- Message -----  From: Pati Mendez  Sent: 2022  11:00 AM CDT  To: Kendall Epley, MA    She was discharged from [facility] this past Saturday. She had a lot of fluid drained off and had an echo done. They told her to make a f/u in 1 week with Cardiology.  I told her to keep appt on  and if Dr Jade feels like she needs to be seen sooner than we would call her.    Care everywhere probably needs to be updated

## 2022-08-01 ENCOUNTER — OFFICE VISIT (OUTPATIENT)
Dept: CARDIOLOGY | Facility: CLINIC | Age: 77
End: 2022-08-01

## 2022-08-01 VITALS
OXYGEN SATURATION: 99 % | DIASTOLIC BLOOD PRESSURE: 70 MMHG | BODY MASS INDEX: 23.05 KG/M2 | HEART RATE: 77 BPM | WEIGHT: 135 LBS | HEIGHT: 64 IN | SYSTOLIC BLOOD PRESSURE: 102 MMHG

## 2022-08-01 DIAGNOSIS — I50.32 CHRONIC HEART FAILURE WITH PRESERVED EJECTION FRACTION: Primary | ICD-10-CM

## 2022-08-01 PROCEDURE — 99214 OFFICE O/P EST MOD 30 MIN: CPT | Performed by: NURSE PRACTITIONER

## 2022-08-01 RX ORDER — TACROLIMUS 0.5 MG/1
CAPSULE ORAL
COMMUNITY
End: 2022-08-01

## 2022-08-01 RX ORDER — ACETAMINOPHEN 500 MG
500 TABLET ORAL
COMMUNITY
End: 2022-08-09

## 2022-08-01 RX ORDER — ATORVASTATIN CALCIUM 20 MG/1
TABLET, FILM COATED ORAL
COMMUNITY
Start: 2022-04-25 | End: 2022-08-01 | Stop reason: SDUPTHER

## 2022-08-01 RX ORDER — FERROUS SULFATE 325(65) MG
325 TABLET ORAL
COMMUNITY
Start: 2022-07-16

## 2022-08-01 NOTE — PROGRESS NOTES
Congestive Heart Failure (Chief Complaint)    Nephrology: Dr. Harrison  History of Present Illness  Follows Dr. Edmond Jade, Cardiologist here at [facility] in [place].    Ms. Dora Velazquez is a 76-year-old female with medical history of Parkinson's disease, paroxysmal atrial fibrillation, CKD, history of GI bleed, chronic heart failure with preserved EF, hypertension, hyperlipidemia, left bundle branch block, and history of liver transplant for HCV Cirrhosis.  She was on chronic anticoagulation for atrial fibrillation but it had to be stopped due to GI bleed.    She reports 2-3 weeks of shortness of air and weakness.  Presented to [facility] and wanted to be transferred to [place] but due to bed shortage here locally she decided to go to [facility]  in [place].  This is where her previous liver transplant surgery was done.  She was admitted and kept for CHF/fluid overload.  She normally sleeps on 2 pillows this is not changed for years.  She has longstanding history of hypertension.  She is on long-term immunosuppressant medications as well.      She was discharged from hospital 2 days ago.  She reports that she is feeling a little bit better.    Echo   7/10/2018  Interpretation Summary    · Left atrial cavity size is mildly dilated.  · The left ventricular cavity is mildly dilated.  · Left ventricular systolic function is low normal. Estimated EF = 45%.  · Left ventricular diastolic dysfunction (grade I a) consistent with impaired relaxation.  · Left ventricular wall thickness is consistent with mild concentric hypertrophy.       Past Medical History:   Diagnosis Date   • Arthritis    • Backache    • Bilateral pleural effusion    • Blood in feces     Blood in feces symptom   • Capsulitis    • Cardiomyopathy (HCC)      HFpEF, improved      • Chest pain    • CHF (congestive heart failure) (HCC)    • Chronic anemia    • Chronic hepatitis C (HCC)    • Degenerative joint  disease involving multiple joints    • Dilated cardiomyopathy (HCC)    • Dyslipidemia    • Dyspnea    • Edema of leg    • Epistaxis    • Essential hypertension    • Fibromyalgia, primary    • GERD (gastroesophageal reflux disease)    • H/O endoscopy 06/30/2014    Colon endoscopy 02205   • Headache    • Hemorrhoids     internal & external   • History of liver recipient (HCC)     S/P done at [facility] 2003      • Hyperlipemia    • Hyperlipidemia    • Hypertension    • Hypokalemia    • Left bundle branch block    • Metatarsalgia     Metatarsalgia - with fat pad atrophie      • Pap smear for cervical cancer screening 02/09/1996   • Paroxysmal atrial fibrillation (HCC)    • Polyp of sigmoid colon    • Salmonella arthritis (HCC)    • Transient cerebral ischemia     Unspecified     Past Surgical History:   Procedure Laterality Date   • BACK SURGERY  1995    Back Surgery (2)      • CARDIAC CATHETERIZATION  11/03/2005    Orders Not Yet Performed: 0       • CARPAL TUNNEL RELEASE  02/12/2007    Carpal tunnel surgery (1)     • CAUTERIZATION NASAL BLEEDERS  03/09/2000    Control nose/throat bleeding (1)      • CHOLECYSTECTOMY  2000   • COLONOSCOPY N/A 8/14/2019    Procedure: COLONOSCOPY;  Surgeon: Chaparro Mckeon MD;  Location: [facility] ENDOSCOPY;  Service: Gastroenterology   • LAPAROSCOPIC LYSIS OF ADHESIONS  07/16/1992    Laparoscopy; lysis of adhesions (1)      • LIVER BIOPSY  01/05/1998    Needle biopsy of liver 52031 (1)      • LIVER TRANSPLANTATION  2003    Anesth, for liver transplant (1)      • SALPINGO OOPHORECTOMY  1974    Salpingo-oophorectomy (1)      • SHOULDER SURGERY  08/29/2007    Shoulder surgery procedure (1)      • TOTAL ABDOMINAL HYSTERECTOMY  1968    Total abd hysterectomy (1)        Social History     Socioeconomic History   • Marital status: Single   Tobacco Use   • Smoking status: Never Smoker   • Smokeless tobacco: Never Used   Vaping Use   • Vaping Use: Never used   Substance and Sexual Activity    • Alcohol use: No   • Drug use: No   • Sexual activity: Defer     Family History   Problem Relation Age of Onset   • Heart attack Father    • Stroke Father    • Cancer Other         Other   • Heart disease Other    • Hypertension Other    • Cholelithiasis Other        ALLERGIES:  Allergies   Allergen Reactions   • Lortab [Hydrocodone-Acetaminophen] Other (See Comments)     Can cause problems for liver has had transplant    • Penicillins Anaphylaxis and Hives   • Tape Other (See Comments)     IV tape causes skin tears   • Codeine Hives         Review of Systems   Constitutional: Negative for fever, malaise/fatigue and night sweats.   HENT: Negative for congestion and sore throat.    Eyes: Negative for blurred vision and double vision.   Cardiovascular: Positive for leg swelling. Negative for chest pain, dyspnea on exertion, orthopnea, palpitations and syncope.   Respiratory: Positive for shortness of breath. Negative for cough and wheezing.    Endocrine: Negative for polydipsia, polyphagia and polyuria.   Hematologic/Lymphatic: Negative for adenopathy and bleeding problem.   Musculoskeletal: Negative for muscle weakness, myalgias and neck pain.        Walks with walker   Gastrointestinal: Negative for bloating, abdominal pain, nausea and vomiting.   Genitourinary: Negative for dysuria and hematuria.   Neurological: Negative for difficulty with concentration, dizziness, headaches and light-headedness.       Current Outpatient Medications   Medication Sig Dispense Refill   • apixaban (Eliquis) 5 MG tablet tablet Eliquis 5 mg tablet     • acetaminophen (TYLENOL) 500 MG tablet Take 500 mg by mouth.     • allopurinol (ZYLOPRIM) 100 MG tablet Take 100 mg by mouth 3 (Three) Times a Day.     • aspirin 81 MG tablet Take 81 mg by mouth daily.     • atorvastatin (LIPITOR) 20 MG tablet      • atorvastatin (LIPITOR) 40 MG tablet Take 40 mg by mouth every night.     • carbidopa-levodopa (SINEMET)  MG per tablet take 1 tab  three times daily     • carvedilol (COREG) 25 MG tablet Take 25 mg by mouth 2 (two) times a day.     • FeroSul 325 (65 Fe) MG tablet      • furosemide (LASIX) 40 MG tablet Take 40 mg by mouth daily.     • isosorbide mononitrate (IMDUR) 120 MG 24 hr tablet Take 120 mg by mouth daily.     • Januvia 25 MG tablet      • magnesium gluconate (MAGONATE) 500 MG tablet Take  by mouth.     • sirolimus (RAPAMUNE) 0.5 MG tablet tablet Take 1 mg by mouth Daily.     • spironolactone (ALDACTONE) 50 MG tablet Take 50 mg by mouth every morning.     • tacrolimus (PROGRAF) 0.5 MG capsule tacrolimus 0.5 mg capsule 0.5 mg capsule      active     • traMADol (ULTRAM) 50 MG tablet Take  by mouth Every 6 (Six) Hours.       No current facility-administered medications for this visit.       OBJECTIVE:    Physical Exam:   Vitals reviewed.   Constitutional:       Appearance: Well-groomed, normal weight and not in distress.   Eyes:      General: Lids are normal.      Conjunctiva/sclera: Conjunctivae normal.      Right eye: Right conjunctiva is not injected.      Left eye: Left conjunctiva is not injected.      Pupils: Pupils are equal, round, and reactive to light.   HENT:      Head: Normocephalic and atraumatic.   Neck:      Thyroid: No thyromegaly.      Vascular: No JVD. JVD normal.      Trachea: Trachea normal.   Pulmonary:      Effort: Pulmonary effort is normal.      Breath sounds: Normal breath sounds and air entry. No wheezing. No rhonchi.   Cardiovascular:      PMI at left midclavicular line. Normal rate. Regular rhythm. Normal S1. Normal S2.      Murmurs: There is no murmur.      No gallop.   Edema:     Peripheral edema absent.   Abdominal:      General: Bowel sounds are normal. There is no distension.      Palpations: Abdomen is soft.      Tenderness: There is no abdominal tenderness.   Skin:     General: Skin is warm and dry.      Coloration: Skin is not jaundiced.   Neurological:      Mental Status: Alert and oriented to person,  "place, and time.   Psychiatric:         Attention and Perception: Attention normal.         Speech: Speech normal.         Behavior: Behavior is cooperative.       Vitals:    08/01/22 1516   Weight: 61.2 kg (135 lb)   Height: 162.6 cm (64.02\")       DATA REVIEWED:   Results for orders placed during the hospital encounter of 07/10/18    Adult Transthoracic Echo Complete W/ Cont if Necessary Per Protocol    Interpretation Summary  · Left atrial cavity size is mildly dilated.  · The left ventricular cavity is mildly dilated.  · Left ventricular systolic function is low normal. Estimated EF = 45%.  · Left ventricular diastolic dysfunction (grade I a) consistent with impaired relaxation.  · Left ventricular wall thickness is consistent with mild concentric hypertrophy.      No radiology results for the last 30 days.  CXR:     CT:     Labs: BMP, CBC, LIPID, TSH  Lab Results   Component Value Date    GLUCOSE 159 (H) 07/10/2019    CALCIUM 9.5 07/10/2019     07/10/2019    K 4.3 07/10/2019    CO2 24.0 07/10/2019    CL 97 (L) 07/10/2019    BUN 42 (H) 07/10/2019    CREATININE 1.71 (H) 07/10/2019    EGFRIFNONA 29 (L) 07/10/2019    BCR 24.6 07/10/2019    ANIONGAP 17.0 (H) 07/10/2019     Lab Results   Component Value Date    WBC 8.69 07/10/2019    HGB 14.3 07/10/2019    HCT 43.1 07/10/2019    MCV 92.5 07/10/2019     07/10/2019     No results found for: CHOL  No results found for: TRIG  No results found for: HDL  No components found for: LDLCALC  No results found for: LDL  No results found for: HDLLDLRATIO  No components found for: CHOLHDL  No results found for: TSH  No results found for: PROBNP  EKG:       The following portions of the patient's history were reviewed and updated as appropriate: allergies, current medications, past family history, past medical history, past social history, past surgical history and problem list.  Old records reviewed and pertinent information is included in the above objective data. "     ASSESSMENT/PLAN:       Diagnosis Plan   1. Chronic heart failure with preserved ejection fraction (HCC)          1.  Chronic heart failure with mildly reduced EF. An Echo was ordered back in February. She needs to complete this as we need an updated study to assess heart function. We discussed this at her visit today.    NICM: EF: 45%. NYHA Class II, Stage C. Patient appears euvolemic. and in a well perfused physiologic state. Hemodynamics are acceptable    BETA-BLOCKER: Carvedilol 25 mg twice daily  ACE/ARB: Cant take due to immunosuppressive medication  ENTRESTO: not indicated  DIURETIC: Lasix 40 mg daily  SGL2I: not indicated  ALDOSTERONE ANTAGONIST: Spironolactone 50 mg daily  IMDUR/HYDRALAZINE: Imdur 120 mg daily  DIGOXIN: N/A    Fluid restriction: 2 L  Sodium restriction:2 grams  6MWT: not able to do,using walker    Cardiac Rehab: not indicated  ICD: not indicated   ADHF: 7/2022  CardioMEMS:not indicated  LVAD: not indicated    She hold me that she has not been taking Eliquis for a long time.  Went over her medication list and reconciled them prior to end of office visit.       I spent 39 minutes caring for Dora on this date of service. This time includes time spent by me in the following activities: preparing for the visit, reviewing tests, obtaining and/or reviewing a separately obtained history, performing a medically appropriate examination and/or evaluation, counseling and educating the patient/family/caregiver, referring and communicating with other health care professionals, documenting information in the medical record and independently interpreting results and communicating that information with the patient/family/caregiver  Return in about 3 months (around 11/1/2022) for Recheck.          This document has been electronically signed by ROZ Cannon on August 1, 2022 15:26 CDT   Electronically signed by ROZ Cannon, 08/01/22, 3:25 PM CDT.

## 2022-08-08 ENCOUNTER — TELEPHONE (OUTPATIENT)
Dept: CARDIOLOGY | Facility: CLINIC | Age: 77
End: 2022-08-08

## 2022-08-08 NOTE — TELEPHONE ENCOUNTER
Contacted patient verified name and .     Patient is NOT taking the eliquis.    I told patient we was just clarifying. Patient voiced understanding      ----- Message from ROZ Cannon sent at 2022  3:15 PM CDT -----  Will one of you call her and ask if she is taking her eliquis?     I have that I discontinued this when I saw her on 2022. It was on the AVS to stop taking and I can't remember what she told me    I just want to make sure,

## 2022-08-08 NOTE — TELEPHONE ENCOUNTER
Contacted pt per Steph Bravo about if pt was taking eliquis, pt voiced she was not taking it and that [place] stopped it.       ----- Message from ROZ Cannon sent at 8/7/2022  3:15 PM CDT -----  Will one of you call her and ask if she is taking her eliquis?     I have that I discontinued this when I saw her on 8/1/2022. It was on the AVS to stop taking and I can't remember what she told me    I just want to make sure,

## 2022-08-09 ENCOUNTER — OFFICE VISIT (OUTPATIENT)
Dept: CARDIOLOGY | Facility: CLINIC | Age: 77
End: 2022-08-09

## 2022-08-09 VITALS
HEART RATE: 72 BPM | DIASTOLIC BLOOD PRESSURE: 62 MMHG | WEIGHT: 135 LBS | OXYGEN SATURATION: 98 % | HEIGHT: 64 IN | BODY MASS INDEX: 23.05 KG/M2 | SYSTOLIC BLOOD PRESSURE: 118 MMHG

## 2022-08-09 DIAGNOSIS — I10 ESSENTIAL HYPERTENSION: Primary | ICD-10-CM

## 2022-08-09 DIAGNOSIS — I48.0 PAROXYSMAL ATRIAL FIBRILLATION: ICD-10-CM

## 2022-08-09 DIAGNOSIS — E78.2 MIXED HYPERLIPIDEMIA: ICD-10-CM

## 2022-08-09 DIAGNOSIS — I50.32 CHRONIC HEART FAILURE WITH PRESERVED EJECTION FRACTION: ICD-10-CM

## 2022-08-09 PROCEDURE — 99214 OFFICE O/P EST MOD 30 MIN: CPT | Performed by: INTERNAL MEDICINE

## 2022-08-09 PROCEDURE — 93000 ELECTROCARDIOGRAM COMPLETE: CPT | Performed by: INTERNAL MEDICINE

## 2022-08-09 NOTE — PROGRESS NOTES
Dora Velazquez  77 y.o. female    8/9/2022     1. Essential hypertension    2. Chronic heart failure with preserved ejection fraction (HCC)    3. Paroxysmal atrial fibrillation (HCC)    4. Mixed hyperlipidemia        History of Present Illness:    Patient's Body mass index is 23.17 kg/m². BMI is within normal parameters. No follow-up required.   .  77 years old patient who evaluated with 2 weeks ago at [facility] with significant shortness of breath noted to have severe anemia and second GI bleed was on Eliquis discontinued she was on gout medication per patient that was stopped and she is back to the baseline heart past medical conditions significant for diastolic dysfunction, paroxysmal atrial fibrillation, Parkinson disease, liver transplant done remarkably well from that point of view.  Patient is back to the baseline EKG in the office sinus rhythm had echocardiogram at [place] per patient told everything is fine       ECHO 7/11/2018  ·  Left atrial cavity size is mildly dilated.  · The left ventricular cavity is mildly dilated.  · Left ventricular systolic function is low normal. Estimated EF = 45%.  · Left ventricular diastolic dysfunction (grade I a) consistent with impaired relaxation.  Left ventricular wall thickness is consistent with mild concentric hypertrophy      ECHO 2014  1. Depressed left ventricular systolic function with ejection fraction        of 30% to 35%.  2. Mild concentric left ventricular hypertrophy  3. Grade 1 diastolic dysfunction of the left ventricular myocardium      SUBJECTIVE:    Allergies   Allergen Reactions   • Lortab [Hydrocodone-Acetaminophen] Other (See Comments)     Can cause problems for liver has had transplant    • Penicillins Anaphylaxis and Hives   • Tape Other (See Comments)     IV tape causes skin tears   • Codeine Hives         Past Medical History:   Diagnosis Date   • Arthritis    • Backache    • Bilateral pleural effusion    • Blood in feces      Blood in feces symptom   • Capsulitis    • Cardiomyopathy (HCC)      HFpEF, improved      • Chest pain    • CHF (congestive heart failure) (HCC)    • Chronic anemia    • Chronic hepatitis C (HCC)    • Degenerative joint disease involving multiple joints    • Dilated cardiomyopathy (HCC)    • Dyslipidemia    • Dyspnea    • Edema of leg    • Epistaxis    • Essential hypertension    • Fibromyalgia, primary    • GERD (gastroesophageal reflux disease)    • H/O endoscopy 06/30/2014    Colon endoscopy 40954   • Headache    • Hemorrhoids     internal & external   • History of liver recipient (HCC)     S/P done at [facility] 2003      • Hyperlipemia    • Hyperlipidemia    • Hypertension    • Hypokalemia    • Left bundle branch block    • Metatarsalgia     Metatarsalgia - with fat pad atrophie      • Pap smear for cervical cancer screening 02/09/1996   • Paroxysmal atrial fibrillation (HCC)    • Polyp of sigmoid colon    • Salmonella arthritis (HCC)    • Transient cerebral ischemia     Unspecified         Past Surgical History:   Procedure Laterality Date   • BACK SURGERY  1995    Back Surgery (2)      • CARDIAC CATHETERIZATION  11/03/2005    Orders Not Yet Performed: 0       • CARPAL TUNNEL RELEASE  02/12/2007    Carpal tunnel surgery (1)     • CAUTERIZATION NASAL BLEEDERS  03/09/2000    Control nose/throat bleeding (1)      • CHOLECYSTECTOMY  2000   • COLONOSCOPY N/A 8/14/2019    Procedure: COLONOSCOPY;  Surgeon: Chaparro Mckeon MD;  Location: [facility] ENDOSCOPY;  Service: Gastroenterology   • LAPAROSCOPIC LYSIS OF ADHESIONS  07/16/1992    Laparoscopy; lysis of adhesions (1)      • LIVER BIOPSY  01/05/1998    Needle biopsy of liver 07378 (1)      • LIVER TRANSPLANTATION  2003    Anesth, for liver transplant (1)      • SALPINGO OOPHORECTOMY  1974    Salpingo-oophorectomy (1)      • SHOULDER SURGERY  08/29/2007    Shoulder surgery procedure (1)      • TOTAL ABDOMINAL HYSTERECTOMY  1968    Total abd hysterectomy (1)             Family History   Problem Relation Age of Onset   • Heart attack Father    • Stroke Father    • Cancer Other         Other   • Heart disease Other    • Hypertension Other    • Cholelithiasis Other          Social History     Socioeconomic History   • Marital status: Single   Tobacco Use   • Smoking status: Never Smoker   • Smokeless tobacco: Never Used   Vaping Use   • Vaping Use: Never used   Substance and Sexual Activity   • Alcohol use: No   • Drug use: No   • Sexual activity: Defer         Current Outpatient Medications   Medication Sig Dispense Refill   • aspirin 81 MG tablet Take 81 mg by mouth daily.     • atorvastatin (LIPITOR) 40 MG tablet Take 20 mg by mouth Every Night.     • carbidopa-levodopa (SINEMET)  MG per tablet take 1 tab three times daily     • carvedilol (COREG) 25 MG tablet Take 12.5 mg by mouth 2 (Two) Times a Day.     • FeroSul 325 (65 Fe) MG tablet      • furosemide (LASIX) 40 MG tablet Take 20 mg by mouth 2 (Two) Times a Day.     • isosorbide mononitrate (IMDUR) 120 MG 24 hr tablet Take 120 mg by mouth daily.     • Januvia 25 MG tablet      • magnesium gluconate (MAGONATE) 500 MG tablet Take  by mouth.     • sirolimus (RAPAMUNE) 0.5 MG tablet tablet Take 1 mg by mouth Daily.     • spironolactone (ALDACTONE) 50 MG tablet Take 25 mg by mouth Every Morning.       No current facility-administered medications for this visit.           Review of Systems:   Patient recently evaluated 2-week ago at [facility] for increasing shortness of breath noted to have significant anemia due to GI bleed anticoagulation was discontinued with significant improvement in clinical condition  Constitutional:  Denies recent weight loss, weight gain, no change in exercise tolerance.     HENT:  Denies any hearing loss, epistaxis, hoarseness  Eyes: Wears eyeglasses or contact lenses.    Respiratory:  Denies dyspnea with exertion,no cough    Cardiovascular: See H&P    Gastrointestinal:  "History of GI bleed and liver transplant    Endocrine: Negative for cold intolerance, heat intolerance, polydipsia, polyphagia and polyuria.     Genitourinary: Negative.      Musculoskeletal: History of osteoarthritis    Skin:  Denies   rashes, or skin lesions.     Allergic/Immunologic: Negative.  Negative for environmental allergies, food allergies    Neurological: History of Parkinson disease and tremor    Hematological: Denies any food allergies, seasonal allergies, bleeding disorders    Psychiatric/Behavioral: Denies any history of depression,       OBJECTIVE:    Ht 162.6 cm (64\")   Wt 61.2 kg (135 lb)   LMP 07/18/1971 (Within Months)   BMI 23.17 kg/m²       Physical Exam:     Constitutional: Cooperative, alert and oriented, well-developed, well-nourished, in no acute distress.     HENT:   Head: Normocephalic, conjunctive is pink thyroid is nonpalpable no jugular is distention    Cardiovascular: Regular rhythm, S1 and S2 normal, no S3 or S4. Apical impulse not displaced. No murmurs, gallops, or rubs detected.     Pulmonary/Chest: Chest: Positive bilateral air entry no rales no wheezing    Abdominal: Abdomen soft, bowel sounds normoactive, no masses,    Musculoskeletal: No deformities, positive peripheral pulses no edema    Neurological: No gross motor or sensory deficits noted, affect appropriate, oriented to time, person, place.     Skin: Warm and dry to the touch, no apparent skin lesions or masses noted.     Psychiatric: She has a normal mood and affect. Her behavior is normal. Judgment and thought content normal.         Procedures      Lab Results   Component Value Date    WBC 8.69 07/10/2019    HGB 14.3 07/10/2019    HCT 43.1 07/10/2019    MCV 92.5 07/10/2019     07/10/2019     Lab Results   Component Value Date    GLUCOSE 159 (H) 07/10/2019    BUN 42 (H) 07/10/2019    CREATININE 1.71 (H) 07/10/2019    EGFRIFNONA 29 (L) 07/10/2019    BCR 24.6 07/10/2019    CO2 24.0 07/10/2019    CALCIUM 9.5 " 07/10/2019    ALBUMIN 4.00 07/10/2019    AST 25 07/10/2019    ALT 9 07/10/2019     No results found for: CHOL  No results found for: TRIG  No results found for: HDL  No components found for: LDLCALC  No results found for: LDL  No results found for: HDLLDLRATIO  No components found for: CHOLHDL  No results found for: HGBA1C  No results found for: TSH, D4ZEEYE, U6JWVMD, THYROIDAB        ASSESSMENT AND PLAN:  Congestive heart failure compensated diastolic dysfunction compensated    Compensated no evidence of congestive heart failure she is euvolemic and well perfused continue carvedilol and diuretic  Will arrange an echocardiogram to assess the biventricular function    #2 hypertension hypertensive heart disease    Good blood pressure continue carvedilol continue spironolactone continue isosorbide     #3 paroxysmal atrial fibrillation no further recurrence     Continue carvedilol    [id number] score    Is 4 patient refused oral anticoagulation  Oral anticoagulation was discontinued due to significant GI bleed previously patient refused watchman device placement    History of liver transplant on immunosuppressive therapy followed at [place]    I spent 16 minutes caring for Dora on this date of service. This time includes time spent by me of counseling/coordination of care as relates to the presenting problem and any ordered procedures/tests as outlined above.           This document has been electronically signed by Lo Jade MD on August 9, 2022 11:58 CDT        Diagnoses and all orders for this visit:    1. Essential hypertension (Primary)  -     ECG 12 Lead    2. Chronic heart failure with preserved ejection fraction (HCC)    3. Paroxysmal atrial fibrillation (HCC)    4. Mixed hyperlipidemia          Lo Jade MD  8/9/2022  11:49 CDT

## 2022-08-10 LAB
QT INTERVAL: 420 MS
QTC INTERVAL: 460 MS

## 2022-09-19 ENCOUNTER — TELEPHONE (OUTPATIENT)
Dept: CARDIOLOGY | Facility: CLINIC | Age: 77
End: 2022-09-19

## 2022-09-23 ENCOUNTER — APPOINTMENT (OUTPATIENT)
Dept: ULTRASOUND IMAGING | Facility: HOSPITAL | Age: 77
End: 2022-09-23

## 2022-09-23 ENCOUNTER — HOSPITAL ENCOUNTER (INPATIENT)
Facility: HOSPITAL | Age: 77
LOS: 5 days | Discharge: HOME HEALTH | End: 2022-09-28
Attending: STUDENT IN AN ORGANIZED HEALTH CARE EDUCATION/TRAINING PROGRAM | Admitting: INTERNAL MEDICINE

## 2022-09-23 DIAGNOSIS — Z74.09 IMPAIRED FUNCTIONAL MOBILITY AND ACTIVITY TOLERANCE: ICD-10-CM

## 2022-09-23 DIAGNOSIS — I82.4Y2 ACUTE DEEP VEIN THROMBOSIS (DVT) OF PROXIMAL VEIN OF LEFT LOWER EXTREMITY: Primary | ICD-10-CM

## 2022-09-23 LAB
ANION GAP SERPL CALCULATED.3IONS-SCNC: 10 MMOL/L (ref 5–15)
APTT PPP: 27.1 SECONDS (ref 20–40.3)
BASOPHILS # BLD AUTO: 0.01 10*3/MM3 (ref 0–0.2)
BASOPHILS NFR BLD AUTO: 0.2 % (ref 0–1.5)
BUN SERPL-MCNC: 25 MG/DL (ref 8–23)
BUN/CREAT SERPL: 22.7 (ref 7–25)
CALCIUM SPEC-SCNC: 9.2 MG/DL (ref 8.6–10.5)
CHLORIDE SERPL-SCNC: 101 MMOL/L (ref 98–107)
CO2 SERPL-SCNC: 27 MMOL/L (ref 22–29)
CREAT SERPL-MCNC: 1.1 MG/DL (ref 0.57–1)
DEPRECATED RDW RBC AUTO: 57.2 FL (ref 37–54)
EGFRCR SERPLBLD CKD-EPI 2021: 51.5 ML/MIN/1.73
EOSINOPHIL # BLD AUTO: 0.01 10*3/MM3 (ref 0–0.4)
EOSINOPHIL NFR BLD AUTO: 0.2 % (ref 0.3–6.2)
ERYTHROCYTE [DISTWIDTH] IN BLOOD BY AUTOMATED COUNT: 18.7 % (ref 12.3–15.4)
GLUCOSE BLDC GLUCOMTR-MCNC: 146 MG/DL (ref 70–130)
GLUCOSE BLDC GLUCOMTR-MCNC: 205 MG/DL (ref 70–130)
GLUCOSE SERPL-MCNC: 141 MG/DL (ref 65–99)
HCT VFR BLD AUTO: 35.8 % (ref 34–46.6)
HGB BLD-MCNC: 11.8 G/DL (ref 12–15.9)
HOLD SPECIMEN: NORMAL
HOLD SPECIMEN: NORMAL
IMM GRANULOCYTES # BLD AUTO: 0.02 10*3/MM3 (ref 0–0.05)
IMM GRANULOCYTES NFR BLD AUTO: 0.4 % (ref 0–0.5)
INR PPP: 1.09 (ref 0.8–1.2)
LYMPHOCYTES # BLD AUTO: 0.84 10*3/MM3 (ref 0.7–3.1)
LYMPHOCYTES NFR BLD AUTO: 17.6 % (ref 19.6–45.3)
MCH RBC QN AUTO: 27.3 PG (ref 26.6–33)
MCHC RBC AUTO-ENTMCNC: 33 G/DL (ref 31.5–35.7)
MCV RBC AUTO: 82.9 FL (ref 79–97)
MONOCYTES # BLD AUTO: 0.6 10*3/MM3 (ref 0.1–0.9)
MONOCYTES NFR BLD AUTO: 12.6 % (ref 5–12)
NEUTROPHILS NFR BLD AUTO: 3.3 10*3/MM3 (ref 1.7–7)
NEUTROPHILS NFR BLD AUTO: 69 % (ref 42.7–76)
NRBC BLD AUTO-RTO: 0 /100 WBC (ref 0–0.2)
PLATELET # BLD AUTO: 145 10*3/MM3 (ref 140–450)
PMV BLD AUTO: 9.8 FL (ref 6–12)
POTASSIUM SERPL-SCNC: 4.1 MMOL/L (ref 3.5–5.2)
PROTHROMBIN TIME: 14 SECONDS (ref 11.1–15.3)
RBC # BLD AUTO: 4.32 10*6/MM3 (ref 3.77–5.28)
SODIUM SERPL-SCNC: 138 MMOL/L (ref 136–145)
WBC NRBC COR # BLD: 4.78 10*3/MM3 (ref 3.4–10.8)
WHOLE BLOOD HOLD COAG: NORMAL
WHOLE BLOOD HOLD SPECIMEN: NORMAL

## 2022-09-23 PROCEDURE — 93971 EXTREMITY STUDY: CPT

## 2022-09-23 PROCEDURE — 85730 THROMBOPLASTIN TIME PARTIAL: CPT | Performed by: INTERNAL MEDICINE

## 2022-09-23 PROCEDURE — 82962 GLUCOSE BLOOD TEST: CPT

## 2022-09-23 PROCEDURE — 85025 COMPLETE CBC W/AUTO DIFF WBC: CPT | Performed by: STUDENT IN AN ORGANIZED HEALTH CARE EDUCATION/TRAINING PROGRAM

## 2022-09-23 PROCEDURE — 80048 BASIC METABOLIC PNL TOTAL CA: CPT | Performed by: STUDENT IN AN ORGANIZED HEALTH CARE EDUCATION/TRAINING PROGRAM

## 2022-09-23 PROCEDURE — 99284 EMERGENCY DEPT VISIT MOD MDM: CPT

## 2022-09-23 PROCEDURE — 85610 PROTHROMBIN TIME: CPT | Performed by: INTERNAL MEDICINE

## 2022-09-23 PROCEDURE — 25010000002 HEPARIN (PORCINE) PER 1000 UNITS: Performed by: INTERNAL MEDICINE

## 2022-09-23 RX ORDER — ONDANSETRON 2 MG/ML
4 INJECTION INTRAMUSCULAR; INTRAVENOUS EVERY 6 HOURS PRN
Status: DISCONTINUED | OUTPATIENT
Start: 2022-09-23 | End: 2022-09-28 | Stop reason: HOSPADM

## 2022-09-23 RX ORDER — CARVEDILOL 12.5 MG/1
12.5 TABLET ORAL 2 TIMES DAILY WITH MEALS
Status: DISCONTINUED | OUTPATIENT
Start: 2022-09-23 | End: 2022-09-28 | Stop reason: HOSPADM

## 2022-09-23 RX ORDER — HEPARIN SODIUM 5000 [USP'U]/ML
40 INJECTION, SOLUTION INTRAVENOUS; SUBCUTANEOUS AS NEEDED
Status: DISCONTINUED | OUTPATIENT
Start: 2022-09-23 | End: 2022-09-28

## 2022-09-23 RX ORDER — MELATONIN
2000 DAILY
Status: DISCONTINUED | OUTPATIENT
Start: 2022-09-23 | End: 2022-09-28 | Stop reason: HOSPADM

## 2022-09-23 RX ORDER — FUROSEMIDE 40 MG/1
40 TABLET ORAL DAILY
Status: DISCONTINUED | OUTPATIENT
Start: 2022-09-23 | End: 2022-09-28 | Stop reason: HOSPADM

## 2022-09-23 RX ORDER — NICOTINE POLACRILEX 4 MG
15 LOZENGE BUCCAL
Status: DISCONTINUED | OUTPATIENT
Start: 2022-09-23 | End: 2022-09-28 | Stop reason: HOSPADM

## 2022-09-23 RX ORDER — SODIUM CHLORIDE 0.9 % (FLUSH) 0.9 %
10 SYRINGE (ML) INJECTION AS NEEDED
Status: DISCONTINUED | OUTPATIENT
Start: 2022-09-23 | End: 2022-09-28 | Stop reason: HOSPADM

## 2022-09-23 RX ORDER — NALOXONE HCL 0.4 MG/ML
0.4 VIAL (ML) INJECTION
Status: DISCONTINUED | OUTPATIENT
Start: 2022-09-23 | End: 2022-09-28 | Stop reason: HOSPADM

## 2022-09-23 RX ORDER — ATORVASTATIN CALCIUM 20 MG/1
20 TABLET, FILM COATED ORAL NIGHTLY
Status: DISCONTINUED | OUTPATIENT
Start: 2022-09-23 | End: 2022-09-28 | Stop reason: HOSPADM

## 2022-09-23 RX ORDER — SPIRONOLACTONE 25 MG/1
TABLET ORAL
COMMUNITY
Start: 2022-08-29 | End: 2022-09-28 | Stop reason: HOSPADM

## 2022-09-23 RX ORDER — ACETAMINOPHEN 650 MG/1
650 SUPPOSITORY RECTAL EVERY 4 HOURS PRN
Status: DISCONTINUED | OUTPATIENT
Start: 2022-09-23 | End: 2022-09-23

## 2022-09-23 RX ORDER — CARVEDILOL 12.5 MG/1
TABLET ORAL
COMMUNITY
Start: 2022-09-13 | End: 2022-09-28 | Stop reason: HOSPADM

## 2022-09-23 RX ORDER — SPIRONOLACTONE 25 MG/1
25 TABLET ORAL DAILY
Status: DISCONTINUED | OUTPATIENT
Start: 2022-09-23 | End: 2022-09-28 | Stop reason: HOSPADM

## 2022-09-23 RX ORDER — INSULIN ASPART 100 [IU]/ML
0-14 INJECTION, SOLUTION INTRAVENOUS; SUBCUTANEOUS
Status: DISCONTINUED | OUTPATIENT
Start: 2022-09-23 | End: 2022-09-28 | Stop reason: HOSPADM

## 2022-09-23 RX ORDER — HEPARIN SODIUM 10000 [USP'U]/100ML
10 INJECTION, SOLUTION INTRAVENOUS
Status: DISCONTINUED | OUTPATIENT
Start: 2022-09-23 | End: 2022-09-28

## 2022-09-23 RX ORDER — ACETAMINOPHEN 160 MG/5ML
650 SOLUTION ORAL EVERY 4 HOURS PRN
Status: DISCONTINUED | OUTPATIENT
Start: 2022-09-23 | End: 2022-09-23

## 2022-09-23 RX ORDER — ATORVASTATIN CALCIUM 20 MG/1
TABLET, FILM COATED ORAL
COMMUNITY
Start: 2022-09-01 | End: 2022-09-28 | Stop reason: HOSPADM

## 2022-09-23 RX ORDER — LANOLIN ALCOHOL/MO/W.PET/CERES
5 CREAM (GRAM) TOPICAL NIGHTLY PRN
Status: DISCONTINUED | OUTPATIENT
Start: 2022-09-23 | End: 2022-09-28 | Stop reason: HOSPADM

## 2022-09-23 RX ORDER — ASPIRIN 81 MG/1
81 TABLET ORAL ONCE
Status: COMPLETED | OUTPATIENT
Start: 2022-09-23 | End: 2022-09-23

## 2022-09-23 RX ORDER — CALCIUM CARBONATE 200(500)MG
1 TABLET,CHEWABLE ORAL 2 TIMES DAILY PRN
Status: DISCONTINUED | OUTPATIENT
Start: 2022-09-23 | End: 2022-09-28 | Stop reason: HOSPADM

## 2022-09-23 RX ORDER — HEPARIN SODIUM 5000 [USP'U]/ML
80 INJECTION, SOLUTION INTRAVENOUS; SUBCUTANEOUS AS NEEDED
Status: DISCONTINUED | OUTPATIENT
Start: 2022-09-23 | End: 2022-09-28

## 2022-09-23 RX ORDER — MORPHINE SULFATE 2 MG/ML
2 INJECTION, SOLUTION INTRAMUSCULAR; INTRAVENOUS EVERY 6 HOURS PRN
Status: DISCONTINUED | OUTPATIENT
Start: 2022-09-23 | End: 2022-09-28 | Stop reason: HOSPADM

## 2022-09-23 RX ORDER — SIROLIMUS 0.5 MG/1
0.5 TABLET, FILM COATED ORAL DAILY
Status: DISCONTINUED | OUTPATIENT
Start: 2022-09-23 | End: 2022-09-28 | Stop reason: HOSPADM

## 2022-09-23 RX ORDER — ACETAMINOPHEN 325 MG/1
650 TABLET ORAL EVERY 4 HOURS PRN
Status: DISCONTINUED | OUTPATIENT
Start: 2022-09-23 | End: 2022-09-23

## 2022-09-23 RX ORDER — FERROUS SULFATE TAB EC 324 MG (65 MG FE EQUIVALENT) 324 (65 FE) MG
324 TABLET DELAYED RESPONSE ORAL 2 TIMES DAILY WITH MEALS
Status: DISCONTINUED | OUTPATIENT
Start: 2022-09-23 | End: 2022-09-28 | Stop reason: HOSPADM

## 2022-09-23 RX ORDER — SODIUM CHLORIDE 0.9 % (FLUSH) 0.9 %
10 SYRINGE (ML) INJECTION EVERY 12 HOURS SCHEDULED
Status: DISCONTINUED | OUTPATIENT
Start: 2022-09-23 | End: 2022-09-28 | Stop reason: HOSPADM

## 2022-09-23 RX ORDER — ISOSORBIDE MONONITRATE 60 MG/1
120 TABLET, EXTENDED RELEASE ORAL DAILY
Status: DISCONTINUED | OUTPATIENT
Start: 2022-09-23 | End: 2022-09-28 | Stop reason: HOSPADM

## 2022-09-23 RX ORDER — DEXTROSE MONOHYDRATE 25 G/50ML
25 INJECTION, SOLUTION INTRAVENOUS
Status: DISCONTINUED | OUTPATIENT
Start: 2022-09-23 | End: 2022-09-28 | Stop reason: HOSPADM

## 2022-09-23 RX ADMIN — CARVEDILOL 12.5 MG: 12.5 TABLET, FILM COATED ORAL at 19:20

## 2022-09-23 RX ADMIN — ATORVASTATIN CALCIUM 20 MG: 20 TABLET, FILM COATED ORAL at 21:32

## 2022-09-23 RX ADMIN — CARBIDOPA AND LEVODOPA 1 TABLET: 25; 100 TABLET ORAL at 21:32

## 2022-09-23 RX ADMIN — HEPARIN SODIUM 18 UNITS/KG/HR: 10000 INJECTION, SOLUTION INTRAVENOUS at 19:22

## 2022-09-23 RX ADMIN — ASPIRIN 81 MG: 81 TABLET, FILM COATED ORAL at 19:20

## 2022-09-24 LAB
ANION GAP SERPL CALCULATED.3IONS-SCNC: 9 MMOL/L (ref 5–15)
APTT PPP: 103.7 SECONDS (ref 20–40.3)
APTT PPP: 120.6 SECONDS (ref 20–40.3)
APTT PPP: 80.8 SECONDS (ref 20–40.3)
APTT PPP: 91.5 SECONDS (ref 20–40.3)
BASOPHILS # BLD AUTO: 0 10*3/MM3 (ref 0–0.2)
BASOPHILS NFR BLD AUTO: 0 % (ref 0–1.5)
BUN SERPL-MCNC: 24 MG/DL (ref 8–23)
BUN/CREAT SERPL: 20.2 (ref 7–25)
CALCIUM SPEC-SCNC: 8.9 MG/DL (ref 8.6–10.5)
CHLORIDE SERPL-SCNC: 105 MMOL/L (ref 98–107)
CO2 SERPL-SCNC: 25 MMOL/L (ref 22–29)
CREAT SERPL-MCNC: 1.19 MG/DL (ref 0.57–1)
DEPRECATED RDW RBC AUTO: 58.1 FL (ref 37–54)
EGFRCR SERPLBLD CKD-EPI 2021: 46.9 ML/MIN/1.73
EOSINOPHIL # BLD AUTO: 0.04 10*3/MM3 (ref 0–0.4)
EOSINOPHIL NFR BLD AUTO: 1 % (ref 0.3–6.2)
ERYTHROCYTE [DISTWIDTH] IN BLOOD BY AUTOMATED COUNT: 19.1 % (ref 12.3–15.4)
GLUCOSE BLDC GLUCOMTR-MCNC: 118 MG/DL (ref 70–130)
GLUCOSE BLDC GLUCOMTR-MCNC: 131 MG/DL (ref 70–130)
GLUCOSE BLDC GLUCOMTR-MCNC: 157 MG/DL (ref 70–130)
GLUCOSE BLDC GLUCOMTR-MCNC: 210 MG/DL (ref 70–130)
GLUCOSE SERPL-MCNC: 118 MG/DL (ref 65–99)
HCT VFR BLD AUTO: 34.7 % (ref 34–46.6)
HGB BLD-MCNC: 11.4 G/DL (ref 12–15.9)
HOLD SPECIMEN: NORMAL
IMM GRANULOCYTES # BLD AUTO: 0.03 10*3/MM3 (ref 0–0.05)
IMM GRANULOCYTES NFR BLD AUTO: 0.8 % (ref 0–0.5)
INR PPP: 1.07 (ref 0.8–1.2)
LYMPHOCYTES # BLD AUTO: 0.88 10*3/MM3 (ref 0.7–3.1)
LYMPHOCYTES NFR BLD AUTO: 22.6 % (ref 19.6–45.3)
MAGNESIUM SERPL-MCNC: 2.3 MG/DL (ref 1.6–2.4)
MCH RBC QN AUTO: 27.4 PG (ref 26.6–33)
MCHC RBC AUTO-ENTMCNC: 32.9 G/DL (ref 31.5–35.7)
MCV RBC AUTO: 83.4 FL (ref 79–97)
MONOCYTES # BLD AUTO: 0.46 10*3/MM3 (ref 0.1–0.9)
MONOCYTES NFR BLD AUTO: 11.8 % (ref 5–12)
NEUTROPHILS NFR BLD AUTO: 2.49 10*3/MM3 (ref 1.7–7)
NEUTROPHILS NFR BLD AUTO: 63.8 % (ref 42.7–76)
NRBC BLD AUTO-RTO: 0 /100 WBC (ref 0–0.2)
PLATELET # BLD AUTO: 148 10*3/MM3 (ref 140–450)
PMV BLD AUTO: 10.2 FL (ref 6–12)
POTASSIUM SERPL-SCNC: 4.3 MMOL/L (ref 3.5–5.2)
PROTHROMBIN TIME: 13.8 SECONDS (ref 11.1–15.3)
RBC # BLD AUTO: 4.16 10*6/MM3 (ref 3.77–5.28)
SODIUM SERPL-SCNC: 139 MMOL/L (ref 136–145)
WBC NRBC COR # BLD: 3.9 10*3/MM3 (ref 3.4–10.8)

## 2022-09-24 PROCEDURE — 80048 BASIC METABOLIC PNL TOTAL CA: CPT | Performed by: INTERNAL MEDICINE

## 2022-09-24 PROCEDURE — 97162 PT EVAL MOD COMPLEX 30 MIN: CPT | Performed by: PHYSICAL THERAPIST

## 2022-09-24 PROCEDURE — 83735 ASSAY OF MAGNESIUM: CPT | Performed by: INTERNAL MEDICINE

## 2022-09-24 PROCEDURE — 99222 1ST HOSP IP/OBS MODERATE 55: CPT | Performed by: INTERNAL MEDICINE

## 2022-09-24 PROCEDURE — 85730 THROMBOPLASTIN TIME PARTIAL: CPT | Performed by: INTERNAL MEDICINE

## 2022-09-24 PROCEDURE — 85025 COMPLETE CBC W/AUTO DIFF WBC: CPT | Performed by: INTERNAL MEDICINE

## 2022-09-24 PROCEDURE — 25010000002 HEPARIN (PORCINE) PER 1000 UNITS: Performed by: INTERNAL MEDICINE

## 2022-09-24 PROCEDURE — 63710000001 SIROLIMUS 0.5 MG TABLET: Performed by: INTERNAL MEDICINE

## 2022-09-24 PROCEDURE — 99222 1ST HOSP IP/OBS MODERATE 55: CPT | Performed by: THORACIC SURGERY (CARDIOTHORACIC VASCULAR SURGERY)

## 2022-09-24 PROCEDURE — 82962 GLUCOSE BLOOD TEST: CPT

## 2022-09-24 PROCEDURE — 63710000001 INSULIN ASPART PER 5 UNITS: Performed by: INTERNAL MEDICINE

## 2022-09-24 PROCEDURE — 85610 PROTHROMBIN TIME: CPT | Performed by: INTERNAL MEDICINE

## 2022-09-24 RX ORDER — WARFARIN SODIUM 5 MG/1
5 TABLET ORAL
Status: DISCONTINUED | OUTPATIENT
Start: 2022-09-24 | End: 2022-09-27

## 2022-09-24 RX ADMIN — FERROUS SULFATE TAB EC 324 MG (65 MG FE EQUIVALENT) 324 MG: 324 (65 FE) TABLET DELAYED RESPONSE at 08:31

## 2022-09-24 RX ADMIN — WARFARIN SODIUM 5 MG: 5 TABLET ORAL at 17:32

## 2022-09-24 RX ADMIN — INSULIN ASPART 3 UNITS: 100 INJECTION, SOLUTION INTRAVENOUS; SUBCUTANEOUS at 17:32

## 2022-09-24 RX ADMIN — CARVEDILOL 12.5 MG: 12.5 TABLET, FILM COATED ORAL at 08:31

## 2022-09-24 RX ADMIN — ISOSORBIDE MONONITRATE 120 MG: 60 TABLET, EXTENDED RELEASE ORAL at 08:31

## 2022-09-24 RX ADMIN — CARBIDOPA AND LEVODOPA 1 TABLET: 25; 100 TABLET ORAL at 11:11

## 2022-09-24 RX ADMIN — CARBIDOPA AND LEVODOPA 1 TABLET: 25; 100 TABLET ORAL at 08:31

## 2022-09-24 RX ADMIN — LINAGLIPTIN 5 MG: 5 TABLET, FILM COATED ORAL at 08:31

## 2022-09-24 RX ADMIN — FUROSEMIDE 40 MG: 40 TABLET ORAL at 08:31

## 2022-09-24 RX ADMIN — SPIRONOLACTONE 25 MG: 25 TABLET ORAL at 08:31

## 2022-09-24 RX ADMIN — INSULIN ASPART 5 UNITS: 100 INJECTION, SOLUTION INTRAVENOUS; SUBCUTANEOUS at 11:11

## 2022-09-24 RX ADMIN — CARVEDILOL 12.5 MG: 12.5 TABLET, FILM COATED ORAL at 17:32

## 2022-09-24 RX ADMIN — CARBIDOPA AND LEVODOPA 1 TABLET: 25; 100 TABLET ORAL at 17:32

## 2022-09-24 RX ADMIN — CARBIDOPA AND LEVODOPA 1 TABLET: 25; 100 TABLET ORAL at 20:47

## 2022-09-24 RX ADMIN — SIROLIMUS 0.5 MG: 0.5 TABLET, SUGAR COATED ORAL at 12:16

## 2022-09-24 RX ADMIN — Medication 2000 UNITS: at 08:30

## 2022-09-24 RX ADMIN — HEPARIN SODIUM 14 UNITS/KG/HR: 10000 INJECTION, SOLUTION INTRAVENOUS at 20:45

## 2022-09-24 RX ADMIN — ATORVASTATIN CALCIUM 20 MG: 20 TABLET, FILM COATED ORAL at 20:47

## 2022-09-25 LAB
ANION GAP SERPL CALCULATED.3IONS-SCNC: 8 MMOL/L (ref 5–15)
APTT PPP: 148.1 SECONDS (ref 20–40.3)
APTT PPP: 67.6 SECONDS (ref 20–40.3)
APTT PPP: 77.7 SECONDS (ref 20–40.3)
BASOPHILS # BLD AUTO: 0.02 10*3/MM3 (ref 0–0.2)
BASOPHILS NFR BLD AUTO: 0.5 % (ref 0–1.5)
BUN SERPL-MCNC: 30 MG/DL (ref 8–23)
BUN/CREAT SERPL: 20.3 (ref 7–25)
CALCIUM SPEC-SCNC: 8.7 MG/DL (ref 8.6–10.5)
CHLORIDE SERPL-SCNC: 105 MMOL/L (ref 98–107)
CO2 SERPL-SCNC: 25 MMOL/L (ref 22–29)
CREAT SERPL-MCNC: 1.48 MG/DL (ref 0.57–1)
DEPRECATED RDW RBC AUTO: 59.4 FL (ref 37–54)
EGFRCR SERPLBLD CKD-EPI 2021: 36.1 ML/MIN/1.73
EOSINOPHIL # BLD AUTO: 0.21 10*3/MM3 (ref 0–0.4)
EOSINOPHIL NFR BLD AUTO: 5.1 % (ref 0.3–6.2)
ERYTHROCYTE [DISTWIDTH] IN BLOOD BY AUTOMATED COUNT: 19.4 % (ref 12.3–15.4)
GLUCOSE BLDC GLUCOMTR-MCNC: 137 MG/DL (ref 70–130)
GLUCOSE BLDC GLUCOMTR-MCNC: 137 MG/DL (ref 70–130)
GLUCOSE BLDC GLUCOMTR-MCNC: 157 MG/DL (ref 70–130)
GLUCOSE BLDC GLUCOMTR-MCNC: 176 MG/DL (ref 70–130)
GLUCOSE SERPL-MCNC: 129 MG/DL (ref 65–99)
HCT VFR BLD AUTO: 34.4 % (ref 34–46.6)
HGB BLD-MCNC: 11.5 G/DL (ref 12–15.9)
IMM GRANULOCYTES # BLD AUTO: 0.03 10*3/MM3 (ref 0–0.05)
IMM GRANULOCYTES NFR BLD AUTO: 0.7 % (ref 0–0.5)
INR PPP: 1.08 (ref 0.8–1.2)
LYMPHOCYTES # BLD AUTO: 1.24 10*3/MM3 (ref 0.7–3.1)
LYMPHOCYTES NFR BLD AUTO: 30.3 % (ref 19.6–45.3)
MAGNESIUM SERPL-MCNC: 2 MG/DL (ref 1.6–2.4)
MCH RBC QN AUTO: 28 PG (ref 26.6–33)
MCHC RBC AUTO-ENTMCNC: 33.4 G/DL (ref 31.5–35.7)
MCV RBC AUTO: 83.7 FL (ref 79–97)
MONOCYTES # BLD AUTO: 0.59 10*3/MM3 (ref 0.1–0.9)
MONOCYTES NFR BLD AUTO: 14.4 % (ref 5–12)
NEUTROPHILS NFR BLD AUTO: 2 10*3/MM3 (ref 1.7–7)
NEUTROPHILS NFR BLD AUTO: 49 % (ref 42.7–76)
NRBC BLD AUTO-RTO: 0 /100 WBC (ref 0–0.2)
PLATELET # BLD AUTO: 133 10*3/MM3 (ref 140–450)
PMV BLD AUTO: 10.1 FL (ref 6–12)
POTASSIUM SERPL-SCNC: 4.2 MMOL/L (ref 3.5–5.2)
PROTHROMBIN TIME: 13.9 SECONDS (ref 11.1–15.3)
RBC # BLD AUTO: 4.11 10*6/MM3 (ref 3.77–5.28)
SODIUM SERPL-SCNC: 138 MMOL/L (ref 136–145)
WBC NRBC COR # BLD: 4.09 10*3/MM3 (ref 3.4–10.8)

## 2022-09-25 PROCEDURE — 25010000002 MORPHINE PER 10 MG: Performed by: INTERNAL MEDICINE

## 2022-09-25 PROCEDURE — 80048 BASIC METABOLIC PNL TOTAL CA: CPT | Performed by: INTERNAL MEDICINE

## 2022-09-25 PROCEDURE — 63710000001 INSULIN ASPART PER 5 UNITS: Performed by: INTERNAL MEDICINE

## 2022-09-25 PROCEDURE — 82962 GLUCOSE BLOOD TEST: CPT

## 2022-09-25 PROCEDURE — 63710000001 SIROLIMUS 0.5 MG TABLET: Performed by: INTERNAL MEDICINE

## 2022-09-25 PROCEDURE — 83735 ASSAY OF MAGNESIUM: CPT | Performed by: INTERNAL MEDICINE

## 2022-09-25 PROCEDURE — 25010000002 HEPARIN (PORCINE) PER 1000 UNITS: Performed by: HOSPITALIST

## 2022-09-25 PROCEDURE — 85730 THROMBOPLASTIN TIME PARTIAL: CPT | Performed by: INTERNAL MEDICINE

## 2022-09-25 PROCEDURE — 85610 PROTHROMBIN TIME: CPT | Performed by: INTERNAL MEDICINE

## 2022-09-25 PROCEDURE — 85730 THROMBOPLASTIN TIME PARTIAL: CPT | Performed by: HOSPITALIST

## 2022-09-25 PROCEDURE — 85025 COMPLETE CBC W/AUTO DIFF WBC: CPT | Performed by: INTERNAL MEDICINE

## 2022-09-25 RX ADMIN — CARBIDOPA AND LEVODOPA 1 TABLET: 25; 100 TABLET ORAL at 08:50

## 2022-09-25 RX ADMIN — CARBIDOPA AND LEVODOPA 1 TABLET: 25; 100 TABLET ORAL at 17:25

## 2022-09-25 RX ADMIN — CARVEDILOL 12.5 MG: 12.5 TABLET, FILM COATED ORAL at 17:25

## 2022-09-25 RX ADMIN — HEPARIN SODIUM 2500 UNITS: 5000 INJECTION INTRAVENOUS; SUBCUTANEOUS at 16:07

## 2022-09-25 RX ADMIN — WARFARIN SODIUM 5 MG: 5 TABLET ORAL at 17:25

## 2022-09-25 RX ADMIN — Medication 2000 UNITS: at 08:49

## 2022-09-25 RX ADMIN — INSULIN ASPART 3 UNITS: 100 INJECTION, SOLUTION INTRAVENOUS; SUBCUTANEOUS at 12:11

## 2022-09-25 RX ADMIN — ISOSORBIDE MONONITRATE 120 MG: 60 TABLET, EXTENDED RELEASE ORAL at 08:49

## 2022-09-25 RX ADMIN — SPIRONOLACTONE 25 MG: 25 TABLET ORAL at 08:49

## 2022-09-25 RX ADMIN — CARBIDOPA AND LEVODOPA 1 TABLET: 25; 100 TABLET ORAL at 20:10

## 2022-09-25 RX ADMIN — FERROUS SULFATE TAB EC 324 MG (65 MG FE EQUIVALENT) 324 MG: 324 (65 FE) TABLET DELAYED RESPONSE at 17:25

## 2022-09-25 RX ADMIN — LINAGLIPTIN 5 MG: 5 TABLET, FILM COATED ORAL at 08:50

## 2022-09-25 RX ADMIN — CARVEDILOL 12.5 MG: 12.5 TABLET, FILM COATED ORAL at 08:49

## 2022-09-25 RX ADMIN — SIROLIMUS 0.5 MG: 0.5 TABLET, SUGAR COATED ORAL at 08:48

## 2022-09-25 RX ADMIN — FERROUS SULFATE TAB EC 324 MG (65 MG FE EQUIVALENT) 324 MG: 324 (65 FE) TABLET DELAYED RESPONSE at 08:49

## 2022-09-25 RX ADMIN — FUROSEMIDE 40 MG: 40 TABLET ORAL at 08:49

## 2022-09-25 RX ADMIN — ATORVASTATIN CALCIUM 20 MG: 20 TABLET, FILM COATED ORAL at 20:10

## 2022-09-25 RX ADMIN — Medication 10 ML: at 08:50

## 2022-09-25 RX ADMIN — CARBIDOPA AND LEVODOPA 1 TABLET: 25; 100 TABLET ORAL at 12:10

## 2022-09-25 RX ADMIN — MORPHINE SULFATE 2 MG: 2 INJECTION, SOLUTION INTRAMUSCULAR; INTRAVENOUS at 12:20

## 2022-09-26 LAB
ANION GAP SERPL CALCULATED.3IONS-SCNC: 10 MMOL/L (ref 5–15)
APTT PPP: 144.6 SECONDS (ref 20–40.3)
APTT PPP: 59.2 SECONDS (ref 20–40.3)
APTT PPP: 72.1 SECONDS (ref 20–40.3)
BASOPHILS # BLD AUTO: 0.02 10*3/MM3 (ref 0–0.2)
BASOPHILS NFR BLD AUTO: 0.5 % (ref 0–1.5)
BUN SERPL-MCNC: 27 MG/DL (ref 8–23)
BUN/CREAT SERPL: 22.5 (ref 7–25)
CALCIUM SPEC-SCNC: 8.8 MG/DL (ref 8.6–10.5)
CHLORIDE SERPL-SCNC: 104 MMOL/L (ref 98–107)
CO2 SERPL-SCNC: 26 MMOL/L (ref 22–29)
CREAT SERPL-MCNC: 1.2 MG/DL (ref 0.57–1)
DEPRECATED RDW RBC AUTO: 58.5 FL (ref 37–54)
EGFRCR SERPLBLD CKD-EPI 2021: 46.4 ML/MIN/1.73
EOSINOPHIL # BLD AUTO: 0.21 10*3/MM3 (ref 0–0.4)
EOSINOPHIL NFR BLD AUTO: 4.9 % (ref 0.3–6.2)
ERYTHROCYTE [DISTWIDTH] IN BLOOD BY AUTOMATED COUNT: 19.4 % (ref 12.3–15.4)
GLUCOSE BLDC GLUCOMTR-MCNC: 121 MG/DL (ref 70–130)
GLUCOSE BLDC GLUCOMTR-MCNC: 144 MG/DL (ref 70–130)
GLUCOSE BLDC GLUCOMTR-MCNC: 160 MG/DL (ref 70–130)
GLUCOSE BLDC GLUCOMTR-MCNC: 165 MG/DL (ref 70–130)
GLUCOSE SERPL-MCNC: 129 MG/DL (ref 65–99)
HCT VFR BLD AUTO: 35.2 % (ref 34–46.6)
HGB BLD-MCNC: 11.6 G/DL (ref 12–15.9)
IMM GRANULOCYTES # BLD AUTO: 0.03 10*3/MM3 (ref 0–0.05)
IMM GRANULOCYTES NFR BLD AUTO: 0.7 % (ref 0–0.5)
INR PPP: 1.37 (ref 0.8–1.2)
LYMPHOCYTES # BLD AUTO: 1.29 10*3/MM3 (ref 0.7–3.1)
LYMPHOCYTES NFR BLD AUTO: 30.2 % (ref 19.6–45.3)
MAGNESIUM SERPL-MCNC: 1.9 MG/DL (ref 1.6–2.4)
MCH RBC QN AUTO: 27.4 PG (ref 26.6–33)
MCHC RBC AUTO-ENTMCNC: 33 G/DL (ref 31.5–35.7)
MCV RBC AUTO: 83.2 FL (ref 79–97)
MONOCYTES # BLD AUTO: 0.7 10*3/MM3 (ref 0.1–0.9)
MONOCYTES NFR BLD AUTO: 16.4 % (ref 5–12)
NEUTROPHILS NFR BLD AUTO: 2.02 10*3/MM3 (ref 1.7–7)
NEUTROPHILS NFR BLD AUTO: 47.3 % (ref 42.7–76)
NRBC BLD AUTO-RTO: 0 /100 WBC (ref 0–0.2)
PLATELET # BLD AUTO: 143 10*3/MM3 (ref 140–450)
PMV BLD AUTO: 10.4 FL (ref 6–12)
POTASSIUM SERPL-SCNC: 4 MMOL/L (ref 3.5–5.2)
PROTHROMBIN TIME: 16.9 SECONDS (ref 11.1–15.3)
RBC # BLD AUTO: 4.23 10*6/MM3 (ref 3.77–5.28)
SODIUM SERPL-SCNC: 140 MMOL/L (ref 136–145)
WBC NRBC COR # BLD: 4.27 10*3/MM3 (ref 3.4–10.8)

## 2022-09-26 PROCEDURE — 63710000001 SIROLIMUS 0.5 MG TABLET: Performed by: INTERNAL MEDICINE

## 2022-09-26 PROCEDURE — 83735 ASSAY OF MAGNESIUM: CPT | Performed by: INTERNAL MEDICINE

## 2022-09-26 PROCEDURE — 85025 COMPLETE CBC W/AUTO DIFF WBC: CPT | Performed by: INTERNAL MEDICINE

## 2022-09-26 PROCEDURE — 97110 THERAPEUTIC EXERCISES: CPT

## 2022-09-26 PROCEDURE — 85730 THROMBOPLASTIN TIME PARTIAL: CPT | Performed by: INTERNAL MEDICINE

## 2022-09-26 PROCEDURE — 25010000002 HEPARIN (PORCINE) PER 1000 UNITS: Performed by: INTERNAL MEDICINE

## 2022-09-26 PROCEDURE — 85610 PROTHROMBIN TIME: CPT | Performed by: INTERNAL MEDICINE

## 2022-09-26 PROCEDURE — 82962 GLUCOSE BLOOD TEST: CPT

## 2022-09-26 PROCEDURE — 99231 SBSQ HOSP IP/OBS SF/LOW 25: CPT | Performed by: INTERNAL MEDICINE

## 2022-09-26 PROCEDURE — 63710000001 INSULIN ASPART PER 5 UNITS: Performed by: INTERNAL MEDICINE

## 2022-09-26 PROCEDURE — 97530 THERAPEUTIC ACTIVITIES: CPT

## 2022-09-26 PROCEDURE — 80048 BASIC METABOLIC PNL TOTAL CA: CPT | Performed by: INTERNAL MEDICINE

## 2022-09-26 PROCEDURE — 97116 GAIT TRAINING THERAPY: CPT

## 2022-09-26 RX ADMIN — WARFARIN SODIUM 5 MG: 5 TABLET ORAL at 17:12

## 2022-09-26 RX ADMIN — Medication 10 ML: at 20:33

## 2022-09-26 RX ADMIN — LINAGLIPTIN 5 MG: 5 TABLET, FILM COATED ORAL at 08:58

## 2022-09-26 RX ADMIN — HEPARIN SODIUM 2500 UNITS: 5000 INJECTION INTRAVENOUS; SUBCUTANEOUS at 13:41

## 2022-09-26 RX ADMIN — CARBIDOPA AND LEVODOPA 1 TABLET: 25; 100 TABLET ORAL at 11:04

## 2022-09-26 RX ADMIN — CARBIDOPA AND LEVODOPA 1 TABLET: 25; 100 TABLET ORAL at 08:58

## 2022-09-26 RX ADMIN — CARVEDILOL 12.5 MG: 12.5 TABLET, FILM COATED ORAL at 17:10

## 2022-09-26 RX ADMIN — CARVEDILOL 12.5 MG: 12.5 TABLET, FILM COATED ORAL at 08:58

## 2022-09-26 RX ADMIN — FERROUS SULFATE TAB EC 324 MG (65 MG FE EQUIVALENT) 324 MG: 324 (65 FE) TABLET DELAYED RESPONSE at 08:58

## 2022-09-26 RX ADMIN — SIROLIMUS 0.5 MG: 0.5 TABLET, SUGAR COATED ORAL at 08:59

## 2022-09-26 RX ADMIN — Medication 2000 UNITS: at 08:55

## 2022-09-26 RX ADMIN — ATORVASTATIN CALCIUM 20 MG: 20 TABLET, FILM COATED ORAL at 20:33

## 2022-09-26 RX ADMIN — FERROUS SULFATE TAB EC 324 MG (65 MG FE EQUIVALENT) 324 MG: 324 (65 FE) TABLET DELAYED RESPONSE at 17:10

## 2022-09-26 RX ADMIN — INSULIN ASPART 3 UNITS: 100 INJECTION, SOLUTION INTRAVENOUS; SUBCUTANEOUS at 17:09

## 2022-09-26 RX ADMIN — CARBIDOPA AND LEVODOPA 1 TABLET: 25; 100 TABLET ORAL at 20:33

## 2022-09-26 RX ADMIN — HEPARIN SODIUM 13 UNITS/KG/HR: 10000 INJECTION, SOLUTION INTRAVENOUS at 03:14

## 2022-09-26 RX ADMIN — ISOSORBIDE MONONITRATE 120 MG: 60 TABLET, EXTENDED RELEASE ORAL at 08:56

## 2022-09-26 RX ADMIN — SPIRONOLACTONE 25 MG: 25 TABLET ORAL at 08:58

## 2022-09-26 RX ADMIN — FUROSEMIDE 40 MG: 40 TABLET ORAL at 08:58

## 2022-09-26 RX ADMIN — CARBIDOPA AND LEVODOPA 1 TABLET: 25; 100 TABLET ORAL at 17:09

## 2022-09-26 RX ADMIN — INSULIN ASPART 3 UNITS: 100 INJECTION, SOLUTION INTRAVENOUS; SUBCUTANEOUS at 11:04

## 2022-09-27 LAB
APTT PPP: 161.1 SECONDS (ref 20–40.3)
APTT PPP: 65.4 SECONDS (ref 20–40.3)
APTT PPP: 65.9 SECONDS (ref 20–40.3)
APTT PPP: 69.5 SECONDS (ref 20–40.3)
BASOPHILS # BLD AUTO: 0.02 10*3/MM3 (ref 0–0.2)
BASOPHILS NFR BLD AUTO: 0.5 % (ref 0–1.5)
DEPRECATED RDW RBC AUTO: 58.2 FL (ref 37–54)
EOSINOPHIL # BLD AUTO: 0.2 10*3/MM3 (ref 0–0.4)
EOSINOPHIL NFR BLD AUTO: 4.8 % (ref 0.3–6.2)
ERYTHROCYTE [DISTWIDTH] IN BLOOD BY AUTOMATED COUNT: 19.3 % (ref 12.3–15.4)
GLUCOSE BLDC GLUCOMTR-MCNC: 121 MG/DL (ref 70–130)
GLUCOSE BLDC GLUCOMTR-MCNC: 147 MG/DL (ref 70–130)
GLUCOSE BLDC GLUCOMTR-MCNC: 151 MG/DL (ref 70–130)
HCT VFR BLD AUTO: 34.6 % (ref 34–46.6)
HGB BLD-MCNC: 11.6 G/DL (ref 12–15.9)
IMM GRANULOCYTES # BLD AUTO: 0.04 10*3/MM3 (ref 0–0.05)
IMM GRANULOCYTES NFR BLD AUTO: 1 % (ref 0–0.5)
INR PPP: 2.23 (ref 0.8–1.2)
LYMPHOCYTES # BLD AUTO: 1.38 10*3/MM3 (ref 0.7–3.1)
LYMPHOCYTES NFR BLD AUTO: 32.8 % (ref 19.6–45.3)
MCH RBC QN AUTO: 27.7 PG (ref 26.6–33)
MCHC RBC AUTO-ENTMCNC: 33.5 G/DL (ref 31.5–35.7)
MCV RBC AUTO: 82.6 FL (ref 79–97)
MONOCYTES # BLD AUTO: 0.79 10*3/MM3 (ref 0.1–0.9)
MONOCYTES NFR BLD AUTO: 18.8 % (ref 5–12)
NEUTROPHILS NFR BLD AUTO: 1.78 10*3/MM3 (ref 1.7–7)
NEUTROPHILS NFR BLD AUTO: 42.1 % (ref 42.7–76)
NRBC BLD AUTO-RTO: 0 /100 WBC (ref 0–0.2)
PLATELET # BLD AUTO: 144 10*3/MM3 (ref 140–450)
PMV BLD AUTO: 10.2 FL (ref 6–12)
PROTHROMBIN TIME: 24.8 SECONDS (ref 11.1–15.3)
RBC # BLD AUTO: 4.19 10*6/MM3 (ref 3.77–5.28)
WBC NRBC COR # BLD: 4.21 10*3/MM3 (ref 3.4–10.8)

## 2022-09-27 PROCEDURE — 25010000002 HEPARIN (PORCINE) PER 1000 UNITS: Performed by: INTERNAL MEDICINE

## 2022-09-27 PROCEDURE — 97110 THERAPEUTIC EXERCISES: CPT

## 2022-09-27 PROCEDURE — 85025 COMPLETE CBC W/AUTO DIFF WBC: CPT | Performed by: INTERNAL MEDICINE

## 2022-09-27 PROCEDURE — 85730 THROMBOPLASTIN TIME PARTIAL: CPT | Performed by: INTERNAL MEDICINE

## 2022-09-27 PROCEDURE — 63710000001 SIROLIMUS 0.5 MG TABLET: Performed by: INTERNAL MEDICINE

## 2022-09-27 PROCEDURE — 82962 GLUCOSE BLOOD TEST: CPT

## 2022-09-27 PROCEDURE — 63710000001 INSULIN ASPART PER 5 UNITS: Performed by: INTERNAL MEDICINE

## 2022-09-27 PROCEDURE — 85610 PROTHROMBIN TIME: CPT | Performed by: INTERNAL MEDICINE

## 2022-09-27 PROCEDURE — 97116 GAIT TRAINING THERAPY: CPT

## 2022-09-27 RX ADMIN — Medication 2000 UNITS: at 08:00

## 2022-09-27 RX ADMIN — FERROUS SULFATE TAB EC 324 MG (65 MG FE EQUIVALENT) 324 MG: 324 (65 FE) TABLET DELAYED RESPONSE at 08:00

## 2022-09-27 RX ADMIN — INSULIN ASPART 3 UNITS: 100 INJECTION, SOLUTION INTRAVENOUS; SUBCUTANEOUS at 17:24

## 2022-09-27 RX ADMIN — HEPARIN SODIUM 11 UNITS/KG/HR: 10000 INJECTION, SOLUTION INTRAVENOUS at 18:46

## 2022-09-27 RX ADMIN — HEPARIN SODIUM 2500 UNITS: 5000 INJECTION INTRAVENOUS; SUBCUTANEOUS at 08:16

## 2022-09-27 RX ADMIN — HEPARIN SODIUM 2500 UNITS: 5000 INJECTION INTRAVENOUS; SUBCUTANEOUS at 21:48

## 2022-09-27 RX ADMIN — FERROUS SULFATE TAB EC 324 MG (65 MG FE EQUIVALENT) 324 MG: 324 (65 FE) TABLET DELAYED RESPONSE at 17:24

## 2022-09-27 RX ADMIN — CARVEDILOL 12.5 MG: 12.5 TABLET, FILM COATED ORAL at 17:24

## 2022-09-27 RX ADMIN — SPIRONOLACTONE 25 MG: 25 TABLET ORAL at 08:00

## 2022-09-27 RX ADMIN — FUROSEMIDE 40 MG: 40 TABLET ORAL at 08:01

## 2022-09-27 RX ADMIN — CARBIDOPA AND LEVODOPA 1 TABLET: 25; 100 TABLET ORAL at 20:39

## 2022-09-27 RX ADMIN — LINAGLIPTIN 5 MG: 5 TABLET, FILM COATED ORAL at 08:00

## 2022-09-27 RX ADMIN — SIROLIMUS 0.5 MG: 0.5 TABLET, SUGAR COATED ORAL at 08:01

## 2022-09-27 RX ADMIN — ATORVASTATIN CALCIUM 20 MG: 20 TABLET, FILM COATED ORAL at 20:39

## 2022-09-27 RX ADMIN — Medication 10 ML: at 20:39

## 2022-09-27 RX ADMIN — CARBIDOPA AND LEVODOPA 1 TABLET: 25; 100 TABLET ORAL at 08:00

## 2022-09-27 RX ADMIN — INSULIN ASPART 3 UNITS: 100 INJECTION, SOLUTION INTRAVENOUS; SUBCUTANEOUS at 11:46

## 2022-09-27 RX ADMIN — ISOSORBIDE MONONITRATE 120 MG: 60 TABLET, EXTENDED RELEASE ORAL at 08:05

## 2022-09-27 RX ADMIN — CARBIDOPA AND LEVODOPA 1 TABLET: 25; 100 TABLET ORAL at 11:46

## 2022-09-27 RX ADMIN — CARVEDILOL 12.5 MG: 12.5 TABLET, FILM COATED ORAL at 08:00

## 2022-09-27 RX ADMIN — CARBIDOPA AND LEVODOPA 1 TABLET: 25; 100 TABLET ORAL at 17:23

## 2022-09-28 ENCOUNTER — DOCUMENTATION (OUTPATIENT)
Dept: CARDIAC SURGERY | Facility: CLINIC | Age: 77
End: 2022-09-28

## 2022-09-28 ENCOUNTER — READMISSION MANAGEMENT (OUTPATIENT)
Dept: CALL CENTER | Facility: HOSPITAL | Age: 77
End: 2022-09-28

## 2022-09-28 ENCOUNTER — HOME HEALTH ADMISSION (OUTPATIENT)
Dept: HOME HEALTH SERVICES | Facility: HOME HEALTHCARE | Age: 77
End: 2022-09-28

## 2022-09-28 VITALS
HEART RATE: 71 BPM | DIASTOLIC BLOOD PRESSURE: 68 MMHG | HEIGHT: 67 IN | RESPIRATION RATE: 16 BRPM | BODY MASS INDEX: 20.42 KG/M2 | WEIGHT: 130.07 LBS | OXYGEN SATURATION: 96 % | TEMPERATURE: 98 F | SYSTOLIC BLOOD PRESSURE: 127 MMHG

## 2022-09-28 LAB
APTT PPP: 147.7 SECONDS (ref 20–40.3)
APTT PPP: 69.5 SECONDS (ref 20–40.3)
GLUCOSE BLDC GLUCOMTR-MCNC: 111 MG/DL (ref 70–130)
GLUCOSE BLDC GLUCOMTR-MCNC: 175 MG/DL (ref 70–130)
GLUCOSE BLDC GLUCOMTR-MCNC: 179 MG/DL (ref 70–130)
HOLD SPECIMEN: NORMAL
INR PPP: 2.21 (ref 0.8–1.2)
PROTHROMBIN TIME: 24.6 SECONDS (ref 11.1–15.3)
WHOLE BLOOD HOLD SPECIMEN: NORMAL

## 2022-09-28 PROCEDURE — 82962 GLUCOSE BLOOD TEST: CPT

## 2022-09-28 PROCEDURE — 85730 THROMBOPLASTIN TIME PARTIAL: CPT | Performed by: INTERNAL MEDICINE

## 2022-09-28 PROCEDURE — 63710000001 INSULIN ASPART PER 5 UNITS: Performed by: INTERNAL MEDICINE

## 2022-09-28 PROCEDURE — 85610 PROTHROMBIN TIME: CPT | Performed by: INTERNAL MEDICINE

## 2022-09-28 PROCEDURE — 63710000001 SIROLIMUS 0.5 MG TABLET: Performed by: INTERNAL MEDICINE

## 2022-09-28 RX ORDER — WARFARIN SODIUM 4 MG/1
4 TABLET ORAL NIGHTLY
Qty: 30 TABLET | Refills: 2 | Status: SHIPPED | OUTPATIENT
Start: 2022-09-28

## 2022-09-28 RX ORDER — WARFARIN SODIUM 4 MG/1
4 TABLET ORAL NIGHTLY
Status: DISCONTINUED | OUTPATIENT
Start: 2022-09-28 | End: 2022-09-28 | Stop reason: HOSPADM

## 2022-09-28 RX ADMIN — CARVEDILOL 12.5 MG: 12.5 TABLET, FILM COATED ORAL at 08:24

## 2022-09-28 RX ADMIN — SIROLIMUS 0.5 MG: 0.5 TABLET, SUGAR COATED ORAL at 08:35

## 2022-09-28 RX ADMIN — Medication 2000 UNITS: at 08:25

## 2022-09-28 RX ADMIN — ISOSORBIDE MONONITRATE 120 MG: 60 TABLET, EXTENDED RELEASE ORAL at 08:25

## 2022-09-28 RX ADMIN — Medication 10 ML: at 08:36

## 2022-09-28 RX ADMIN — CARBIDOPA AND LEVODOPA 1 TABLET: 25; 100 TABLET ORAL at 14:45

## 2022-09-28 RX ADMIN — CARBIDOPA AND LEVODOPA 1 TABLET: 25; 100 TABLET ORAL at 08:35

## 2022-09-28 RX ADMIN — SPIRONOLACTONE 25 MG: 25 TABLET ORAL at 08:25

## 2022-09-28 RX ADMIN — LINAGLIPTIN 5 MG: 5 TABLET, FILM COATED ORAL at 08:35

## 2022-09-28 RX ADMIN — INSULIN ASPART 3 UNITS: 100 INJECTION, SOLUTION INTRAVENOUS; SUBCUTANEOUS at 14:49

## 2022-09-28 RX ADMIN — FERROUS SULFATE TAB EC 324 MG (65 MG FE EQUIVALENT) 324 MG: 324 (65 FE) TABLET DELAYED RESPONSE at 08:25

## 2022-09-28 RX ADMIN — FUROSEMIDE 40 MG: 40 TABLET ORAL at 08:25

## 2022-09-28 NOTE — PROGRESS NOTES
Received referral for anticoagulation management; pt is being discharged from [facility] today.  Pt's INR today  Is 2.21.  I consulted ROZ Byrnes as to INR order for recheck date.  Instructions received to have [facility] check INR on Monday, Oct 3rd.  I spoke to VANESA Cooper with  and gave instructions.  Findings reported by Kenny Shore RN.

## 2022-09-29 ENCOUNTER — HOME CARE VISIT (OUTPATIENT)
Dept: HOME HEALTH SERVICES | Facility: CLINIC | Age: 77
End: 2022-09-29

## 2022-09-29 VITALS
DIASTOLIC BLOOD PRESSURE: 76 MMHG | TEMPERATURE: 98.2 F | RESPIRATION RATE: 18 BRPM | HEART RATE: 68 BPM | SYSTOLIC BLOOD PRESSURE: 140 MMHG | OXYGEN SATURATION: 97 %

## 2022-09-29 PROCEDURE — G0152 HHCP-SERV OF OT,EA 15 MIN: HCPCS

## 2022-09-29 PROCEDURE — G0151 HHCP-SERV OF PT,EA 15 MIN: HCPCS

## 2022-09-29 PROCEDURE — G0299 HHS/HOSPICE OF RN EA 15 MIN: HCPCS

## 2022-09-29 NOTE — HOME HEALTH
OCCUPATIONAL THERAPY EVALUATION     REASON FOR REFERRAL:   76 Y/O FEMALE HOSPITALIZED AT [facility] 9/23/22-9/28/22 AFTER BEING SENT FROM PCP DUE TO CONCERN FOR DVT.  PATIENT WAS TREATED FOR ACUTE DVT L LE.  PATIENT REPORTED THAT SHE HAS HAD A STEADY DECLINE SINCE APRIL WHEN SHE FELL AND CUT HER LEFT KNEE AND REQUIRED STITCHES.  PATIENT REPORTED THAT SHE WAS HOSPITALIZED APPROXIMATELY 3 WEEKS AGO FIRST AT [facility] AND SENT HER TO [facility] IN [place] FOR ANEMIA-RECIEVED 2 UNITS OF PRBCS AND FOR FLUID OVERLOAD.  PATIENT WAS NOTED TO BE UNSTEADY, WEAK, AND FATIGUED EASILY WITH ACTIVITY THIS DATE.     PAST MEDICAL HISTORY:  CHF, HLD, GERD, HTN, TIA, ARTHRITIS, A-FIB, LIVER TRANSPLANT, ANEMIA, CARDIOMYOPATHY, CHRONIC HEPATITIS C, DJD-MULTIPLE JOINTS, FIBROMYALGIA, BACK SURGERY X2, CARPAL TUNNEL RELEASE, CHOLECYSTECTOMY, SHOULDER SURGERY, HYSTERECTOMY, GI BLEED.    HOME SOCIAL ENVIRONMENT: PATIENT LIVES IN ONE LEVEL HOME ALONE AND HAS 3 STEPS WITH B HANDRAILS TO ENTER HOME AT NORMAL ENTRANCE THROUGH FRONT DOOR.  HAS SITTER THAT COMES 5 DAYS/WEEK, 8 HOURS/DAY THAT ASSISTS WITH GETTING IN/OUT OF SHOWER, WITH COOKING/CLEANING/LAUNDRY.  PRIOR TO FUNCTIONAL DECLINE, DID NOT USE WALKER AND WAS STAYING ALONE AND DID ALL VDFS    PRIOR LEVEL OF FUNCTION:  INDEPENDENT WITH ADLS, FUNCTIONAL TRANSFERS/FUNCTIONAL MOBILITY USING ROLLATOR WALKER IN THE HOUSE AND RW OUTSIDE OF THE HOUSE.      CLINICAL FINDINGS:  UPPER EXTREMITY ASSESSMENT:   AROM B UES: WFL AND STRENGTH: 4/5 GROSSLY THROUGHOUT.  /PINCH:   RIGHT HAND DOMINANT, B  STRENGTHS: 4/5.                            FINE MOTOR COORDINATION:  WFL                 UPPER EXTREMITY GROSS MOTOR COORDINATION:  WFL  SENSATION:  INTACT B UES GROSSLY THROUGHOUT.  DENIES NUMBNESS/TINGLING.                                                                  FUNCTIONAL ENDURANCE FOR SAFE ACTIVITIES OF DAILY LIVING/INSTRUMENTAL ACTIVITIES OF DAILY LIVING:  Limited endurance noted  "throughout evaluation requiring frequent rest breaks.       BALANCE:             SITTING BALANCE: GOOD  STANDING BALANCE:FAIR   WEIGHT BEARING STATUS/PRECAUTIONS:  WBAT/HIGH FALL RISK  ASSISTIVE DEVICES: RW, ROLLATOR WALKER, LIFT CHAIR, SHOWER CHAIR IN TUB/SHOWER COMBO CURTAIN, GRAB BAR ON SIDE OF TUB AND ONE IN SHOWER, ELEVATED TOILET SEAT WITH B HANDRAILS, REACHER.     MEDICAL NECESSITY:  PATIENT PRESENTS WITH WEAKNESS, HIGH FALL RISK, AND LIMITED ACTIVITY TOLERANCE AFTER RECENT HOSPITALIZATION FOR DVT AND RECENT FUNCTIONAL DECLINE.  SHE REQUIRES SKILLED HOME BASED OT SERVICES FOR FUNCTIONAL TRANSFER TRAINING FOR ADLS, SAFETY EDUCATION/TRAINING DURING ADLS/IADLS/FUNCTIONAL TASKS, B UE STRENGTHENING HEP, STANDING TOLERANCE TRAINING FOR ADLS/IADLS.     PATIENT GOAL FOR THIS EPISODE OF CARE:  \"GET STRONGER SO I CAN GET BACK TO DOING STUFF AGAIN LIKE I USE TO\"  REHAB POTENTIAL :  [facility]  DATE OF NEXT APPOINTMENT WITH DOCTOR:10/4/22 EMERSON COURTNEY, 10/10/22 DR TOLBERT, 10/26/22 DR LAINEZ, 11/7/22 ZORAIDA COURTNEY.  ANTICIPATED DISCHARGE PLAN:  TO SELF/CAREGIVER  PATIENT / CAREGIVER AGREE WITH DISCHARGE PLAN: YES  COMMUNICATION / CARE COORDINATION:  Case communication note to admitting RN with [facility] [place]/# of visits.  WISH TO ADDRESS BATHING WITH OT: NO"

## 2022-09-30 ENCOUNTER — READMISSION MANAGEMENT (OUTPATIENT)
Dept: CALL CENTER | Facility: HOSPITAL | Age: 77
End: 2022-09-30

## 2022-09-30 NOTE — OUTREACH NOTE
"Medical Week 1 Survey    Flowsheet Row Responses   [facility] patient discharged from? [place]   Does the patient have one of the following disease processes/diagnoses(primary or secondary)? Other   Week 1 attempt successful? Yes   Call start time 1443   Call end time 1449   General alerts for this patient Son defers to patient or patient's caretaker for updates.   Person spoke with today (if not patient) and relationship Cele-son.   Comments regarding appointments PCP appt 10/04/22.   Does the patient have a primary care provider?  Yes   Does the patient have an appointment with their PCP within 7 days of discharge? Yes   Has the patient kept scheduled appointments due by today? N/A   Has home health visited the patient within 72 hours of discharge? Yes   Home health comments Son states  has visited and will do INR checks.   Psychosocial issues? No   Psychosocial comments Son states patient has caretaker.    What is the patient's perception of their health status since discharge? Returned to baseline/stable   Week 1 call completed? Yes   Wrap up additional comments Brief call with son who does not live near patient. [facility] [place] has talked with patient who is \"doing okay\". States  has visited, and patient has a caretaker. Denies any known needs today.          ATUL FRIAS - Registered Nurse  "

## 2022-10-02 VITALS
TEMPERATURE: 98.2 F | RESPIRATION RATE: 16 BRPM | OXYGEN SATURATION: 98 % | WEIGHT: 130 LBS | HEART RATE: 84 BPM | DIASTOLIC BLOOD PRESSURE: 72 MMHG | SYSTOLIC BLOOD PRESSURE: 140 MMHG | HEIGHT: 67 IN | BODY MASS INDEX: 20.4 KG/M2

## 2022-10-02 VITALS
DIASTOLIC BLOOD PRESSURE: 76 MMHG | OXYGEN SATURATION: 96 % | RESPIRATION RATE: 16 BRPM | SYSTOLIC BLOOD PRESSURE: 140 MMHG | TEMPERATURE: 98.2 F | HEART RATE: 69 BPM

## 2022-10-03 ENCOUNTER — HOME CARE VISIT (OUTPATIENT)
Dept: HOME HEALTH SERVICES | Facility: HOME HEALTHCARE | Age: 77
End: 2022-10-03

## 2022-10-03 NOTE — HOME HEALTH
Shreya/Case Conference Note            9/29/22 Dasha Wise RN                          Pt is a 76 yo white female admitted to [facility] for services of SN/PT/OT due to recent hospitalization from 9/23-9/28/22 with DVT LLE. Pt has a hx: Hypertensive heart disease with heart failure, A-fib, Cardiomyopathy, Anemia, Osteoarthritis, HLD, Liver transplant.                         Medical necessity and Focus of Care: SN to instruct pt on CHF disease process, s/sx to watch for DVT and medication education.

## 2022-10-03 NOTE — CASE COMMUNICATION
Called and instructed patient to take Coumadin 4mg po tonight. Pateint repeats with 100% accuracy. INR to be done in am  TORB: Genevieve/Coumadin clinic

## 2022-10-04 ENCOUNTER — HOME CARE VISIT (OUTPATIENT)
Dept: HOME HEALTH SERVICES | Facility: CLINIC | Age: 77
End: 2022-10-04

## 2022-10-04 ENCOUNTER — HOME CARE VISIT (OUTPATIENT)
Dept: HOME HEALTH SERVICES | Facility: HOME HEALTHCARE | Age: 77
End: 2022-10-04

## 2022-10-04 ENCOUNTER — ANTICOAGULATION VISIT (OUTPATIENT)
Dept: CARDIAC SURGERY | Facility: CLINIC | Age: 77
End: 2022-10-04

## 2022-10-04 VITALS
HEART RATE: 68 BPM | TEMPERATURE: 98.3 F | SYSTOLIC BLOOD PRESSURE: 136 MMHG | OXYGEN SATURATION: 98 % | RESPIRATION RATE: 18 BRPM | DIASTOLIC BLOOD PRESSURE: 88 MMHG

## 2022-10-04 VITALS
OXYGEN SATURATION: 99 % | DIASTOLIC BLOOD PRESSURE: 70 MMHG | TEMPERATURE: 98.2 F | HEART RATE: 72 BPM | RESPIRATION RATE: 16 BRPM | SYSTOLIC BLOOD PRESSURE: 124 MMHG

## 2022-10-04 DIAGNOSIS — I82.4Y2 ACUTE DEEP VEIN THROMBOSIS (DVT) OF PROXIMAL VEIN OF LEFT LOWER EXTREMITY: Primary | ICD-10-CM

## 2022-10-04 DIAGNOSIS — Z79.01 LONG TERM (CURRENT) USE OF ANTICOAGULANTS: ICD-10-CM

## 2022-10-04 DIAGNOSIS — Z51.81 ENCOUNTER FOR THERAPEUTIC DRUG MONITORING: ICD-10-CM

## 2022-10-04 LAB — INR PPP: 8

## 2022-10-04 PROCEDURE — G0157 HHC PT ASSISTANT EA 15: HCPCS

## 2022-10-04 PROCEDURE — G0299 HHS/HOSPICE OF RN EA 15 MIN: HCPCS

## 2022-10-04 PROCEDURE — 93793 ANTICOAG MGMT PT WARFARIN: CPT | Performed by: NURSE PRACTITIONER

## 2022-10-04 NOTE — PROGRESS NOTES
SYLVIA Lou with [facility] called to report INR >8 on [place]. I advised Dasha to draw a stat pt/INR via venipuncture; she verbalized. We received INR from SYLVIA Loya with [facility] by phone. Shalini was notified by [facility] that PT was 100 and INR was too high to read. I consulted with ROZ Perez who advised pt go to the nearest ER for evaluation and vitamin K. I called pt who was at her PCP office and she had me discuss findings with ROZ Preston. Pineda was already aware of the PT results. Pineda states she will order vitamin K to be given at the [facility] ER and will manage pt warfarin therapy going forward. I advised her pt is currently being followed with [facility] and for further orders to go through them; she verbalized an understanding. I called and advised Shalini that pt PCP Pineda Morris was aware of the situation, is [facility], and will be managing the warfarin therapy going forward; she verbalized an understanding. Will resolve episode. Findings reported by Genevieve Gomes RN.    Today's INR is Lab Results - Last 18 Months   Lab Units 10/04/22  0000   INR  8.00

## 2022-10-05 ENCOUNTER — HOME CARE VISIT (OUTPATIENT)
Dept: HOME HEALTH SERVICES | Facility: CLINIC | Age: 77
End: 2022-10-05

## 2022-10-05 VITALS
RESPIRATION RATE: 18 BRPM | TEMPERATURE: 99 F | OXYGEN SATURATION: 98 % | DIASTOLIC BLOOD PRESSURE: 68 MMHG | HEART RATE: 72 BPM | SYSTOLIC BLOOD PRESSURE: 130 MMHG

## 2022-10-05 PROCEDURE — G0158 HHC OT ASSISTANT EA 15: HCPCS

## 2022-10-05 NOTE — HOME HEALTH
pt sitting in recliner w/ multiple family members present and request trip to bathroom before this Rx began; pt reports has MD appointment in [place] on 10/10/22 at 12:00pm and reuest no HH visit after 10:00am that date

## 2022-10-07 ENCOUNTER — HOME CARE VISIT (OUTPATIENT)
Dept: HOME HEALTH SERVICES | Facility: HOME HEALTHCARE | Age: 77
End: 2022-10-07

## 2022-10-07 ENCOUNTER — HOME CARE VISIT (OUTPATIENT)
Dept: HOME HEALTH SERVICES | Facility: CLINIC | Age: 77
End: 2022-10-07

## 2022-10-07 VITALS
HEART RATE: 73 BPM | SYSTOLIC BLOOD PRESSURE: 122 MMHG | DIASTOLIC BLOOD PRESSURE: 64 MMHG | OXYGEN SATURATION: 97 % | WEIGHT: 130.1 LBS | RESPIRATION RATE: 16 BRPM | BODY MASS INDEX: 20.38 KG/M2 | TEMPERATURE: 98.6 F

## 2022-10-07 PROCEDURE — G0493 RN CARE EA 15 MIN HH/HOSPICE: HCPCS

## 2022-10-10 ENCOUNTER — OFFICE VISIT (OUTPATIENT)
Dept: NEUROLOGY | Age: 77
End: 2022-10-10
Payer: MEDICARE

## 2022-10-10 VITALS
HEART RATE: 74 BPM | DIASTOLIC BLOOD PRESSURE: 71 MMHG | WEIGHT: 131 LBS | SYSTOLIC BLOOD PRESSURE: 107 MMHG | HEIGHT: 67 IN | BODY MASS INDEX: 20.56 KG/M2 | OXYGEN SATURATION: 98 %

## 2022-10-10 DIAGNOSIS — G20 PARKINSON DISEASE (HCC): Primary | ICD-10-CM

## 2022-10-10 DIAGNOSIS — Z86.79 HISTORY OF HYPERTENSION: ICD-10-CM

## 2022-10-10 DIAGNOSIS — Z86.39 HISTORY OF HYPERLIPIDEMIA: ICD-10-CM

## 2022-10-10 PROCEDURE — 99213 OFFICE O/P EST LOW 20 MIN: CPT | Performed by: PSYCHIATRY & NEUROLOGY

## 2022-10-10 PROCEDURE — 1123F ACP DISCUSS/DSCN MKR DOCD: CPT | Performed by: PSYCHIATRY & NEUROLOGY

## 2022-10-10 RX ORDER — FERROUS SULFATE 325(65) MG
TABLET ORAL
COMMUNITY
Start: 2022-07-16 | End: 2022-10-10

## 2022-10-10 RX ORDER — WARFARIN SODIUM 4 MG/1
4 TABLET ORAL NIGHTLY
COMMUNITY
Start: 2022-09-28

## 2022-10-10 NOTE — PROGRESS NOTES
Chief Complaint   Patient presents with    Follow-up     Parkinson's Disease       Sonja Galvan is a 65y.o. year old female who is seen for evaluation of her tremor. She has about a 2 year history of tremor in the left hand and it is occasional noticed in her chin. She has decreased arm swing on the left but does have a history of an injury there remotely. There is no family history of tremor. The patient had a liver transplant in 2003 there has been no changes in her medications for several years. She denies any trouble with her gait per se. Otherwise any focal neurological difficulties. Old records are reviewed. When I saw her 11/19  I felt that she had Parkinson's disease. She was started on Sinemet . Overall doing very well with her tremors. She has hypertension and hyperlipidemia and those are controlled. She has worsening arthritis in her right knee which he has her difficulty getting into the standing position. She has been having occasional falls. No loss of consciousness. On a previous visit 6 months ago her Sinemet was increased from 2 pills 3 times a day to 4 times a day. Frequently she does not take the last dose. When seen last she had had a fall and fractured her wrist.  She was asked to increase her Sinemet to 4 times a day on a daily basis. Last seen here 3 months ago. No further falls. Has had some blood clots for which she is on warfarin now. Active Ambulatory Problems     Diagnosis Date Noted    No Active Ambulatory Problems     Resolved Ambulatory Problems     Diagnosis Date Noted    No Resolved Ambulatory Problems     Past Medical History:   Diagnosis Date    Congenital heart disease     Hyperlipidemia     Liver transplanted [facility])        Past Surgical History:   Procedure Laterality Date    BACK SURGERY      x 10    BLEPHAROPLASTY Bilateral 11/27/2020    HYSTERECTOMY (CERVIX STATUS UNKNOWN)      LIVER TRANSPLANT      SHOULDER SURGERY         History reviewed.  No pertinent family history.     Allergies   Allergen Reactions    Adhesive Tape Other (See Comments)     IV tape causes skin tears  Pt stated it breaks her out    Codeine     Hydrocodone-Acetaminophen     Penicillins        Social History     Socioeconomic History    Marital status:      Spouse name: Not on file    Number of children: Not on file    Years of education: Not on file    Highest education level: Not on file   Occupational History    Not on file   Tobacco Use    Smoking status: Never    Smokeless tobacco: Never   Vaping Use    Vaping Use: Never used   Substance and Sexual Activity    Alcohol use: No    Drug use: No    Sexual activity: Not on file   Other Topics Concern    Not on file   Social History Narrative    Not on file     Social Determinants of Health     Financial Resource Strain: Not on file   Food Insecurity: Not on file   Transportation Needs: Not on file   Physical Activity: Not on file   Stress: Not on file   Social Connections: Not on file   Intimate Partner Violence: Not on file   Housing Stability: Not on file       Review of Systems      Constitutional: []Fever []Sweat []Chills [] Recent Injury [x] Denies all unless marked  HEENT:[]Headache  [] Head Injury/Hearing Loss  [] Sore Throat  [] Ear Ache/Dizziness  [x] Denies all unless marked  Spine:  [] Neck pain  [] Back pain  [] Sciaticia  [x] Denies all unless marked  Cardiovascular:[]Heart Disease []Chest Pain [] Palpitations  [x] Denies all unless marked  Pulmonary: []Shortness of Breath []Cough   [x] Denies all unless marke  Gastrointestinal: []Nausea  []Vomiting  []Abdominal Pain  []Constipation  []Diarrhea  []Dark Bloody Stools  [x] Denies all unless marked  Psychiatric/Behavioral:[] Depression [] Anxiety [x] Denies all unless marked  Genitourinary:   [] Frequency  [] Urgency  [] Incontinence [] Pain with Urination  [x] Denies all unless marked  Extremities: []Pain  []Swelling  [x] Denies all unless marked  Musculoskeletal: [] Muscle Pain [] Joint Pain  [] Arthritis [] Muscle Cramps [] Muscle Twitches  [x] Denies all unless marked  Sleep: [] Insomnia [] Snoring [] Restless Legs [] Sleep Apnea  [] Daytime Sleepiness  [x] Denies all unless marked  Skin:[] Rash [] Skin Discoloration [x] Denies all unless marked   Neurological: []Visual Disturbance/Memory Loss [] Loss of Balance [] Slurred Speech/Weakness [] Seizures  [] Vertigo/Dizziness [x] Denies all unless marked         Current Outpatient Medications   Medication Sig Dispense Refill    warfarin (COUMADIN) 4 MG tablet Take 4 mg by mouth nightly      vitamin D (CHOLECALCIFEROL) 25 MCG (1000 UT) TABS tablet Take 1,000 Units by mouth daily      sirolimus (RAPAMUNE) 0.5 MG TABS tablet Take 0.5 mg by mouth daily      carbidopa-levodopa (SINEMET)  MG per tablet Take 2 pills 4 times daily 240 tablet 5    JANUVIA 25 MG tablet Take 1 tablet by mouth every morning      carvedilol (COREG) 12.5 MG tablet Take 12.5 mg by mouth 2 times daily (with meals)       isosorbide mononitrate (IMDUR) 120 MG extended release tablet Take 120 mg by mouth daily       sirolimus (RAPAMUNE) 1 MG tablet Take 1 mg by mouth daily      spironolactone (ALDACTONE) 25 MG tablet Take 25 mg by mouth daily       furosemide (LASIX) 40 MG tablet Take 40 mg by mouth 2 times daily      atorvastatin (LIPITOR) 20 MG tablet Take 20 mg by mouth daily      magnesium gluconate (MAGONATE) 500 MG tablet Take 500 mg by mouth daily       traMADol (ULTRAM) 50 MG tablet Take 50 mg by mouth every 6 hours as needed for Pain. aspirin 81 MG tablet Take 81 mg by mouth daily (Patient not taking: Reported on 10/10/2022)       No current facility-administered medications for this visit. /71   Pulse 74   Ht 5' 7\" (1.702 m)   Wt 131 lb (59.4 kg)   SpO2 98%   BMI 20.52 kg/m²       Constitutional - well developed, well nourished.     Eyes - conjunctiva normal.  Pupils react to light  Ear, nose, throat -hearing intact to finger rub No about 3 months (around 1/10/2023).     (Please note that portions of this note were completed with a voice recognition program. Efforts were made to edit the dictations but occasionally words are mis-transcribed.)

## 2022-10-12 ENCOUNTER — HOME CARE VISIT (OUTPATIENT)
Dept: HOME HEALTH SERVICES | Facility: CLINIC | Age: 77
End: 2022-10-12

## 2022-10-12 ENCOUNTER — READMISSION MANAGEMENT (OUTPATIENT)
Dept: CALL CENTER | Facility: HOSPITAL | Age: 77
End: 2022-10-12

## 2022-10-12 ENCOUNTER — HOME CARE VISIT (OUTPATIENT)
Dept: HOME HEALTH SERVICES | Facility: HOME HEALTHCARE | Age: 77
End: 2022-10-12

## 2022-10-12 VITALS
HEART RATE: 94 BPM | DIASTOLIC BLOOD PRESSURE: 60 MMHG | RESPIRATION RATE: 18 BRPM | TEMPERATURE: 97.9 F | SYSTOLIC BLOOD PRESSURE: 106 MMHG | OXYGEN SATURATION: 98 %

## 2022-10-12 PROCEDURE — G0151 HHCP-SERV OF PT,EA 15 MIN: HCPCS

## 2022-10-12 NOTE — OUTREACH NOTE
Medical Week 2 Survey    Flowsheet Row Responses   [facility] facility patient discharged from? [place]   Does the patient have one of the following disease processes/diagnoses(primary or secondary)? Other   Week 2 attempt successful? No   Unsuccessful attempts Attempt 1          [facility] NAIR - Registered Nurse

## 2022-10-12 NOTE — HOME HEALTH
Patient reports she has a sinus infection and is taking claritin and keflex (x 5 days). She reports she does her exercises nearly every day and denies falling.  She reports using her walker consistently.

## 2022-10-13 ENCOUNTER — HOME CARE VISIT (OUTPATIENT)
Dept: HOME HEALTH SERVICES | Facility: CLINIC | Age: 77
End: 2022-10-13

## 2022-10-13 VITALS
HEART RATE: 68 BPM | DIASTOLIC BLOOD PRESSURE: 58 MMHG | SYSTOLIC BLOOD PRESSURE: 120 MMHG | RESPIRATION RATE: 18 BRPM | OXYGEN SATURATION: 97 % | TEMPERATURE: 97.7 F

## 2022-10-13 PROCEDURE — G0493 RN CARE EA 15 MIN HH/HOSPICE: HCPCS

## 2022-10-13 PROCEDURE — G0158 HHC OT ASSISTANT EA 15: HCPCS

## 2022-10-14 VITALS
RESPIRATION RATE: 16 BRPM | HEART RATE: 6 BPM | WEIGHT: 131 LBS | TEMPERATURE: 98.1 F | OXYGEN SATURATION: 98 % | SYSTOLIC BLOOD PRESSURE: 120 MMHG | DIASTOLIC BLOOD PRESSURE: 72 MMHG | BODY MASS INDEX: 20.52 KG/M2

## 2022-10-14 NOTE — CASE COMMUNICATION
Patient is progressing well toward OT goals and is expected to meet all goals by next OT visit. Thank you .

## 2022-10-18 ENCOUNTER — HOME CARE VISIT (OUTPATIENT)
Dept: HOME HEALTH SERVICES | Facility: CLINIC | Age: 77
End: 2022-10-18

## 2022-10-18 ENCOUNTER — LAB REQUISITION (OUTPATIENT)
Dept: LAB | Facility: HOSPITAL | Age: 77
End: 2022-10-18

## 2022-10-18 VITALS
BODY MASS INDEX: 20.56 KG/M2 | WEIGHT: 131.3 LBS | RESPIRATION RATE: 16 BRPM | OXYGEN SATURATION: 95 % | TEMPERATURE: 97.9 F | SYSTOLIC BLOOD PRESSURE: 120 MMHG | HEART RATE: 66 BPM | DIASTOLIC BLOOD PRESSURE: 68 MMHG

## 2022-10-18 VITALS
TEMPERATURE: 98.8 F | DIASTOLIC BLOOD PRESSURE: 60 MMHG | RESPIRATION RATE: 18 BRPM | OXYGEN SATURATION: 97 % | SYSTOLIC BLOOD PRESSURE: 128 MMHG | HEART RATE: 72 BPM

## 2022-10-18 DIAGNOSIS — I82.442 ACUTE EMBOLISM AND THROMBOSIS OF LEFT TIBIAL VEIN: ICD-10-CM

## 2022-10-18 LAB
HH POC INTERNATIONAL NORMALIZATION RATIO: 6.5
HH POC PROTIME: 0 SECONDS
INR PPP: 4.94 (ref 0.8–1.2)
PROTHROMBIN TIME: 46.6 SECONDS (ref 11.1–15.3)

## 2022-10-18 PROCEDURE — G0299 HHS/HOSPICE OF RN EA 15 MIN: HCPCS

## 2022-10-18 PROCEDURE — 85610 PROTHROMBIN TIME: CPT | Performed by: NURSE PRACTITIONER

## 2022-10-18 PROCEDURE — G0157 HHC PT ASSISTANT EA 15: HCPCS

## 2022-10-19 ENCOUNTER — HOME CARE VISIT (OUTPATIENT)
Dept: HOME HEALTH SERVICES | Facility: CLINIC | Age: 77
End: 2022-10-19

## 2022-10-19 VITALS
RESPIRATION RATE: 18 BRPM | TEMPERATURE: 97.8 F | SYSTOLIC BLOOD PRESSURE: 110 MMHG | HEART RATE: 62 BPM | DIASTOLIC BLOOD PRESSURE: 62 MMHG | OXYGEN SATURATION: 96 %

## 2022-10-19 PROCEDURE — G0158 HHC OT ASSISTANT EA 15: HCPCS

## 2022-10-19 NOTE — HOME HEALTH
pt sitting in recliner w/ daughter present and reports HH SN present eariler this date for PT/INR check; pt reports still unable to get PT/INR levels consistent

## 2022-10-21 ENCOUNTER — HOME CARE VISIT (OUTPATIENT)
Dept: HOME HEALTH SERVICES | Facility: CLINIC | Age: 77
End: 2022-10-21

## 2022-10-21 ENCOUNTER — HOME CARE VISIT (OUTPATIENT)
Dept: HOME HEALTH SERVICES | Facility: HOME HEALTHCARE | Age: 77
End: 2022-10-21

## 2022-10-21 VITALS
TEMPERATURE: 96.9 F | HEART RATE: 63 BPM | DIASTOLIC BLOOD PRESSURE: 62 MMHG | SYSTOLIC BLOOD PRESSURE: 108 MMHG | OXYGEN SATURATION: 97 % | RESPIRATION RATE: 20 BRPM

## 2022-10-21 PROCEDURE — G0299 HHS/HOSPICE OF RN EA 15 MIN: HCPCS

## 2022-10-21 NOTE — CASE COMMUNICATION
Patient has met all OT goals and completed OT POC.   OT discharge summary is complete and ready for co-sign. THANK YOU.

## 2022-10-21 NOTE — CASE COMMUNICATION
[facility] lab called our office at 1312 today to report a critical lab value for patient's PT/INR level. The readings are INR: 8.0, PT: 74.9.  I called VIJAYA Felton RN at 1314 (whom saw the patient today) and she states she will contact the attending  provider, ROZ Preston.  She states she has been in contact with the office already due to the coagulation machine reading elevated.

## 2022-10-24 ENCOUNTER — HOME CARE VISIT (OUTPATIENT)
Dept: HOME HEALTH SERVICES | Facility: CLINIC | Age: 77
End: 2022-10-24

## 2022-10-24 VITALS
HEART RATE: 70 BPM | WEIGHT: 130.4 LBS | OXYGEN SATURATION: 98 % | DIASTOLIC BLOOD PRESSURE: 74 MMHG | BODY MASS INDEX: 20.42 KG/M2 | RESPIRATION RATE: 18 BRPM | SYSTOLIC BLOOD PRESSURE: 130 MMHG | TEMPERATURE: 98.3 F

## 2022-10-24 PROCEDURE — G0299 HHS/HOSPICE OF RN EA 15 MIN: HCPCS

## 2022-10-25 ENCOUNTER — HOME CARE VISIT (OUTPATIENT)
Dept: HOME HEALTH SERVICES | Facility: CLINIC | Age: 77
End: 2022-10-25

## 2022-10-25 VITALS
DIASTOLIC BLOOD PRESSURE: 74 MMHG | OXYGEN SATURATION: 96 % | TEMPERATURE: 98.7 F | RESPIRATION RATE: 18 BRPM | SYSTOLIC BLOOD PRESSURE: 112 MMHG | HEART RATE: 70 BPM

## 2022-10-25 PROBLEM — D64.9 ANEMIA: Status: ACTIVE | Noted: 2022-10-25

## 2022-10-25 PROBLEM — K92.2 GASTROINTESTINAL HEMORRHAGE: Status: ACTIVE | Noted: 2022-10-25

## 2022-10-25 PROBLEM — I82.409 RECURRENT DEEP VEIN THROMBOSIS (DVT) (HCC): Status: ACTIVE | Noted: 2022-10-25

## 2022-10-25 PROCEDURE — G0157 HHC PT ASSISTANT EA 15: HCPCS

## 2022-10-26 ENCOUNTER — OFFICE VISIT (OUTPATIENT)
Dept: ONCOLOGY | Facility: CLINIC | Age: 77
End: 2022-10-26

## 2022-10-26 VITALS
DIASTOLIC BLOOD PRESSURE: 56 MMHG | OXYGEN SATURATION: 96 % | SYSTOLIC BLOOD PRESSURE: 118 MMHG | BODY MASS INDEX: 21.38 KG/M2 | HEART RATE: 75 BPM | TEMPERATURE: 97.6 F | WEIGHT: 136.5 LBS

## 2022-10-26 DIAGNOSIS — K92.2 GASTROINTESTINAL HEMORRHAGE, UNSPECIFIED GASTROINTESTINAL HEMORRHAGE TYPE: ICD-10-CM

## 2022-10-26 DIAGNOSIS — D64.9 ANEMIA, UNSPECIFIED TYPE: ICD-10-CM

## 2022-10-26 DIAGNOSIS — I82.409 RECURRENT DEEP VEIN THROMBOSIS (DVT): Primary | ICD-10-CM

## 2022-10-26 PROCEDURE — G0463 HOSPITAL OUTPT CLINIC VISIT: HCPCS | Performed by: INTERNAL MEDICINE

## 2022-10-26 PROCEDURE — 99214 OFFICE O/P EST MOD 30 MIN: CPT | Performed by: INTERNAL MEDICINE

## 2022-10-26 RX ORDER — ATORVASTATIN CALCIUM 20 MG/1
TABLET, FILM COATED ORAL
COMMUNITY
Start: 2022-10-13 | End: 2022-10-26

## 2022-10-26 RX ORDER — MELATONIN
1000
COMMUNITY
End: 2022-11-07

## 2022-10-26 RX ORDER — SIROLIMUS 0.5 MG/1
0.5 TABLET, FILM COATED ORAL DAILY
COMMUNITY

## 2022-10-26 NOTE — HOME HEALTH
pt reports had scheduled appointment this am w/ PCP earlier this date with good results of PT/INR; pt reports had doppler US on L LE to rule out DVT on 10/24/22

## 2022-10-26 NOTE — PROGRESS NOTES
"Chief Complaint  Follow-up -DVT, GI bleed    Subjective        Dora Velazquez presents to [facility]EMAT[facility]  History of Present Illness     Dora Velazquez was seen in follow-up for recurrent DVT and GI bleeding.  She has been stable since hospital discharge.  She is on Coumadin.  Has not had any bleeding problem.  Her left leg pain has improved.  Counseled about fall precautions.    Objective   Vital Signs:  /56   Pulse 75   Temp 97.6 °F (36.4 °C) (Temporal)   Wt 61.9 kg (136 lb 8 oz)   SpO2 96%   BMI 21.38 kg/m²   Estimated body mass index is 21.38 kg/m² as calculated from the following:    Height as of 9/29/22: 170.2 cm (67\").    Weight as of this encounter: 61.9 kg (136 lb 8 oz).    BMI is within normal parameters. No other follow-up for BMI required.      Physical Exam   Result Review :                Assessment and Plan {CC Problem List  Visit Diagnosis   ROS  Review (Popup)  MoneyMan Maintenance  Quality  BestPractice  Medications  SmartSets  SnapShot Encounters  Media :23}  Diagnoses and all orders for this visit:    1. Recurrent deep vein thrombosis (DVT) (HCC) (Primary)    2. Anemia, unspecified type    3. Gastrointestinal hemorrhage, unspecified gastrointestinal hemorrhage type      Patient with history of recurrent symptomatic left femoral DVT.  She was on Eliquis in the past however developed significant GI bleeding requiring blood transfusion.  Her Eliquis was stopped and she was recently admitted to our hospital in September 2022 with left leg pain and venous Doppler study showed nonocclusive thrombus in the left common femoral vein.    I have very lengthy discussion with patient and his son who lives in [place] about her diagnosis, prognosis and treatment options.  Patient is at fall risk due to osteoarthritis and Parkinson's.  Her history of GI bleeding especially on Eliquis was concerning.  We discontinue Eliquis and after " careful consideration of risk versus benefit was decided to start her on Coumadin.    Since her hospital discharge she is doing well.  Her left leg pain has resolved.  She has not any bleeding complications.  Recommend continue Coumadin.  Counseled about fall precautions.      I will plan to see her back in 3 months with CBC, CMP, ferritin, iron profile.  He was instructed to call us or come to the hospital if any concern for bleeding.             Follow Up   No follow-ups on file.  Patient was given instructions and counseling regarding her condition or for health maintenance advice. Please see specific information pulled into the AVS if appropriate.

## 2022-11-01 ENCOUNTER — HOME CARE VISIT (OUTPATIENT)
Dept: HOME HEALTH SERVICES | Facility: CLINIC | Age: 77
End: 2022-11-01

## 2022-11-01 VITALS
RESPIRATION RATE: 18 BRPM | DIASTOLIC BLOOD PRESSURE: 82 MMHG | OXYGEN SATURATION: 95 % | HEART RATE: 72 BPM | TEMPERATURE: 98.5 F | SYSTOLIC BLOOD PRESSURE: 126 MMHG

## 2022-11-01 PROCEDURE — G0299 HHS/HOSPICE OF RN EA 15 MIN: HCPCS

## 2022-11-01 PROCEDURE — G0157 HHC PT ASSISTANT EA 15: HCPCS

## 2022-11-02 VITALS
TEMPERATURE: 97.3 F | HEART RATE: 71 BPM | RESPIRATION RATE: 18 BRPM | SYSTOLIC BLOOD PRESSURE: 102 MMHG | DIASTOLIC BLOOD PRESSURE: 58 MMHG | OXYGEN SATURATION: 96 %

## 2022-11-02 NOTE — HOME HEALTH
pt sitting in recliner w/ B Les elevated, and daughter present; pt reports improveing Ind. w/ mobilities in/out home

## 2022-11-07 ENCOUNTER — OFFICE VISIT (OUTPATIENT)
Dept: CARDIOLOGY | Facility: CLINIC | Age: 77
End: 2022-11-07

## 2022-11-07 VITALS
BODY MASS INDEX: 21.56 KG/M2 | HEART RATE: 62 BPM | WEIGHT: 137.4 LBS | SYSTOLIC BLOOD PRESSURE: 118 MMHG | HEIGHT: 67 IN | OXYGEN SATURATION: 99 % | DIASTOLIC BLOOD PRESSURE: 72 MMHG

## 2022-11-07 DIAGNOSIS — R25.2 MUSCLE CRAMPS: ICD-10-CM

## 2022-11-07 DIAGNOSIS — I50.23 ACUTE ON CHRONIC SYSTOLIC HEART FAILURE: Primary | ICD-10-CM

## 2022-11-07 PROCEDURE — 99214 OFFICE O/P EST MOD 30 MIN: CPT | Performed by: NURSE PRACTITIONER

## 2022-11-07 NOTE — PROGRESS NOTES
Chronic heart failure with preserved ejection fraction     Nephrology: Dr. Harrison  Congestive Heart Failure  Associated symptoms include shortness of breath (Unchanged). Pertinent negatives include no abdominal pain, chest pain or palpitations.     Follows Dr. Edmond Jade, Cardiologist here at [facility] in [place].    Ms. Dora Velazquez is a 76-year-old female with medical history of Parkinson's disease, paroxysmal atrial fibrillation, CKD, history of GI bleed, chronic heart failure with preserved EF, hypertension, hyperlipidemia, left bundle branch block, and history of liver transplant for HCV Cirrhosis.  She was on chronic anticoagulation for atrial fibrillation but it had to be stopped due to GI bleed.    She reports 2-3 weeks of shortness of air and weakness.  Presented to [facility] and wanted to be transferred to [place] but due to bed shortage here locally she decided to go to [facility]  in [place].  This is where her previous liver transplant surgery was done.  She was admitted and kept for CHF/fluid overload.  She normally sleeps on 2 pillows this is not changed for years.  She has longstanding history of hypertension.  She is on long-term immunosuppressant medications as well.      She was discharged from hospital 2 days ago.  She reports that she is feeling a little bit better.    Today, she presents for follow up regarding HFrEF.  She denies chest pain, shortness of breath, PND, orthopnea.  Denies lower leg swelling.  Is complaining of bilateral leg pain.  Posterior thighs are cramping when she walks.  I did chart review and an ultrasound/Doppler of left extremity was done in September showed left superficial femoral DVT but not occlusive.  She is on warfarin.  She reports cramping started at last hospitalization.  We discussed that this could be causing her pain when she is walking.  She reports left leg is worse than right.  I encouraged her to rest when  the pain occurred.  She reports that she is drinking at least bottles 4 water daily.  I saw Dr. Truong on 10/26/2022.  She reports that she is doing physical therapy exercises at home.  She has a sheet that shows her how to do certain leg exercises.  These exercises do not make pain worse.    Recent echocardiogram shows LVEF the same around 45 percent.  Left atrial volume severely increased.  This is changed.  Other findings similar to echo in 2018.    Past Medical History:   Diagnosis Date   • Arthritis    • Backache    • Bilateral pleural effusion    • Blood in feces     Blood in feces symptom   • Capsulitis    • Cardiomyopathy (HCC)      HFpEF, improved      • Chest pain    • CHF (congestive heart failure) (HCC)    • Chronic anemia    • Chronic hepatitis C (HCC)    • Degenerative joint disease involving multiple joints    • Dilated cardiomyopathy (HCC)    • Dyslipidemia    • Dyspnea    • Edema of leg    • Epistaxis    • Essential hypertension    • Fibromyalgia, primary    • GERD (gastroesophageal reflux disease)    • H/O endoscopy 06/30/2014    Colon endoscopy 09128   • Headache    • Hemorrhoids     internal & external   • History of liver recipient (HCC)     S/[facility] 2003      • Hyperlipemia    • Hyperlipidemia    • Hypertension    • Hypokalemia    • Left bundle branch block    • Metatarsalgia     Metatarsalgia - with fat pad atrophie      • Pap smear for cervical cancer screening 02/09/1996   • Paroxysmal atrial fibrillation (HCC)    • Polyp of sigmoid colon    • Salmonella arthritis (HCC)    • Transient cerebral ischemia     Unspecified     Past Surgical History:   Procedure Laterality Date   • BACK SURGERY  1995    Back Surgery (2)      • CARDIAC CATHETERIZATION  11/03/2005    Orders Not Yet Performed: 0       • CARPAL TUNNEL RELEASE  02/12/2007    Carpal tunnel surgery (1)     • CAUTERIZATION NASAL BLEEDERS  03/09/2000    Control nose/throat bleeding (1)      • CHOLECYSTECTOMY  2000   •  COLONOSCOPY N/A 8/14/2019    Procedure: COLONOSCOPY;  Surgeon: Chaparro Mckeon MD;  Location: [facility] ENDOSCOPY;  Service: Gastroenterology   • LAPAROSCOPIC LYSIS OF ADHESIONS  07/16/1992    Laparoscopy; lysis of adhesions (1)      • LIVER BIOPSY  01/05/1998    Needle biopsy of liver [id number] (1)      • LIVER TRANSPLANTATION  2003    Anesth, for liver transplant (1)      • SALPINGO OOPHORECTOMY  1974    Salpingo-oophorectomy (1)      • SHOULDER SURGERY  08/29/2007    Shoulder surgery procedure (1)      • TOTAL ABDOMINAL HYSTERECTOMY  1968    Total abd hysterectomy (1)        Social History     Socioeconomic History   • Marital status: Single   Tobacco Use   • Smoking status: Never   • Smokeless tobacco: Never   Vaping Use   • Vaping Use: Never used   Substance and Sexual Activity   • Alcohol use: No   • Drug use: No   • Sexual activity: Defer     Family History   Problem Relation Age of Onset   • Heart attack Father    • Stroke Father    • Cancer Other         Other   • Heart disease Other    • Hypertension Other    • Cholelithiasis Other        ALLERGIES:  Allergies   Allergen Reactions   • Lortab [Hydrocodone-Acetaminophen] Other (See Comments)     Can cause problems for liver has had transplant    • Penicillins Anaphylaxis and Hives   • Adhesive Tape Other (See Comments)     IV tape causes skin tears  Pt stated it breaks her out   • Tape Other (See Comments)     IV tape causes skin tears  Pt stated it breaks her out   • Codeine Hives         Review of Systems   Constitutional: Negative for diaphoresis and malaise/fatigue.   HENT: Negative for congestion and sore throat.    Eyes: Negative for blurred vision and double vision.   Cardiovascular: Negative for chest pain, dyspnea on exertion, leg swelling, orthopnea, palpitations and syncope.   Respiratory: Positive for shortness of breath (Unchanged). Negative for cough and wheezing.    Endocrine: Negative for polydipsia and polyphagia.   Musculoskeletal: Positive for  muscle cramps (Posterior thighs) and myalgias. Negative for falls.        Walks with walker   Gastrointestinal: Negative for bloating, abdominal pain, melena and nausea.   Genitourinary: Negative.    Neurological: Negative for difficulty with concentration, dizziness, headaches and light-headedness.   Psychiatric/Behavioral: Negative.    Allergic/Immunologic: Negative.        Current Outpatient Medications   Medication Sig Dispense Refill   • acetaminophen (TYLENOL) 325 MG tablet Take 650 mg by mouth Every 6 (Six) Hours As Needed for Fever, Headache or Mild Pain. Indications: Fever, Pain     • atorvastatin (LIPITOR) 40 MG tablet Take 20 mg by mouth Every Night. Indications: High Amount of Fats in the Blood     • carbidopa-levodopa (SINEMET)  MG per tablet 4 (Four) Times a Day. Indications: Restless Leg Syndrome     • carvedilol (COREG) 25 MG tablet Take 12.5 mg by mouth 2 (Two) Times a Day. Indications: Cardiac Failure     • FeroSul 325 (65 Fe) MG tablet Take 325 mg by mouth Daily With Breakfast. Indications: Iron Deficiency, Restless Leg Syndrome     • furosemide (LASIX) 40 MG tablet Take 40 mg by mouth Daily. Indications: Cardiac Failure     • isosorbide mononitrate (IMDUR) 120 MG 24 hr tablet Take 120 mg by mouth Daily. Indications: Stable Angina Pectoris     • Januvia 25 MG tablet Take 25 mg by mouth 4 (Four) Times a Week. Take Monday, Wednesday, Friday and saturday  Indications: Type 2 Diabetes     • Loratadine (CLARITIN PO) Take 10 mg by mouth Daily. Indications: seasonal allergies     • magnesium gluconate (MAGONATE) 500 MG tablet Take  by mouth. Indications: supplement     • sirolimus (RAPAMUNE) 1 MG tablet Take 1 tablet by mouth.     • spironolactone (ALDACTONE) 50 MG tablet Take 25 mg by mouth Every Morning. Indications: Edema, High Blood Pressure Disorder     • vitamin D3 125 MCG (5000 UT) capsule capsule Take 2,000 Units by mouth Daily. Indications: Osteoporosis     • warfarin (COUMADIN) 4 MG  "tablet Take 1 tablet by mouth Every Night. Indications: DVT/PE (active thrombosis) (Patient taking differently: Take 1 tablet by mouth Every Night. 2mg 10/24/2022  Indications: DVT/PE (active thrombosis)) 30 tablet 2     No current facility-administered medications for this visit.       OBJECTIVE:    Physical Exam:   Vitals reviewed.   Constitutional:       Appearance: Well-groomed, normal weight and not in distress.   Eyes:      General: Lids are normal.      Conjunctiva/sclera: Conjunctivae normal.      Right eye: Right conjunctiva is not injected.      Left eye: Left conjunctiva is not injected.      Pupils: Pupils are equal, round, and reactive to light.   HENT:      Head: Normocephalic and atraumatic.   Neck:      Thyroid: No thyromegaly.      Vascular: No JVD. JVD normal.      Trachea: Trachea normal.   Pulmonary:      Effort: Pulmonary effort is normal.      Breath sounds: Normal breath sounds and air entry. No wheezing. No rhonchi.   Cardiovascular:      PMI at left midclavicular line. Normal rate. Regular rhythm. Normal S1. Normal S2.      Murmurs: There is no murmur.      No gallop.   Edema:     Peripheral edema absent.   Abdominal:      General: Bowel sounds are normal. There is no distension.      Palpations: Abdomen is soft.      Tenderness: There is no abdominal tenderness.   Skin:     General: Skin is warm and dry.      Coloration: Skin is not jaundiced.   Neurological:      Mental Status: Alert and oriented to person, place, and time.   Psychiatric:         Attention and Perception: Attention normal.         Speech: Speech normal.         Behavior: Behavior is cooperative.       Vitals:    11/07/22 1022   BP: 118/72   BP Location: Left arm   Patient Position: Sitting   Cuff Size: Adult   Pulse: 62   SpO2: 99%   Weight: 62.3 kg (137 lb 6.4 oz)   Height: 170.2 cm (67\")       DATA REVIEWED:   Results for orders placed in visit on 09/15/22    Adult Transthoracic Echo Complete W/ Cont if Necessary Per " Protocol    Interpretation Summary  · Calculated left ventricular 3D EF = 46% Estimated left ventricular EF = 45% Left ventricular ejection fraction appears to be 41 - 45%. Left ventricular systolic function is low normal.  · The left ventricular cavity is borderline dilated.  · Left ventricular diastolic dysfunction is noted.  · Left atrial volume is severely increased.  · Estimated right ventricular systolic pressure from tricuspid regurgitation is normal (<35 mmHg).      No radiology results for the last 30 days.  CXR:     CT:     Labs: BMP, CBC, LIPID, TSH  Lab Results   Component Value Date    GLUCOSE 129 (H) 09/26/2022    CALCIUM 8.8 09/26/2022     09/26/2022    K 4.0 09/26/2022    CO2 26.0 09/26/2022     09/26/2022    BUN 27 (H) 09/26/2022    CREATININE 1.20 (H) 09/26/2022    EGFRIFNONA 29 (L) 07/10/2019    BCR 22.5 09/26/2022    ANIONGAP 10.0 09/26/2022     Lab Results   Component Value Date    WBC 4.21 09/27/2022    HGB 11.6 (L) 09/27/2022    HCT 34.6 09/27/2022    MCV 82.6 09/27/2022     09/27/2022     No results found for: CHOL  No results found for: TRIG  No results found for: HDL  No components found for: LDLCALC  No results found for: LDL  No results found for: HDLLDLRATIO  No components found for: CHOLHDL  No results found for: TSH  No results found for: PROBNP  EKG:     ECHO 9/15/2022  Interpretation Summary    • Calculated left ventricular 3D EF = 46% Estimated left ventricular EF = 45% Left ventricular ejection fraction appears to be 41 - 45%. Left ventricular systolic function is low normal.  • The left ventricular cavity is borderline dilated.  • Left ventricular diastolic dysfunction is noted.  • Left atrial volume is severely increased.  • Estimated right ventricular systolic pressure from tricuspid regurgitation is normal (<35 mmHg).     Echo   7/10/2018  Interpretation Summary    · Left atrial cavity size is mildly dilated.  · The left ventricular cavity is mildly  dilated.  · Left ventricular systolic function is low normal. Estimated EF = 45%.  · Left ventricular diastolic dysfunction (grade I a) consistent with impaired relaxation.  · Left ventricular wall thickness is consistent with mild concentric hypertrophy.      The following portions of the patient's history were reviewed and updated as appropriate: allergies, current medications, past family history, past medical history, past social history, past surgical history and problem list.  Old records reviewed and pertinent information is included in the above objective data.     ASSESSMENT/PLAN:     Diagnosis Plan   1. Acute on chronic systolic heart failure (HCC)           1.  Chronic heart failure with reduced EF.    NICM: EF: 45%. NYHA Class II, Stage C. Patient appears euvolemic. and in a well perfused physiologic state. Hemodynamics are acceptable    BETA-BLOCKER: Carvedilol 25 mg twice daily  ACE/ARB: Cant take due to immunosuppressive medication  ENTRESTO: not indicated  DIURETIC: Lasix 40 mg daily  SGL2I: not indicated  ALDOSTERONE ANTAGONIST: Spironolactone 50 mg daily  IMDUR/HYDRALAZINE: Imdur 120 mg daily  DIGOXIN: N/A    Fluid restriction: 2 L discussed adequate hydration due to DVT treatment.  She is taking warfarin and managed by PCP  Sodium restriction:2 grams    6MWT: not able to do,using walker  Cardiac Rehab: not indicated  ICD: not indicated   ADHF: 7/2022    2.  Muscle cramps.  Posterior thighs.  She has known nonocclusive DVT to left superficial femoral per Doppler and on Coumadin therapy. Encouraged her to reach out Dr. Vu.  Managed by hematology, Dr. Vu    I spent 21minutes caring for Dora on this date of service. This time includes time spent by me in the following activities: reviewing tests, obtaining and/or reviewing a separately obtained history, performing a medically appropriate examination and/or evaluation, counseling and educating the patient/family/caregiver and documenting  information in the medical record  Return in about 3 months (around 2/7/2023) for Recheck.      This document has been electronically signed by ROZ Cannon on November 7, 2022 09:36 CST   Electronically signed by ROZ Cannon, 11/07/22, 9:36 AM CST.

## 2022-11-08 ENCOUNTER — HOME CARE VISIT (OUTPATIENT)
Dept: HOME HEALTH SERVICES | Facility: CLINIC | Age: 77
End: 2022-11-08

## 2022-11-08 VITALS
SYSTOLIC BLOOD PRESSURE: 138 MMHG | OXYGEN SATURATION: 99 % | HEART RATE: 73 BPM | DIASTOLIC BLOOD PRESSURE: 68 MMHG | HEIGHT: 60 IN | RESPIRATION RATE: 18 BRPM | WEIGHT: 133 LBS | TEMPERATURE: 97.7 F | BODY MASS INDEX: 26.11 KG/M2

## 2022-11-08 PROCEDURE — G0299 HHS/HOSPICE OF RN EA 15 MIN: HCPCS

## 2023-01-12 ENCOUNTER — OFFICE VISIT (OUTPATIENT)
Dept: NEUROLOGY | Age: 78
End: 2023-01-12

## 2023-01-12 ENCOUNTER — APPOINTMENT (OUTPATIENT)
Dept: GENERAL RADIOLOGY | Facility: HOSPITAL | Age: 78
End: 2023-01-12
Payer: MEDICARE

## 2023-01-12 ENCOUNTER — HOSPITAL ENCOUNTER (OUTPATIENT)
Facility: HOSPITAL | Age: 78
Setting detail: OBSERVATION
LOS: 1 days | Discharge: HOME | End: 2023-01-14
Attending: EMERGENCY MEDICINE | Admitting: STUDENT IN AN ORGANIZED HEALTH CARE EDUCATION/TRAINING PROGRAM
Payer: MEDICARE

## 2023-01-12 VITALS
DIASTOLIC BLOOD PRESSURE: 79 MMHG | HEART RATE: 75 BPM | BODY MASS INDEX: 21.97 KG/M2 | SYSTOLIC BLOOD PRESSURE: 136 MMHG | HEIGHT: 67 IN | WEIGHT: 140 LBS

## 2023-01-12 DIAGNOSIS — G20 PARKINSON DISEASE (HCC): Primary | ICD-10-CM

## 2023-01-12 DIAGNOSIS — Z86.79 HISTORY OF HYPERTENSION: ICD-10-CM

## 2023-01-12 DIAGNOSIS — Z86.39 HISTORY OF HYPERLIPIDEMIA: ICD-10-CM

## 2023-01-12 DIAGNOSIS — R07.9 CHEST PAIN, UNSPECIFIED TYPE: Primary | ICD-10-CM

## 2023-01-12 PROBLEM — N18.30 CKD (CHRONIC KIDNEY DISEASE) STAGE 3, GFR 30-59 ML/MIN (HCC): Status: ACTIVE | Noted: 2023-01-12

## 2023-01-12 LAB
ALBUMIN SERPL-MCNC: 3.7 G/DL (ref 3.5–5.2)
ALBUMIN/GLOB SERPL: 1.4 G/DL
ALP SERPL-CCNC: 175 U/L (ref 39–117)
ALT SERPL W P-5'-P-CCNC: <5 U/L (ref 1–33)
ANION GAP SERPL CALCULATED.3IONS-SCNC: 9 MMOL/L (ref 5–15)
AST SERPL-CCNC: 17 U/L (ref 1–32)
BASOPHILS # BLD AUTO: 0.02 10*3/MM3 (ref 0–0.2)
BASOPHILS NFR BLD AUTO: 0.5 % (ref 0–1.5)
BILIRUB SERPL-MCNC: 0.3 MG/DL (ref 0–1.2)
BUN SERPL-MCNC: 29 MG/DL (ref 8–23)
BUN/CREAT SERPL: 24.6 (ref 7–25)
CALCIUM SPEC-SCNC: 8.9 MG/DL (ref 8.6–10.5)
CHLORIDE SERPL-SCNC: 103 MMOL/L (ref 98–107)
CO2 SERPL-SCNC: 30 MMOL/L (ref 22–29)
CREAT SERPL-MCNC: 1.18 MG/DL (ref 0.57–1)
DEPRECATED RDW RBC AUTO: 47.8 FL (ref 37–54)
EGFRCR SERPLBLD CKD-EPI 2021: 47.4 ML/MIN/1.73
EOSINOPHIL # BLD AUTO: 0.21 10*3/MM3 (ref 0–0.4)
EOSINOPHIL NFR BLD AUTO: 5.8 % (ref 0.3–6.2)
ERYTHROCYTE [DISTWIDTH] IN BLOOD BY AUTOMATED COUNT: 14.6 % (ref 12.3–15.4)
GLOBULIN UR ELPH-MCNC: 2.7 GM/DL
GLUCOSE SERPL-MCNC: 144 MG/DL (ref 65–99)
HCT VFR BLD AUTO: 36.9 % (ref 34–46.6)
HGB BLD-MCNC: 12.1 G/DL (ref 12–15.9)
HOLD SPECIMEN: NORMAL
HOLD SPECIMEN: NORMAL
IMM GRANULOCYTES # BLD AUTO: 0.01 10*3/MM3 (ref 0–0.05)
IMM GRANULOCYTES NFR BLD AUTO: 0.3 % (ref 0–0.5)
INR PPP: 2.26 (ref 0.8–1.2)
LIPASE SERPL-CCNC: 61 U/L (ref 13–60)
LYMPHOCYTES # BLD AUTO: 1.15 10*3/MM3 (ref 0.7–3.1)
LYMPHOCYTES NFR BLD AUTO: 31.5 % (ref 19.6–45.3)
MAGNESIUM SERPL-MCNC: 1.9 MG/DL (ref 1.6–2.4)
MCH RBC QN AUTO: 29.5 PG (ref 26.6–33)
MCHC RBC AUTO-ENTMCNC: 32.8 G/DL (ref 31.5–35.7)
MCV RBC AUTO: 90 FL (ref 79–97)
MONOCYTES # BLD AUTO: 0.69 10*3/MM3 (ref 0.1–0.9)
MONOCYTES NFR BLD AUTO: 18.9 % (ref 5–12)
NEUTROPHILS NFR BLD AUTO: 1.57 10*3/MM3 (ref 1.7–7)
NEUTROPHILS NFR BLD AUTO: 43 % (ref 42.7–76)
NRBC BLD AUTO-RTO: 0 /100 WBC (ref 0–0.2)
NT-PROBNP SERPL-MCNC: 1305 PG/ML (ref 0–1800)
PLATELET # BLD AUTO: 161 10*3/MM3 (ref 140–450)
PMV BLD AUTO: 10.5 FL (ref 6–12)
POTASSIUM SERPL-SCNC: 4 MMOL/L (ref 3.5–5.2)
PROT SERPL-MCNC: 6.4 G/DL (ref 6–8.5)
PROTHROMBIN TIME: 25 SECONDS (ref 11.1–15.3)
RBC # BLD AUTO: 4.1 10*6/MM3 (ref 3.77–5.28)
SODIUM SERPL-SCNC: 142 MMOL/L (ref 136–145)
TROPONIN T SERPL-MCNC: <0.01 NG/ML (ref 0–0.03)
TROPONIN T SERPL-MCNC: <0.01 NG/ML (ref 0–0.03)
WBC NRBC COR # BLD: 3.65 10*3/MM3 (ref 3.4–10.8)

## 2023-01-12 PROCEDURE — 93005 ELECTROCARDIOGRAM TRACING: CPT | Performed by: EMERGENCY MEDICINE

## 2023-01-12 PROCEDURE — 85025 COMPLETE CBC W/AUTO DIFF WBC: CPT | Performed by: EMERGENCY MEDICINE

## 2023-01-12 PROCEDURE — 84484 ASSAY OF TROPONIN QUANT: CPT | Performed by: EMERGENCY MEDICINE

## 2023-01-12 PROCEDURE — 83735 ASSAY OF MAGNESIUM: CPT | Performed by: EMERGENCY MEDICINE

## 2023-01-12 PROCEDURE — 83690 ASSAY OF LIPASE: CPT | Performed by: EMERGENCY MEDICINE

## 2023-01-12 PROCEDURE — 71046 X-RAY EXAM CHEST 2 VIEWS: CPT

## 2023-01-12 PROCEDURE — 93010 ELECTROCARDIOGRAM REPORT: CPT | Performed by: INTERNAL MEDICINE

## 2023-01-12 PROCEDURE — 80053 COMPREHEN METABOLIC PANEL: CPT | Performed by: EMERGENCY MEDICINE

## 2023-01-12 PROCEDURE — 83880 ASSAY OF NATRIURETIC PEPTIDE: CPT | Performed by: EMERGENCY MEDICINE

## 2023-01-12 PROCEDURE — 85610 PROTHROMBIN TIME: CPT | Performed by: EMERGENCY MEDICINE

## 2023-01-12 PROCEDURE — 99285 EMERGENCY DEPT VISIT HI MDM: CPT

## 2023-01-12 PROCEDURE — 93005 ELECTROCARDIOGRAM TRACING: CPT

## 2023-01-12 RX ORDER — NITROGLYCERIN 0.4 MG/1
0.4 TABLET SUBLINGUAL
Status: DISCONTINUED | OUTPATIENT
Start: 2023-01-12 | End: 2023-01-14 | Stop reason: HOSPADM

## 2023-01-12 RX ORDER — FERROUS SULFATE 325(65) MG
325 TABLET ORAL
COMMUNITY
Start: 2022-07-16

## 2023-01-12 RX ORDER — BACLOFEN 20 MG
1 TABLET ORAL DAILY
COMMUNITY
Start: 2022-11-10

## 2023-01-12 RX ORDER — ASPIRIN 81 MG/1
81 TABLET ORAL DAILY
Status: DISCONTINUED | OUTPATIENT
Start: 2023-01-13 | End: 2023-01-14 | Stop reason: HOSPADM

## 2023-01-12 RX ORDER — ONDANSETRON 2 MG/ML
4 INJECTION INTRAMUSCULAR; INTRAVENOUS EVERY 6 HOURS PRN
Status: DISCONTINUED | OUTPATIENT
Start: 2023-01-12 | End: 2023-01-14 | Stop reason: HOSPADM

## 2023-01-12 RX ORDER — LORATADINE 10 MG/1
TABLET ORAL
COMMUNITY
Start: 2022-12-28

## 2023-01-12 RX ORDER — GABAPENTIN 100 MG/1
100 CAPSULE ORAL NIGHTLY
Qty: 30 CAPSULE | Refills: 3 | Status: SHIPPED | OUTPATIENT
Start: 2023-01-12 | End: 2023-02-12

## 2023-01-12 NOTE — PROGRESS NOTES
Chief Complaint   Patient presents with    Follow-up     3mo follow up for Parkinson's Disease. Patient states her only new complaint is that she has started experiencing leg cramps/twitches that are bothersome for her. Lyric Stoddard is a 65y.o. year old female who is seen for evaluation of her tremor. She has about a 2 year history of tremor in the left hand and it is occasional noticed in her chin. She has decreased arm swing on the left but does have a history of an injury there remotely. There is no family history of tremor. The patient had a liver transplant in 2003 there has been no changes in her medications for several years. She denies any trouble with her gait per se. Otherwise any focal neurological difficulties. Old records are reviewed. When I saw her 11/19  I felt that she had Parkinson's disease. She was started on Sinemet . Overall doing very well with her tremors. She has hypertension and hyperlipidemia and those are controlled. She has worsening arthritis in her right knee which he has her difficulty getting into the standing position. She has been having occasional falls. No loss of consciousness. On a previous her Sinemet was increased from 2 pills 3 times a day to 4 times a day. However, she only takes it 3 times a day. Last year she had had a fall and fractured her wrist.  She was asked to increase her Sinemet to 4 times a day on a daily basis. Last seen here 3 months ago. No further falls. Has had some blood clots for which she is on warfarin now. Complaining of a burning pain in her bilateral thighs which is better when seated. Has a history of 10 low back surgeries.   Active Ambulatory Problems     Diagnosis Date Noted    No Active Ambulatory Problems     Resolved Ambulatory Problems     Diagnosis Date Noted    No Resolved Ambulatory Problems     Past Medical History:   Diagnosis Date    Congenital heart disease     Hyperlipidemia     Liver transplanted ([facility] [address] Past Surgical History:   Procedure Laterality Date    BACK SURGERY      x 10    BLEPHAROPLASTY Bilateral 11/27/2020    HYSTERECTOMY (CERVIX STATUS UNKNOWN)      LIVER TRANSPLANT      SHOULDER SURGERY         No family history on file.     Allergies   Allergen Reactions    Adhesive Tape Other (See Comments)     IV tape causes skin tears  Pt stated it breaks her out    Codeine     Hydrocodone-Acetaminophen     Penicillins        Social History     Socioeconomic History    Marital status:      Spouse name: Not on file    Number of children: Not on file    Years of education: Not on file    Highest education level: Not on file   Occupational History    Not on file   Tobacco Use    Smoking status: Never    Smokeless tobacco: Never   Vaping Use    Vaping Use: Never used   Substance and Sexual Activity    Alcohol use: No    Drug use: No    Sexual activity: Not on file   Other Topics Concern    Not on file   Social History Narrative    Not on file     Social Determinants of Health     Financial Resource Strain: Not on file   Food Insecurity: Not on file   Transportation Needs: Not on file   Physical Activity: Not on file   Stress: Not on file   Social Connections: Not on file   Intimate Partner Violence: Not on file   Housing Stability: Not on file       Review of Systems    Constitutional: []Fever []Sweats []Chills [] Recent Injury   [x] Denies all unless marked  HENT:[]Headache  [] Head Injury  [] Sore Throat  [] Ear Pain  [] Dizziness [] Hearing Loss   [x] Denies all unless marked  Spine:  [] Neck pain  [] Back pain  [] Sciatica  [x] Denies all unless marked  Cardiovascular:[]Chest Pain []Palpitations [] Heart Disease  [x] Denies all unless marked  Pulmonary: []Shortness of Breath []Cough   [x] Denies all unless marked  Gastrointestinal:  []Abdominal Pain  []Blood in Stool  []Diarrhea []Constipation []Nausea  []Vomiting  [x] Denies all unless marked  Genitourinary:  [] Dysuria [] Frequency  [] Incontinence [] Urgency   [x] Denies all unless marked  Musculoskeletal: [] Arthralgia  [] Myalgias [x] Muscle cramps  [x] Muscle twitches   [] Denies all unless marked   Extremities:   [] Pain   [] Swelling   [x] Denies all unless marked  Skin:[] Rash  [] Color Change  [x] Denies all unless marked  Neurological:[] Visual Disturbance [] Double Vision [] Slurred Speech [] Trouble swallowing  [] Vertigo [] Tingling [] Numbness [] Weakness [] Loss of Balance   [] Loss of Consciousness [] Memory Loss [] Seizures  [x] Denies all unless marked  Psychiatric/Behavioral:[] Depression [] Anxiety  [x] Denies all unless marked  Sleep: []  Insomnia [] Sleep Disturbance [] Snoring [] Restless Legs [] Daytime Sleepiness [] Sleep Apnea  [x] Denies all unless marked         Current Outpatient Medications   Medication Sig Dispense Refill    ferrous sulfate (IRON 325) 325 (65 Fe) MG tablet Take 325 mg by mouth daily (with breakfast)      loratadine (CLARITIN) 10 MG tablet TAKE 1 TABLET BY MOUTH ONCE A DAY      Magnesium Oxide 500 MG TABS Take 1 tablet by mouth daily      gabapentin (NEURONTIN) 100 MG capsule Take 1 capsule by mouth nightly for 31 days.  Max Daily Amount: 100 mg 30 capsule 3    carbidopa-levodopa (SINEMET)  MG per tablet Take 2 pills 3 times daily 240 tablet 5    warfarin (COUMADIN) 4 MG tablet Take 4 mg by mouth nightly      vitamin D (CHOLECALCIFEROL) 25 MCG (1000 UT) TABS tablet Take 1,000 Units by mouth daily      JANUVIA 25 MG tablet Take 1 tablet by mouth every morning      carvedilol (COREG) 12.5 MG tablet Take 12.5 mg by mouth 2 times daily (with meals)       isosorbide mononitrate (IMDUR) 120 MG extended release tablet Take 120 mg by mouth daily       sirolimus (RAPAMUNE) 1 MG tablet Take 1 mg by mouth daily      spironolactone (ALDACTONE) 25 MG tablet Take 25 mg by mouth daily       furosemide (LASIX) 40 MG tablet Take 40 mg by mouth 2 times daily      atorvastatin (LIPITOR) 40 MG tablet Take 40 mg by mouth daily No current facility-administered medications for this visit. /79   Pulse 75   Ht 5' 7\" (1.702 m)   Wt 140 lb (63.5 kg)   BMI 21.93 kg/m²       Constitutional - well developed, well nourished. Eyes - conjunctiva normal.  Pupils react to light  Ear, nose, throat -hearing intact to finger rub No scars, masses, or lesions over external nose or ears, no atrophy of tongue  Neck-symmetric, no masses noted, no jugular vein distension. No bruits noted. Respiration- chest wall appears symmetric, good expansion,   normal effort without use of accessory muscles  Cardiovascular- RRR  Musculoskeletal - no significant wasting of muscles noted, no bony deformities, gait no gross ataxia. Decreased range of motion of the left shoulder  Extremities-no clubbing, cyanosis or edema  Skin - warm, dry, and intact. No rash, erythema, or pallor. Psychiatric - mood, affect, and behavior appear normal.      Neurological exam  Awake, alert, fluent oriented x 3 appropriate affect  Attention and concentration appear appropriate  Recent and remote memory appears unremarkable  Speech normal without dysarthria  No clear issues with language of fund of knowledge    Cranial Nerve Exam   CN II- Visual fields grossly unremarkable. VA adequate. PERRLA. CN III, IV,VI-EOMI, No nystagmus, conjugate eye movements, no ptosis  CN VII-no facial asymmetry. Masked face  CN VIII-Hearing intact to finger rub  CN IX and X- Palate elevates in midline  CN XI-good shoulder shrug  CN XII-Tongue midline with no fasciculations or fibrillations    Motor Exam  V/V throughout upper and lower extremities bilaterally, no cogwheeling, normal tone      Reflexes   Trace  throughout    Tremors- Occasional resting tremor in the left hand. Chin tremor is noted distraction. There is cogwheeling and bradykinesia in the left upper extremity. Gait  Antalgic. Decreased arm swing. Some freezing noted. thoracic kyphosis.   Using a Rollator  Coordination  Finger to nose-unremarkable          Assessment    ICD-10-CM    1. Parkinson disease (HCC)  G20 gabapentin (NEURONTIN) 100 MG capsule     carbidopa-levodopa (SINEMET)  MG per tablet      2. History of hypertension  Z86.79       3. History of hyperlipidemia  Z86.39           Sinew Sinemet as is for now. It appears to be beneficial.  Hypertension hyperlipidemia are treated and stable. Try low-dose gabapentin at bedtime only for burning in the thighs  Plan    Return in about 3 months (around 4/12/2023).     (Please note that portions of this note were completed with a voice recognition program. Efforts were made to edit the dictations but occasionally words are mis-transcribed.)

## 2023-01-12 NOTE — Clinical Note
Level of Care: Telemetry [5]   Diagnosis: Chest pain, unspecified type [[id number]]   Admitting Physician: EDEN MORA [[id number]]   Attending Physician: EDEN MORA [[id number]]   Certification: I Certify That Inpatient Hospital Services Are Medically Necessary For Greater Than 2 Midnights

## 2023-01-12 NOTE — PROGRESS NOTES
REVIEW OF SYSTEMS    Constitutional: []Fever []Sweats []Chills [] Recent Injury   [x] Denies all unless marked  HENT:[]Headache  [] Head Injury  [] Sore Throat  [] Ear Pain  [] Dizziness [] Hearing Loss   [x] Denies all unless marked  Spine:  [] Neck pain  [] Back pain  [] Sciatica  [x] Denies all unless marked  Cardiovascular:[]Chest Pain []Palpitations [] Heart Disease  [x] Denies all unless marked  Pulmonary: []Shortness of Breath []Cough   [x] Denies all unless marked  Gastrointestinal:  []Abdominal Pain  []Blood in Stool  []Diarrhea []Constipation []Nausea  []Vomiting  [x] Denies all unless marked  Genitourinary:  [] Dysuria [] Frequency  [] Incontinence [] Urgency   [x] Denies all unless marked  Musculoskeletal: [] Arthralgia  [] Myalgias [x] Muscle cramps  [x] Muscle twitches   [] Denies all unless marked   Extremities:   [] Pain   [] Swelling   [x] Denies all unless marked  Skin:[] Rash  [] Color Change  [x] Denies all unless marked  Neurological:[] Visual Disturbance [] Double Vision [] Slurred Speech [] Trouble swallowing  [] Vertigo [] Tingling [] Numbness [] Weakness [] Loss of Balance   [] Loss of Consciousness [] Memory Loss [] Seizures  [x] Denies all unless marked  Psychiatric/Behavioral:[] Depression [] Anxiety  [x] Denies all unless marked  Sleep: []  Insomnia [] Sleep Disturbance [] Snoring [] Restless Legs [] Daytime Sleepiness [] Sleep Apnea  [x] Denies all unless marked

## 2023-01-13 LAB
ANION GAP SERPL CALCULATED.3IONS-SCNC: 11 MMOL/L (ref 5–15)
BUN SERPL-MCNC: 27 MG/DL (ref 8–23)
BUN/CREAT SERPL: 27.3 (ref 7–25)
CALCIUM SPEC-SCNC: 8.8 MG/DL (ref 8.6–10.5)
CHLORIDE SERPL-SCNC: 104 MMOL/L (ref 98–107)
CHOLEST SERPL-MCNC: 129 MG/DL (ref 0–200)
CO2 SERPL-SCNC: 26 MMOL/L (ref 22–29)
CREAT SERPL-MCNC: 0.99 MG/DL (ref 0.57–1)
DEPRECATED RDW RBC AUTO: 47.9 FL (ref 37–54)
EGFRCR SERPLBLD CKD-EPI 2021: 58.5 ML/MIN/1.73
ERYTHROCYTE [DISTWIDTH] IN BLOOD BY AUTOMATED COUNT: 14.6 % (ref 12.3–15.4)
GLUCOSE SERPL-MCNC: 114 MG/DL (ref 65–99)
HBA1C MFR BLD: 5.5 % (ref 4.8–5.6)
HCT VFR BLD AUTO: 36 % (ref 34–46.6)
HDLC SERPL-MCNC: 40 MG/DL (ref 40–60)
HGB BLD-MCNC: 11.7 G/DL (ref 12–15.9)
INR PPP: 2.19 (ref 0.8–1.2)
LDLC SERPL CALC-MCNC: 70 MG/DL (ref 0–100)
LDLC/HDLC SERPL: 1.73 {RATIO}
MCH RBC QN AUTO: 29.2 PG (ref 26.6–33)
MCHC RBC AUTO-ENTMCNC: 32.5 G/DL (ref 31.5–35.7)
MCV RBC AUTO: 89.8 FL (ref 79–97)
PLATELET # BLD AUTO: 143 10*3/MM3 (ref 140–450)
PMV BLD AUTO: 10.1 FL (ref 6–12)
POTASSIUM SERPL-SCNC: 3.9 MMOL/L (ref 3.5–5.2)
PROTHROMBIN TIME: 24.4 SECONDS (ref 11.1–15.3)
RBC # BLD AUTO: 4.01 10*6/MM3 (ref 3.77–5.28)
SODIUM SERPL-SCNC: 141 MMOL/L (ref 136–145)
TRIGL SERPL-MCNC: 100 MG/DL (ref 0–150)
TROPONIN T SERPL-MCNC: <0.01 NG/ML (ref 0–0.03)
VLDLC SERPL-MCNC: 19 MG/DL (ref 5–40)
WBC NRBC COR # BLD: 3.22 10*3/MM3 (ref 3.4–10.8)

## 2023-01-13 PROCEDURE — 84484 ASSAY OF TROPONIN QUANT: CPT

## 2023-01-13 PROCEDURE — 85610 PROTHROMBIN TIME: CPT

## 2023-01-13 PROCEDURE — 80048 BASIC METABOLIC PNL TOTAL CA: CPT

## 2023-01-13 PROCEDURE — 36415 COLL VENOUS BLD VENIPUNCTURE: CPT

## 2023-01-13 PROCEDURE — G0378 HOSPITAL OBSERVATION PER HR: HCPCS

## 2023-01-13 PROCEDURE — 80061 LIPID PANEL: CPT

## 2023-01-13 PROCEDURE — 82962 GLUCOSE BLOOD TEST: CPT

## 2023-01-13 PROCEDURE — 93005 ELECTROCARDIOGRAM TRACING: CPT

## 2023-01-13 PROCEDURE — 93010 ELECTROCARDIOGRAM REPORT: CPT | Performed by: INTERNAL MEDICINE

## 2023-01-13 PROCEDURE — 83036 HEMOGLOBIN GLYCOSYLATED A1C: CPT

## 2023-01-13 PROCEDURE — 85027 COMPLETE CBC AUTOMATED: CPT

## 2023-01-13 RX ORDER — ACETAMINOPHEN 325 MG/1
650 TABLET ORAL EVERY 6 HOURS PRN
Status: DISCONTINUED | OUTPATIENT
Start: 2023-01-13 | End: 2023-01-14 | Stop reason: HOSPADM

## 2023-01-13 RX ORDER — SODIUM CHLORIDE 9 MG/ML
40 INJECTION, SOLUTION INTRAVENOUS AS NEEDED
Status: DISCONTINUED | OUTPATIENT
Start: 2023-01-13 | End: 2023-01-14 | Stop reason: HOSPADM

## 2023-01-13 RX ORDER — FUROSEMIDE 40 MG/1
40 TABLET ORAL DAILY
Status: DISCONTINUED | OUTPATIENT
Start: 2023-01-13 | End: 2023-01-14 | Stop reason: HOSPADM

## 2023-01-13 RX ORDER — FERROUS SULFATE TAB EC 324 MG (65 MG FE EQUIVALENT) 324 (65 FE) MG
324 TABLET DELAYED RESPONSE ORAL
Status: DISCONTINUED | OUTPATIENT
Start: 2023-01-13 | End: 2023-01-14 | Stop reason: HOSPADM

## 2023-01-13 RX ORDER — CARVEDILOL 12.5 MG/1
12.5 TABLET ORAL 2 TIMES DAILY WITH MEALS
Status: DISCONTINUED | OUTPATIENT
Start: 2023-01-13 | End: 2023-01-13

## 2023-01-13 RX ORDER — ATORVASTATIN CALCIUM 20 MG/1
20 TABLET, FILM COATED ORAL NIGHTLY
Status: DISCONTINUED | OUTPATIENT
Start: 2023-01-13 | End: 2023-01-14 | Stop reason: HOSPADM

## 2023-01-13 RX ORDER — ISOSORBIDE MONONITRATE 60 MG/1
120 TABLET, EXTENDED RELEASE ORAL DAILY
Status: DISCONTINUED | OUTPATIENT
Start: 2023-01-13 | End: 2023-01-14 | Stop reason: HOSPADM

## 2023-01-13 RX ORDER — WARFARIN SODIUM 2 MG/1
2 TABLET ORAL
Status: DISCONTINUED | OUTPATIENT
Start: 2023-01-13 | End: 2023-01-13

## 2023-01-13 RX ORDER — SIROLIMUS 0.5 MG/1
0.5 TABLET, FILM COATED ORAL DAILY
Status: DISCONTINUED | OUTPATIENT
Start: 2023-01-14 | End: 2023-01-14 | Stop reason: HOSPADM

## 2023-01-13 RX ORDER — ATORVASTATIN CALCIUM 20 MG/1
20 TABLET, FILM COATED ORAL NIGHTLY
Status: DISCONTINUED | OUTPATIENT
Start: 2023-01-13 | End: 2023-01-13

## 2023-01-13 RX ORDER — SODIUM CHLORIDE 0.9 % (FLUSH) 0.9 %
10 SYRINGE (ML) INJECTION EVERY 12 HOURS SCHEDULED
Status: DISCONTINUED | OUTPATIENT
Start: 2023-01-13 | End: 2023-01-14 | Stop reason: HOSPADM

## 2023-01-13 RX ORDER — SPIRONOLACTONE 25 MG/1
25 TABLET ORAL EVERY MORNING
Status: DISCONTINUED | OUTPATIENT
Start: 2023-01-13 | End: 2023-01-14 | Stop reason: HOSPADM

## 2023-01-13 RX ORDER — SODIUM CHLORIDE 0.9 % (FLUSH) 0.9 %
10 SYRINGE (ML) INJECTION AS NEEDED
Status: DISCONTINUED | OUTPATIENT
Start: 2023-01-13 | End: 2023-01-14 | Stop reason: HOSPADM

## 2023-01-13 RX ORDER — WARFARIN SODIUM 4 MG/1
4 TABLET ORAL
Status: DISCONTINUED | OUTPATIENT
Start: 2023-01-13 | End: 2023-01-14 | Stop reason: HOSPADM

## 2023-01-13 RX ADMIN — WARFARIN SODIUM 4 MG: 4 TABLET ORAL at 17:16

## 2023-01-13 RX ADMIN — METOPROLOL TARTRATE 25 MG: 25 TABLET, FILM COATED ORAL at 20:29

## 2023-01-13 RX ADMIN — SPIRONOLACTONE 25 MG: 25 TABLET ORAL at 06:12

## 2023-01-13 RX ADMIN — Medication 10 ML: at 08:12

## 2023-01-13 RX ADMIN — FERROUS SULFATE TAB EC 324 MG (65 MG FE EQUIVALENT) 324 MG: 324 (65 FE) TABLET DELAYED RESPONSE at 08:12

## 2023-01-13 RX ADMIN — CARVEDILOL 12.5 MG: 12.5 TABLET, FILM COATED ORAL at 17:16

## 2023-01-13 RX ADMIN — CARBIDOPA AND LEVODOPA 1 TABLET: 25; 100 TABLET ORAL at 06:12

## 2023-01-13 RX ADMIN — CARBIDOPA AND LEVODOPA 1 TABLET: 25; 100 TABLET ORAL at 23:31

## 2023-01-13 RX ADMIN — ATORVASTATIN CALCIUM 20 MG: 20 TABLET, FILM COATED ORAL at 20:29

## 2023-01-13 RX ADMIN — Medication 10 ML: at 20:31

## 2023-01-13 RX ADMIN — ISOSORBIDE MONONITRATE 120 MG: 60 TABLET, EXTENDED RELEASE ORAL at 08:12

## 2023-01-13 RX ADMIN — CARBIDOPA AND LEVODOPA 1 TABLET: 25; 100 TABLET ORAL at 17:16

## 2023-01-13 RX ADMIN — CARBIDOPA AND LEVODOPA 1 TABLET: 25; 100 TABLET ORAL at 15:01

## 2023-01-13 RX ADMIN — CARVEDILOL 12.5 MG: 12.5 TABLET, FILM COATED ORAL at 08:12

## 2023-01-13 RX ADMIN — LINAGLIPTIN 5 MG: 5 TABLET, FILM COATED ORAL at 08:13

## 2023-01-13 RX ADMIN — FUROSEMIDE 40 MG: 40 TABLET ORAL at 08:12

## 2023-01-13 RX ADMIN — ASPIRIN 81 MG: 81 TABLET, FILM COATED ORAL at 08:12

## 2023-01-13 NOTE — PROGRESS NOTES
[facility] Emergency Room Medicine Services  INPATIENT PROGRESS NOTE    Length of Stay: 1  Date of Admission: 1/12/2023  Primary Care Physician: Pineda Morris APRN    Subjective   Chief Complaint: no complaints   HPI:  77 year old female with past medical history of CHF, chronic anemia, arthritis, HLD, GERD, HTN, PAF, TIA, liver transplant who presented on 1/12/23 with complaints of chest pain.  She is currently under observation for further workup and monitoring and is awaiting cardiology consult.  She currently denies chest pain but notes last night she had midsternal chest pain that radiated to left chest and also had diaphoresis, dyspnea, nausea.      Review of Systems   Constitutional: Negative for chills and fatigue.   HENT: Negative for congestion, rhinorrhea and sore throat.    Respiratory: Negative for cough, chest tightness, shortness of breath and wheezing.    Cardiovascular: Negative for chest pain, palpitations and leg swelling.   Gastrointestinal: Negative for abdominal pain, diarrhea, nausea and vomiting.   Musculoskeletal: Negative for back pain and neck pain.   Skin: Negative for pallor.   Neurological: Negative for dizziness, weakness and headaches.   Psychiatric/Behavioral: Negative for confusion. The patient is not nervous/anxious.         All pertinent negatives and positives are as above. All other systems have been reviewed and are negative unless otherwise stated.     Objective    Temp:  [97.6 °F (36.4 °C)-98.3 °F (36.8 °C)] 97.6 °F (36.4 °C)  Heart Rate:  [67-89] 67  Resp:  [16-18] 18  BP: (107-176)/(58-84) 130/69    Physical Exam  Vitals and nursing note reviewed.   Constitutional:       General: She is not in acute distress.     Appearance: Normal appearance. She is not ill-appearing.   HENT:      Head: Normocephalic and atraumatic.      Right Ear: External ear normal.      Left Ear: External ear normal.      Nose: Nose normal.      Mouth/Throat:      Mouth:  Mucous membranes are moist.      Pharynx: Oropharynx is clear.   Eyes:      General: No scleral icterus.        Right eye: No discharge.         Left eye: No discharge.      Conjunctiva/sclera: Conjunctivae normal.   Cardiovascular:      Rate and Rhythm: Normal rate and regular rhythm.      Heart sounds: Normal heart sounds. No murmur heard.    No friction rub. No gallop.   Pulmonary:      Effort: Pulmonary effort is normal. No respiratory distress.      Breath sounds: Normal breath sounds. No stridor. No wheezing, rhonchi or rales.   Abdominal:      General: Bowel sounds are normal. There is no distension.      Palpations: Abdomen is soft.   Musculoskeletal:         General: No swelling. Normal range of motion.      Cervical back: Normal range of motion and neck supple.   Skin:     General: Skin is warm and dry.   Neurological:      General: No focal deficit present.      Mental Status: She is alert and oriented to person, place, and time.   Psychiatric:         Mood and Affect: Mood normal.         Behavior: Behavior normal.             Results Review:  I have reviewed the labs, radiology results, and diagnostic studies.    Laboratory Data:   Results from last 7 days   Lab Units 01/13/23 0628 01/12/23 1933   SODIUM mmol/L 141 142   POTASSIUM mmol/L 3.9 4.0   CHLORIDE mmol/L 104 103   CO2 mmol/L 26.0 30.0*   BUN mg/dL 27* 29*   CREATININE mg/dL 0.99 1.18*   GLUCOSE mg/dL 114* 144*   CALCIUM mg/dL 8.8 8.9   BILIRUBIN mg/dL  --  0.3   ALK PHOS U/L  --  175*   ALT (SGPT) U/L  --  <5   AST (SGOT) U/L  --  17   ANION GAP mmol/L 11.0 9.0     Estimated Creatinine Clearance: 38 mL/min (by C-G formula based on SCr of 0.99 mg/dL).  Results from last 7 days   Lab Units 01/12/23  1933   MAGNESIUM mg/dL 1.9         Results from last 7 days   Lab Units 01/13/23 0628 01/12/23 1933   WBC 10*3/mm3 3.22* 3.65   HEMOGLOBIN g/dL 11.7* 12.1   HEMATOCRIT % 36.0 36.9   PLATELETS 10*3/mm3 143 161     Results from last 7 days   Lab  Units 01/13/23  0628 01/12/23 1933   INR  2.19* 2.26*       Culture Data:   No results found for: BLOODCX  No results found for: URINECX  No results found for: RESPCX  No results found for: WOUNDCX  No results found for: STOOLCX  No components found for: BODYFLD    Radiology Data:   Imaging Results (Last 24 Hours)     Procedure Component Value Units Date/Time    XR Chest 2 View [[id number]] Collected: 01/12/23 1948     Updated: 01/12/23 2024    Narrative:      XR CHEST 2 VIEWS    COMPARISONS: Two view chest dated November 14, 2014    ADDITIONAL PERTINENT HISTORY: Chest pain    FINDINGS:  Cardiomediastinal silhouette:  Negative.    Pulmonary vasculature:  Atherosclerotic disease of the thoracic  aortic arch.    Lung fields:  Hyperexpanded lung fields with background  interstitial scarring. No segmental consolidative process.    Pleural spaces: Negative.    Osseous structures:  Patient status post previous left shoulder  arthroplasty.    Surrounding soft tissues:  Negative.        Impression:      No acute cardiopulmonary disease.    Electronically signed by:  Geo Tompkins MD  1/12/2023 8:22 PM CST  Workstation: [id number]          I have reviewed the patient's current medications.     Assessment/Plan     Active Hospital Problems    Diagnosis    • **Chest pain, unspecified type    • CKD (chronic kidney disease) stage 3, GFR 30-59 ml/min ([facility])        Plan:    1. Chest pain r/o ACS: serial troponins are negative.  No acute findings on EKG or telemetry readings.  Continue telemetry monitoring.  Awaiting cardiology consult. Continue ASA, statin, Coreg, Imdur.    2. Atrial fibrillation: continue warfarin, Coreg.   3. Hx chronic CHF with preserved EF: no acute exacerbation.  Continue Coreg, Aldactone.  No ACE/ARB due to CKD.  Heart healthy diet.   4. Hx liver transplant: continue sirolimus at home dose.   5. Hx recurrent DVT: on warfarin therapy.    Medical Decision Making  Number and Complexity of problems: 5 - moderate  complexity  Differential Diagnosis:   Chest pain (CAD versus pulmonary etiology-low risk of PE due to chronic anticoagulation)    Conditions and Status:        Condition is improving.     [facility] Data  External documents reviewed: vital signs, labs including BMP, troponin, BNP, PT/INR, lipid panel, A1c, CBC  My EKG interpretation: NSR with LBBB  My plain film interpretation: CXR no acute findings.   Tests considered but not ordered: n/a      Decision rules/scores evaluated (example [id number], Wells, etc): n/a      Discussed with: patient, family at bedside, assigned RN-Becka    Care Planning  Code status and discussions: DNR/DNI code status on file.     Disposition  Social Determinants of Health that impact treatment or disposition: none identified.     I expect the patient to be discharged to home in 1-2 days.     This document has been electronically signed by ROZ Rosales on January 13, 2023 12:36 CST

## 2023-01-13 NOTE — ED PROVIDER NOTES
Subjective     Chest Pain  Pain location:  Substernal area  Pain quality: aching and pressure    Pain radiates to:  Does not radiate  Pain severity:  Moderate  Onset quality:  Sudden  Duration:  1 hour  Timing:  Constant  Progression:  Resolved  Chronicity:  New  Context: not breathing, not eating, not at rest and not trauma    Context comment:  Occurred while doing housework.  Relieved by: Aspirin and nitroglycerin given by paramedics.  Worsened by:  Exertion  Ineffective treatments:  Rest  Associated symptoms: diaphoresis, dizziness and nausea    Associated symptoms: no abdominal pain, no altered mental status, no anxiety, no back pain, no cough, no fever, no lower extremity edema, no palpitations, no shortness of breath and no vomiting        Review of Systems   Constitutional: Positive for diaphoresis. Negative for fever.   Respiratory: Negative for cough and shortness of breath.    Cardiovascular: Positive for chest pain. Negative for palpitations.   Gastrointestinal: Positive for nausea. Negative for abdominal pain and vomiting.   Musculoskeletal: Negative for back pain.   Neurological: Positive for dizziness.   All other systems reviewed and are negative.      Past Medical History:   Diagnosis Date   • Arthritis    • Backache    • Bilateral pleural effusion    • Blood in feces     Blood in feces symptom   • Capsulitis    • Cardiomyopathy (HCC)      HFpEF, improved      • Chest pain    • CHF (congestive heart failure) (HCC)    • Chronic anemia    • Chronic hepatitis C (HCC)    • Degenerative joint disease involving multiple joints    • Dilated cardiomyopathy (HCC)    • Dyslipidemia    • Dyspnea    • Edema of leg    • Epistaxis    • Essential hypertension    • Fibromyalgia, primary    • GERD (gastroesophageal reflux disease)    • H/O endoscopy 06/30/2014    Colon endoscopy [id number]   • Headache    • Hemorrhoids     internal & external   • History of liver recipient (HCC)     S/P done at [facility] 2003      •  Hyperlipemia    • Hyperlipidemia    • Hypertension    • Hypokalemia    • Left bundle branch block    • Metatarsalgia     Metatarsalgia - with fat pad atrophie      • Pap smear for cervical cancer screening 02/09/1996   • Paroxysmal atrial fibrillation (HCC)    • Polyp of sigmoid colon    • Salmonella arthritis (HCC)    • Transient cerebral ischemia     Unspecified       Allergies   Allergen Reactions   • Lortab [Hydrocodone-Acetaminophen] Other (See Comments)     Can cause problems for liver has had transplant    • Penicillins Anaphylaxis and Hives   • Adhesive Tape Other (See Comments)     IV tape causes skin tears  Pt stated it breaks her out   • Tape Other (See Comments)     IV tape causes skin tears  Pt stated it breaks her out   • Codeine Hives       Past Surgical History:   Procedure Laterality Date   • BACK SURGERY  1995    Back Surgery (2)      • CARDIAC CATHETERIZATION  11/03/2005    Orders Not Yet Performed: 0       • CARPAL TUNNEL RELEASE  02/12/2007    Carpal tunnel surgery (1)     • CAUTERIZATION NASAL BLEEDERS  03/09/2000    Control nose/throat bleeding (1)      • CHOLECYSTECTOMY  2000   • COLONOSCOPY N/A 8/14/2019    Procedure: COLONOSCOPY;  Surgeon: Chaparro Mckeon MD;  Location: [facility] ENDOSCOPY;  Service: Gastroenterology   • LAPAROSCOPIC LYSIS OF ADHESIONS  07/16/1992    Laparoscopy; lysis of adhesions (1)      • LIVER BIOPSY  01/05/1998    Needle biopsy of liver 78893 (1)      • LIVER TRANSPLANTATION  2003    Anesth, for liver transplant (1)      • SALPINGO OOPHORECTOMY  1974    Salpingo-oophorectomy (1)      • SHOULDER SURGERY  08/29/2007    Shoulder surgery procedure (1)      • TOTAL ABDOMINAL HYSTERECTOMY  1968    Total abd hysterectomy (1)          Family History   Problem Relation Age of Onset   • Heart attack Father    • Stroke Father    • Cancer Other         Other   • Heart disease Other    • Hypertension Other    • Cholelithiasis Other        Social History     Socioeconomic History   •  "Marital status: Single   Tobacco Use   • Smoking status: Never   • Smokeless tobacco: Never   Vaping Use   • Vaping Use: Never used   Substance and Sexual Activity   • Alcohol use: No   • Drug use: No   • Sexual activity: Defer           Objective   Physical Exam  Vitals and nursing note reviewed.   Constitutional:       Appearance: She is not ill-appearing.   HENT:      Head: Normocephalic.   Neck:      Vascular: No JVD.   Cardiovascular:      Rate and Rhythm: Normal rate and regular rhythm.      Pulses:           Radial pulses are 2+ on the right side and 2+ on the left side.      Heart sounds: Normal heart sounds.   Pulmonary:      Effort: Pulmonary effort is normal.      Breath sounds: Normal breath sounds.   Chest:      Chest wall: No tenderness.   Abdominal:      Palpations: Abdomen is soft.      Tenderness: There is no abdominal tenderness.   Musculoskeletal:      Right lower leg: No tenderness. No edema.      Left lower leg: No tenderness. No edema.   Skin:     General: Skin is warm and dry.   Neurological:      Mental Status: She is alert and oriented to person, place, and time.   Psychiatric:         Behavior: Behavior normal.         Procedures           ED Course  ED Course as of 01/13/23 0112   Thu Jan 12, 2023 2038 Lipase(!)  Very mild elevation not significantly changed from baseline value. [JV]   2038 Comprehensive Metabolic Panel(!)  Chronic kidney disease [JV]   2039 Protime-INR(!)  Therapeutic INR. [JV]      ED Course User Index  [JV] Chaparro Schulz DO                      HEART Score: 7                      Medical Decision Making  The patient's recent past medical charts for this facility as well as outside facilities via the \"care everywhere\" application of epic reviewed.    The differential diagnosis includes acute coronary syndrome, pulmonary embolism, pancreatitis, and pneumonia, among others.    On final reevaluation the patient is chest pain-free and vital signs are stable.  We " discussed admission and the patient agrees.    Chest pain, unspecified type: acute illness or injury with systemic symptoms  Amount and/or Complexity of Data Reviewed  Labs: ordered. Decision-making details documented in ED Course.  Radiology: ordered.  ECG/medicine tests: ordered and independent interpretation performed.     Details: EKG interpretation: Interpreted by myself.  Sinus rhythm with single PVC.  Rate 82.  Normal P wave and WA interval.  Left bundle branch block.  Left axis deviation.  ST segments are nonspecific.  Discussion of management or test interpretation with external provider(s): Case discussed with hospitalist service who agrees to admit the patient.  Case discussed with interventional cardiologist on-call regarding left bundle branch block and morphology changes.  Their initial recommendation is a repeat EKG as well as close monitoring and they will consult.  They do not anticipate immediate Cath Lab procedure.    Risk  OTC drugs.  Prescription drug management.  Decision regarding hospitalization.    Risk Details: Case discussed with hospitalist service who agrees to admit the patient.        Final diagnoses:   Chest pain, unspecified type       ED Disposition  ED Disposition     ED Disposition   Decision to Admit    Condition   --    Comment   Level of Care: Telemetry [5]   Diagnosis: Chest pain, unspecified type [[id number]]   Admitting Physician: EDEN MORA [[id number]]   Attending Physician: EDEN MORA [[id number]]   Certification: I Certify That Inpatient Hospital Services Are Medically Necessary For Greater Than 2 Midnights               No follow-up provider specified.       Medication List      No changes were made to your prescriptions during this visit.          Chaparro Schulz DO  01/13/23 0112

## 2023-01-13 NOTE — ED NOTES
Patient presents to the ED via EMS with c/o chest pain that radiates to the left side of her ribs since 530 this evening. EMS reports administering 324 mg aspirin and 0.4mg nitroglycerin en route; patient reports interventions have been effective and chest pain is resolves at this time.

## 2023-01-13 NOTE — PLAN OF CARE
Problem: Chest Pain  Goal: Resolution of Chest Pain Symptoms  1/13/2023 1750 by Becka Oscar RN  Outcome: Ongoing, Progressing  1/13/2023 1749 by Becka Oscar RN  Outcome: Ongoing, Progressing     Problem: Fall Injury Risk  Goal: Absence of Fall and Fall-Related Injury  1/13/2023 1750 by Becka Oscar RN  Outcome: Ongoing, Progressing  1/13/2023 1749 by Becka Oscar RN  Outcome: Ongoing, Progressing  Intervention: Promote Injury-Free Environment  Recent Flowsheet Documentation  Taken 1/13/2023 1559 by Becka Oscar RN  Safety Promotion/Fall Prevention:   activity supervised   assistive device/personal items within reach   clutter free environment maintained   fall prevention program maintained   lighting adjusted   mobility aid in reach   muscle strengthening facilitated   nonskid shoes/slippers when out of bed   room organization consistent   safety round/check completed     Problem: Skin Injury Risk Increased  Goal: Skin Health and Integrity  1/13/2023 1750 by Becka Oscar RN  Outcome: Ongoing, Progressing  1/13/2023 1749 by Becka Oscar RN  Outcome: Ongoing, Progressing  Intervention: Optimize Skin Protection  Recent Flowsheet Documentation  Taken 1/13/2023 1559 by Becka Oscar RN  Pressure Reduction Techniques:   frequent weight shift encouraged   weight shift assistance provided  Pressure Reduction Devices: specialty bed utilized  Skin Protection: incontinence pads utilized     Problem: Pain Acute  Goal: Acceptable Pain Control and Functional Ability  Outcome: Ongoing, Progressing  Intervention: Optimize Psychosocial Wellbeing  Recent Flowsheet Documentation  Taken 1/13/2023 1559 by Becka Oscar RN  Diversional Activities: television     Problem: Adult Inpatient Plan of Care  Goal: Plan of Care Review  Outcome: Ongoing, Progressing  Flowsheets (Taken 1/13/2023 1750)  Progress: no change  Plan of Care Reviewed With: patient  Outcome Evaluation: New admit, pt denies chest pain, inquiring about  test results, pt verbalized understanding that so far testing is WNL and Anastacia has discussed this with her, waiting for Dr. Mcclure at this time .  Goal: Patient-Specific Goal (Individualized)  Outcome: Ongoing, Progressing  Goal: Absence of Hospital-Acquired Illness or Injury  Outcome: Ongoing, Progressing  Intervention: Identify and Manage Fall Risk  Recent Flowsheet Documentation  Taken 1/13/2023 1559 by Becka Oscar RN  Safety Promotion/Fall Prevention:   activity supervised   assistive device/personal items within reach   clutter free environment maintained   fall prevention program maintained   lighting adjusted   mobility aid in reach   muscle strengthening facilitated   nonskid shoes/slippers when out of bed   room organization consistent   safety round/check completed  Intervention: Prevent Skin Injury  Recent Flowsheet Documentation  Taken 1/13/2023 1559 by Becka Oscar RN  Skin Protection: incontinence pads utilized  Intervention: Prevent and Manage VTE (Venous Thromboembolism) Risk  Recent Flowsheet Documentation  Taken 1/13/2023 1559 by Becka Oscar RN  Activity Management: activity adjusted per tolerance  Goal: Optimal Comfort and Wellbeing  Outcome: Ongoing, Progressing  Intervention: Provide Person-Centered Care  Recent Flowsheet Documentation  Taken 1/13/2023 1559 by Becka Oscar RN  Trust Relationship/Rapport:   care explained   choices provided  Goal: Readiness for Transition of Care  Outcome: Ongoing, Progressing   Goal Outcome Evaluation:  Plan of Care Reviewed With: patient        Progress: no change  Outcome Evaluation: New admit, pt denies chest pain, inquiring about test results, pt verbalized understanding that so far testing is WNL and Anastacia has discussed this with her, waiting for Dr. Mcclure at this time .

## 2023-01-13 NOTE — H&P
[facility] Medicine Admission      Date of Admission: 1/12/2023      Primary Care Physician: Pineda Morris APRN    Chief Complaint:   Chest pain    HPI:  Patient developed chest pain at approximately 5 PM today lasted 40 minutes until the ambulance arrived and they administered both nitroglycerin and aspirin.  Patient has had only intermittent episodes of mild pain since, although the initial episode was quite severe and associated with nausea, dizziness, and diaphoresis as well as some dyspnea.  Patient denies any other cardiorespiratory symptoms on review.  Patient states that the pain did radiate into her left arm from her left chest and was a pressure.  There were no other relieving or exacerbating factors.    The patient sees Dr. Jade as an outpatient for heart failure, atrial fibrillation, LVH, essential hypertension, and hyperlipidemia.    Patient's blood work is remarkable for a glucose of 144, creatinine 1.18 (which is her baseline), alkaline phosphatase 175, lipase 61, and INR 2.26 (she is on warfarin for atrial fibrillation).      Concurrent Medical History:  has a past medical history of Arthritis, Backache, Bilateral pleural effusion, Blood in feces, Capsulitis, Cardiomyopathy (HCC), Chest pain, CHF (congestive heart failure) (HCC), Chronic anemia, Chronic hepatitis C (HCC), Degenerative joint disease involving multiple joints, Dilated cardiomyopathy (HCC), Dyslipidemia, Dyspnea, Edema of leg, Epistaxis, Essential hypertension, Fibromyalgia, primary, GERD (gastroesophageal reflux disease), H/O endoscopy (06/30/2014), Headache, Hemorrhoids, History of liver recipient (HCC), Hyperlipemia, Hyperlipidemia, Hypertension, Hypokalemia, Left bundle branch block, Metatarsalgia, Pap smear for cervical cancer screening (02/09/1996), Paroxysmal atrial fibrillation (HCC), Polyp of sigmoid colon, Salmonella arthritis (HCC), and Transient cerebral ischemia.    Past Surgical  History:  has a past surgical history that includes Liver transplantation (2003); Back surgery (1995); Cardiac catheterization (11/03/2005); Carpal tunnel release (02/12/2007); Cholecystectomy (2000); Nasal hemorrhage control (03/09/2000); laparoscopic lysis of adhesions (07/16/1992); Liver biopsy (01/05/1998); Salpingoophorectomy (1974); Shoulder surgery (08/29/2007); Total abdominal hysterectomy (1968); and Colonoscopy (N/A, 8/14/2019).    Family History: family history includes Cancer in an other family member; Cholelithiasis in an other family member; Heart attack in her father; Heart disease in an other family member; Hypertension in an other family member; Stroke in her father.     Social History:  reports that she has never smoked. She has never used smokeless tobacco. She reports that she does not drink alcohol and does not use drugs.    Allergies:   Allergies   Allergen Reactions   • Lortab [Hydrocodone-Acetaminophen] Other (See Comments)     Can cause problems for liver has had transplant    • Penicillins Anaphylaxis and Hives   • Adhesive Tape Other (See Comments)     IV tape causes skin tears  Pt stated it breaks her out   • Tape Other (See Comments)     IV tape causes skin tears  Pt stated it breaks her out   • Codeine Hives       Medications:   Prior to Admission medications    Medication Sig Start Date End Date Taking? Authorizing Provider   acetaminophen (TYLENOL) 325 MG tablet Take 650 mg by mouth Every 6 (Six) Hours As Needed for Fever, Headache or Mild Pain. Indications: Fever, Pain 9/29/22   Yvette Rueda MD   atorvastatin (LIPITOR) 40 MG tablet Take 20 mg by mouth Every Night. Indications: High Amount of Fats in the Blood    ProviderYvette MD   carbidopa-levodopa (SINEMET)  MG per tablet 4 (Four) Times a Day. Indications: Restless Leg Syndrome 5/8/17   Yvette Rueda MD   carvedilol (COREG) 25 MG tablet Take 12.5 mg by mouth 2 (Two) Times a Day. Indications: Cardiac  Failure    Yvette Rueda MD   FeroSul 325 (65 Fe) MG tablet Take 325 mg by mouth Daily With Breakfast. Indications: Iron Deficiency, Restless Leg Syndrome 7/16/22   Yvette Rueda MD   furosemide (LASIX) 40 MG tablet Take 40 mg by mouth Daily. Indications: Cardiac Failure    Yvette Rueda MD   isosorbide mononitrate (IMDUR) 120 MG 24 hr tablet Take 120 mg by mouth Daily. Indications: Stable Angina Pectoris    Yvette Rueda MD   Januvia 25 MG tablet Take 25 mg by mouth 4 (Four) Times a Week. Take Monday, Wednesday, Friday and saturday  Indications: Type 2 Diabetes 1/31/22   Yvette Rueda MD   Loratadine (CLARITIN PO) Take 10 mg by mouth Daily. Indications: seasonal allergies 10/11/22   Yvette Rueda MD   magnesium gluconate (MAGONATE) 500 MG tablet Take  by mouth. Indications: supplement    Yvette Rueda MD   sirolimus (RAPAMUNE) 1 MG tablet Take 1 tablet by mouth.    Yvette Rueda MD   spironolactone (ALDACTONE) 50 MG tablet Take 25 mg by mouth Every Morning. Indications: Edema, High Blood Pressure Disorder    ProviderYvette MD   vitamin D3 125 MCG (5000 UT) capsule capsule Take 2,000 Units by mouth Daily. Indications: Osteoporosis    Yvette Rueda MD   warfarin (COUMADIN) 4 MG tablet Take 1 tablet by mouth Every Night. Indications: DVT/PE (active thrombosis)  Patient taking differently: Take 2 mg by mouth Every Night. 2mg 10/24/2022  Indications: DVT/PE (active thrombosis) 9/28/22   Gregg Zaragoza MD       Review of Systems:  Review of Systems   Constitutional: Positive for activity change, diaphoresis and fatigue. Negative for chills and fever.   HENT: Negative for congestion, ear pain, rhinorrhea, sinus pressure, sneezing and sore throat.    Respiratory: Positive for shortness of breath. Negative for cough, wheezing and stridor.    Cardiovascular: Positive for chest pain. Negative for palpitations and leg swelling.    Gastrointestinal: Positive for nausea. Negative for abdominal distention, abdominal pain, constipation, diarrhea and vomiting.   Genitourinary: Negative for difficulty urinating, dysuria, frequency, hematuria and urgency.   Musculoskeletal: Negative for arthralgias, joint swelling and myalgias.   Skin: Negative for color change, rash and wound.   Neurological: Positive for dizziness. Negative for syncope, light-headedness and headaches.   Psychiatric/Behavioral: Negative for agitation, confusion and hallucinations. The patient is nervous/anxious.       Otherwise complete ROS is negative except as mentioned above.    Physical Exam:   Temp:  [98.3 °F (36.8 °C)] 98.3 °F (36.8 °C)  Heart Rate:  [74-89] 74  Resp:  [16-18] 18  BP: (107-176)/(58-84) 119/58  Physical Exam  Vitals and nursing note reviewed.   Constitutional:       General: She is not in acute distress.     Appearance: She is normal weight. She is ill-appearing. She is not toxic-appearing or diaphoretic.   HENT:      Head: Normocephalic and atraumatic.      Nose: Nose normal. No congestion or rhinorrhea.      Mouth/Throat:      Mouth: Mucous membranes are dry.      Pharynx: Oropharynx is clear. No oropharyngeal exudate or posterior oropharyngeal erythema.   Eyes:      General:         Right eye: No discharge.         Left eye: No discharge.      Extraocular Movements: Extraocular movements intact.      Conjunctiva/sclera: Conjunctivae normal.      Pupils: Pupils are equal, round, and reactive to light.   Cardiovascular:      Rate and Rhythm: Normal rate and regular rhythm.      Pulses: Normal pulses.      Heart sounds: Normal heart sounds. No murmur heard.    No friction rub. No gallop.   Pulmonary:      Effort: Pulmonary effort is normal. No respiratory distress.      Breath sounds: Normal breath sounds. No stridor. No wheezing, rhonchi or rales.   Abdominal:      General: Abdomen is flat. Bowel sounds are normal. There is no distension.      Palpations:  Abdomen is soft. There is no mass.      Tenderness: There is no abdominal tenderness.      Hernia: No hernia is present.   Musculoskeletal:         General: No swelling.      Right lower leg: No edema.      Left lower leg: No edema.   Skin:     Capillary Refill: Capillary refill takes less than 2 seconds.      Coloration: Skin is pale. Skin is not jaundiced.      Findings: No bruising, erythema or rash.   Neurological:      General: No focal deficit present.      Mental Status: She is alert and oriented to person, place, and time. Mental status is at baseline.      Cranial Nerves: No cranial nerve deficit.   Psychiatric:         Behavior: Behavior normal.         Thought Content: Thought content normal.         Judgment: Judgment normal.         Results Reviewed:  I have personally reviewed current lab, radiology, and data and agree with results.  Lab Results (last 24 hours)     Procedure Component Value Units Date/Time    Extra Tubes [[id number]] Collected: 01/12/23 1933    Specimen: Blood, Venous Line Updated: 01/12/23 2345    Narrative:      The following orders were created for panel order Extra Tubes.  Procedure                               Abnormality         Status                     ---------                               -----------         ------                     Gold Top - SST[[id number]]                                   Final result               Howell Top[[id number]]                                         Final result                 Please view results for these tests on the individual orders.    Gray Top [[id number]] Collected: 01/12/23 1933    Specimen: Blood Updated: 01/12/23 2345     Extra Tube Hold for add-ons.     Comment: Auto resulted.       Troponin [[id number]]  (Normal) Collected: 01/12/23 2125    Specimen: Blood Updated: 01/12/23 2150     Troponin T <0.010 ng/mL     Narrative:      Troponin T Reference Range:  <= 0.03 ng/mL-   Negative for AMI  >0.03 ng/mL-     Abnormal for myocardial  necrosis.  Clinicians would have to utilize clinical acumen, EKG, Troponin and serial changes to determine if it is an Acute Myocardial Infarction or myocardial injury due to an underlying chronic condition.       Results may be falsely decreased if patient taking Biotin.      Gold Top - SST [[id number]] Collected: 01/12/23 1933    Specimen: Blood Updated: 01/12/23 2046     Extra Tube Hold for add-ons.     Comment: Auto resulted.       Protime-INR [[id number]]  (Abnormal) Collected: 01/12/23 1933    Specimen: Blood Updated: 01/12/23 2023     Protime 25.0 Seconds      INR 2.26    Narrative:      Therapeutic range for most indications is 2.0-3.0 INR,  or 2.5-3.5 for mechanical heart valves.    Comprehensive Metabolic Panel [[id number]]  (Abnormal) Collected: 01/12/23 1933    Specimen: Blood Updated: 01/12/23 2007     Glucose 144 mg/dL      BUN 29 mg/dL      Creatinine 1.18 mg/dL      Sodium 142 mmol/L      Potassium 4.0 mmol/L      Chloride 103 mmol/L      CO2 30.0 mmol/L      Calcium 8.9 mg/dL      Total Protein 6.4 g/dL      Albumin 3.7 g/dL      ALT (SGPT) <5 U/L      AST (SGOT) 17 U/L      Alkaline Phosphatase 175 U/L      Total Bilirubin 0.3 mg/dL      Globulin 2.7 gm/dL      A/G Ratio 1.4 g/dL      BUN/Creatinine Ratio 24.6     Anion Gap 9.0 mmol/L      eGFR 47.4 mL/min/1.73      Comment: National Kidney Foundation and American Society of Nephrology (ASN) Task Force recommended calculation based on the Chronic Kidney Disease Epidemiology Collaboration (CKD-EPI) equation refit without adjustment for race.       Narrative:      GFR Normal >60  Chronic Kidney Disease <60  Kidney Failure <15    The GFR formula is only valid for adults with stable renal function between ages 18 and 70.    Lipase [[id number]]  (Abnormal) Collected: 01/12/23 1933    Specimen: Blood Updated: 01/12/23 2007     Lipase 61 U/L     Troponin [[id number]]  (Normal) Collected: 01/12/23 1933    Specimen: Blood Updated: 01/12/23 2007     Troponin T  <0.010 ng/mL     Narrative:      Troponin T Reference Range:  <= 0.03 ng/mL-   Negative for AMI  >0.03 ng/mL-     Abnormal for myocardial necrosis.  Clinicians would have to utilize clinical acumen, EKG, Troponin and serial changes to determine if it is an Acute Myocardial Infarction or myocardial injury due to an underlying chronic condition.       Results may be falsely decreased if patient taking Biotin.      Magnesium [[id number]]  (Normal) Collected: 01/12/23 1933    Specimen: Blood Updated: 01/12/23 2007     Magnesium 1.9 mg/dL     BNP [[id number]]  (Normal) Collected: 01/12/23 1933    Specimen: Blood Updated: 01/12/23 2005     proBNP 1,305.0 pg/mL     Narrative:      Among patients with dyspnea, NT-proBNP is highly sensitive for the detection of acute congestive heart failure. In addition NT-proBNP of <300 pg/ml effectively rules out acute congestive heart failure with 99% negative predictive value.      CBC & Differential [[id number]]  (Abnormal) Collected: 01/12/23 1933    Specimen: Blood Updated: 01/12/23 1945    Narrative:      The following orders were created for panel order CBC & Differential.  Procedure                               Abnormality         Status                     ---------                               -----------         ------                     CBC Auto Differential[[id number]]        Abnormal            Final result                 Please view results for these tests on the individual orders.    CBC Auto Differential [[id number]]  (Abnormal) Collected: 01/12/23 1933    Specimen: Blood Updated: 01/12/23 1945     WBC 3.65 10*3/mm3      RBC 4.10 10*6/mm3      Hemoglobin 12.1 g/dL      Hematocrit 36.9 %      MCV 90.0 fL      MCH 29.5 pg      MCHC 32.8 g/dL      RDW 14.6 %      RDW-SD 47.8 fl      MPV 10.5 fL      Platelets 161 10*3/mm3      Neutrophil % 43.0 %      Lymphocyte % 31.5 %      Monocyte % 18.9 %      Eosinophil % 5.8 %      Basophil % 0.5 %      Immature Grans % 0.3 %       Neutrophils, Absolute 1.57 10*3/mm3      Lymphocytes, Absolute 1.15 10*3/mm3      Monocytes, Absolute 0.69 10*3/mm3      Eosinophils, Absolute 0.21 10*3/mm3      Basophils, Absolute 0.02 10*3/mm3      Immature Grans, Absolute 0.01 10*3/mm3      nRBC 0.0 /100 WBC         Imaging Results (Last 24 Hours)     Procedure Component Value Units Date/Time    XR Chest 2 View [[id number]] Collected: 01/12/23 1948     Updated: 01/12/23 2024    Narrative:      XR CHEST 2 VIEWS    COMPARISONS: Two view chest dated November 14, 2014    ADDITIONAL PERTINENT HISTORY: Chest pain    FINDINGS:  Cardiomediastinal silhouette:  Negative.    Pulmonary vasculature:  Atherosclerotic disease of the thoracic  aortic arch.    Lung fields:  Hyperexpanded lung fields with background  interstitial scarring. No segmental consolidative process.    Pleural spaces: Negative.    Osseous structures:  Patient status post previous left shoulder  arthroplasty.    Surrounding soft tissues:  Negative.        Impression:      No acute cardiopulmonary disease.    Electronically signed by:  Geo Tompkins MD  1/12/2023 8:22 PM CST  Workstation: [id number]            Assessment:    Active Hospital Problems    Diagnosis    • **Chest pain, unspecified type    • CKD (chronic kidney disease) stage 3, GFR 30-59 ml/min ([facility])          Medical Decision Making  Number and Complexity of problems: 2    Differential Diagnosis:   - ACS - moderate  - CKD - low    Conditions and Status:        Condition is at treatment goal.     [facility] Data  External documents reviewed: Nil  My EKG interpretation: Left bundle branch block with T wave inversion in aVL and lead I.  My plain film interpretation: No acute cardiopulmonary pathology  Tests considered but not ordered: Nil     Decision rules/scores evaluated (example [id number], Wells, etc):   - Heart score     Discussed with: The patient     Treatment Plan  1.  Cardiology consultation has already been agreed with Dr. Mcclure and he is  already provided some advice on ECG interpretation  2.  Serial ECGs and troponins  3.  Daily blood work  4.  Warfarin for VTE prophylaxis and pharmacy to dose based on INR  5.  Home medication reconciliation      Care Planning  Shared decision making: Has been undertaken with the patient  Code status and discussions: Full code.  The patient has 2 sons, Rob and Cele (who is the POA).    Disposition  Social Determinants of Health that impact treatment or disposition: Nil  I expect the patient to be discharged to home in 2-3 days.       Deepak Ledezma MD    Electronically signed by Deepak Ledezma MD, 01/13/23, 4:02 AM CST.

## 2023-01-13 NOTE — PROGRESS NOTES
"Anticoagulation by Pharmacy - Warfarin    Dora Velazquez is a 77 y.o.female who has been consulted for warfarin for history of DVT/PE/atrial fibrillation.     Home regimen: Warfarin 4 mg PO nightly per med rec and patient/sitter on 1/13/2023   INR Goal: 2-3    Objective:  [Ht: 152.4 cm (60\"); Wt: 60.3 kg (133 lb)]    Lab Results   Component Value Date    INR 2.19 (H) 01/13/2023    INR 2.26 (H) 01/12/2023    INR 4.94 (H) 10/18/2022    PROTIME 24.4 (H) 01/13/2023    PROTIME 25.0 (H) 01/12/2023    PROTIME 46.6 (H) 10/18/2022     Lab Results   Component Value Date    HGB 11.7 (L) 01/13/2023    HGB 12.1 01/12/2023    HGB 11.6 (L) 09/27/2022    HCT 36.0 01/13/2023    HCT 36.9 01/12/2023    HCT 34.6 09/27/2022     01/13/2023     01/12/2023     09/27/2022           Assessment  • H/H/plts WNL. No signs or symptoms of bleeding noted.   • Interacting medications: aspirin  • INR is therapeutic. Will continue home dose.     Plan:  1. Give warfarin 4 mg PO @ 1800 tonight.  2. PT/INR ordered daily.   3. Pharmacy will continue to follow    Thank you for this consult.     Piero Ferreira [facility]  01/13/23 10:28 CST     "

## 2023-01-14 ENCOUNTER — READMISSION MANAGEMENT (OUTPATIENT)
Dept: CALL CENTER | Facility: HOSPITAL | Age: 78
End: 2023-01-14
Payer: MEDICARE

## 2023-01-14 ENCOUNTER — APPOINTMENT (OUTPATIENT)
Dept: CT IMAGING | Facility: HOSPITAL | Age: 78
End: 2023-01-14
Payer: MEDICARE

## 2023-01-14 VITALS
WEIGHT: 132.8 LBS | HEIGHT: 60 IN | BODY MASS INDEX: 26.07 KG/M2 | SYSTOLIC BLOOD PRESSURE: 113 MMHG | RESPIRATION RATE: 18 BRPM | DIASTOLIC BLOOD PRESSURE: 63 MMHG | TEMPERATURE: 98.6 F | HEART RATE: 85 BPM | OXYGEN SATURATION: 98 %

## 2023-01-14 LAB
GLUCOSE BLDC GLUCOMTR-MCNC: 103 MG/DL (ref 70–130)
GLUCOSE BLDC GLUCOMTR-MCNC: 129 MG/DL (ref 70–130)
GLUCOSE BLDC GLUCOMTR-MCNC: 130 MG/DL (ref 70–130)
GLUCOSE BLDC GLUCOMTR-MCNC: 183 MG/DL (ref 70–130)
HOLD SPECIMEN: NORMAL
INR PPP: 2.3 (ref 0.8–1.2)
PROTHROMBIN TIME: 25.4 SECONDS (ref 11.1–15.3)
WHOLE BLOOD HOLD SPECIMEN: NORMAL

## 2023-01-14 PROCEDURE — G0378 HOSPITAL OBSERVATION PER HR: HCPCS

## 2023-01-14 PROCEDURE — 96374 THER/PROPH/DIAG INJ IV PUSH: CPT

## 2023-01-14 PROCEDURE — 82962 GLUCOSE BLOOD TEST: CPT

## 2023-01-14 PROCEDURE — 75574 CT ANGIO HRT W/3D IMAGE: CPT

## 2023-01-14 PROCEDURE — 85610 PROTHROMBIN TIME: CPT

## 2023-01-14 PROCEDURE — 63710000001 SIROLIMUS 0.5 MG TABLET

## 2023-01-14 PROCEDURE — 0 IOPAMIDOL PER 1 ML

## 2023-01-14 RX ORDER — METOPROLOL TARTRATE 5 MG/5ML
INJECTION INTRAVENOUS
Status: COMPLETED
Start: 2023-01-14 | End: 2023-01-14

## 2023-01-14 RX ADMIN — CARBIDOPA AND LEVODOPA 1 TABLET: 25; 100 TABLET ORAL at 11:50

## 2023-01-14 RX ADMIN — ISOSORBIDE MONONITRATE 120 MG: 60 TABLET, EXTENDED RELEASE ORAL at 09:44

## 2023-01-14 RX ADMIN — LINAGLIPTIN 5 MG: 5 TABLET, FILM COATED ORAL at 09:44

## 2023-01-14 RX ADMIN — FUROSEMIDE 40 MG: 40 TABLET ORAL at 09:44

## 2023-01-14 RX ADMIN — METOPROLOL TARTRATE 25 MG: 25 TABLET, FILM COATED ORAL at 09:44

## 2023-01-14 RX ADMIN — METOPROLOL TARTRATE 5 MG: 5 INJECTION INTRAVENOUS at 08:54

## 2023-01-14 RX ADMIN — IOPAMIDOL 90 ML: 755 INJECTION, SOLUTION INTRAVENOUS at 09:06

## 2023-01-14 RX ADMIN — SPIRONOLACTONE 25 MG: 25 TABLET ORAL at 06:06

## 2023-01-14 RX ADMIN — METOPROLOL TARTRATE 5 MG: 5 INJECTION INTRAVENOUS at 07:48

## 2023-01-14 RX ADMIN — METOPROLOL TARTRATE 5 MG: 5 INJECTION INTRAVENOUS at 08:04

## 2023-01-14 RX ADMIN — ASPIRIN 81 MG: 81 TABLET, FILM COATED ORAL at 09:44

## 2023-01-14 RX ADMIN — CARBIDOPA AND LEVODOPA 1 TABLET: 25; 100 TABLET ORAL at 06:06

## 2023-01-14 RX ADMIN — FERROUS SULFATE TAB EC 324 MG (65 MG FE EQUIVALENT) 324 MG: 324 (65 FE) TABLET DELAYED RESPONSE at 09:44

## 2023-01-14 RX ADMIN — Medication 10 ML: at 11:50

## 2023-01-14 RX ADMIN — SIROLIMUS 0.5 MG: 0.5 TABLET, SUGAR COATED ORAL at 09:44

## 2023-01-14 NOTE — PLAN OF CARE
Problem: Chest Pain  Goal: Resolution of Chest Pain Symptoms  Outcome: Ongoing, Progressing     Problem: Chest Pain  Goal: Resolution of Chest Pain Symptoms  Intervention: Manage Acute Chest Pain  Recent Flowsheet Documentation  Taken 1/13/2023 2000 by Humberto Wilson RN  Chest Pain Intervention: cardiac monitoring continued     Problem: Fall Injury Risk  Goal: Absence of Fall and Fall-Related Injury  Outcome: Ongoing, Progressing     Problem: Fall Injury Risk  Goal: Absence of Fall and Fall-Related Injury  Intervention: Identify and Manage Contributors  Recent Flowsheet Documentation  Taken 1/14/2023 0000 by Humberto Wilson RN  Medication Review/Management: medications reviewed  Taken 1/13/2023 2200 by Humberto Wilson RN  Medication Review/Management: medications reviewed  Taken 1/13/2023 2100 by Humberto Wilson RN  Medication Review/Management: medications reviewed  Taken 1/13/2023 2000 by Humberto Wilson RN  Medication Review/Management: medications reviewed  Taken 1/13/2023 1900 by Humberto Wilson RN  Medication Review/Management: medications reviewed     Problem: Fall Injury Risk  Goal: Absence of Fall and Fall-Related Injury  Intervention: Promote Injury-Free Environment  Recent Flowsheet Documentation  Taken 1/14/2023 0000 by Humberto Wilson RN  Safety Promotion/Fall Prevention:   safety round/check completed   nonskid shoes/slippers when out of bed   clutter free environment maintained   assistive device/personal items within reach  Taken 1/13/2023 2200 by Humberto Wilson RN  Safety Promotion/Fall Prevention:   assistive device/personal items within reach   clutter free environment maintained   nonskid shoes/slippers when out of bed   safety round/check completed  Taken 1/13/2023 2100 by Humberto Wilson RN  Safety Promotion/Fall Prevention:   assistive device/personal items within reach   clutter free environment maintained   nonskid shoes/slippers when out of bed   safety round/check  completed  Taken 1/13/2023 2000 by Humberto Wilson RN  Safety Promotion/Fall Prevention:   safety round/check completed   nonskid shoes/slippers when out of bed   clutter free environment maintained   assistive device/personal items within reach  Taken 1/13/2023 1900 by Humberto Wilson RN  Safety Promotion/Fall Prevention:   assistive device/personal items within reach   clutter free environment maintained   nonskid shoes/slippers when out of bed   safety round/check completed     Problem: Skin Injury Risk Increased  Goal: Skin Health and Integrity  Outcome: Ongoing, Progressing     Problem: Skin Injury Risk Increased  Goal: Skin Health and Integrity  Intervention: Optimize Skin Protection  Recent Flowsheet Documentation  Taken 1/14/2023 0000 by Humberto Wilson RN  Pressure Reduction Techniques: frequent weight shift encouraged  Head of Bed (HOB) Positioning: HOB at 20-30 degrees  Pressure Reduction Devices: specialty bed utilized  Skin Protection: incontinence pads utilized  Taken 1/13/2023 2200 by Humberto Wilson RN  Pressure Reduction Techniques: frequent weight shift encouraged  Head of Bed (HOB) Positioning: HOB at 20-30 degrees  Pressure Reduction Devices: specialty bed utilized  Skin Protection: incontinence pads utilized  Taken 1/13/2023 2100 by Humberto Wilson RN  Pressure Reduction Techniques: frequent weight shift encouraged  Head of Bed (HOB) Positioning: HOB at 20-30 degrees  Pressure Reduction Devices: specialty bed utilized  Skin Protection: incontinence pads utilized  Taken 1/13/2023 2000 by Humberto Wilson RN  Pressure Reduction Techniques: frequent weight shift encouraged  Head of Bed (HOB) Positioning: HOB at 20-30 degrees  Pressure Reduction Devices: specialty bed utilized  Skin Protection: incontinence pads utilized  Taken 1/13/2023 1900 by Humberto Wilson RN  Pressure Reduction Techniques: frequent weight shift encouraged  Head of Bed (HOB) Positioning: HOB at 20-30 degrees  Pressure  Reduction Devices: specialty bed utilized  Skin Protection: incontinence pads utilized  Taken 1/13/2023 1800 by Humberto Wilson RN  Pressure Reduction Techniques: frequent weight shift encouraged  Pressure Reduction Devices: specialty bed utilized  Skin Protection: incontinence pads utilized     Problem: Pain Acute  Goal: Acceptable Pain Control and Functional Ability  Outcome: Ongoing, Progressing     Problem: Pain Acute  Goal: Acceptable Pain Control and Functional Ability  Intervention: Prevent or Manage Pain  Recent Flowsheet Documentation  Taken 1/14/2023 0000 by Humberto Wilson RN  Medication Review/Management: medications reviewed  Taken 1/13/2023 2200 by Humberto Wilson RN  Medication Review/Management: medications reviewed  Taken 1/13/2023 2100 by Humberto Wilson RN  Medication Review/Management: medications reviewed  Taken 1/13/2023 2000 by Humberto Wilson RN  Sleep/Rest Enhancement: awakenings minimized  Medication Review/Management: medications reviewed  Taken 1/13/2023 1900 by Humberto Wilson RN  Medication Review/Management: medications reviewed     Problem: Pain Acute  Goal: Acceptable Pain Control and Functional Ability  Intervention: Optimize Psychosocial Wellbeing  Recent Flowsheet Documentation  Taken 1/13/2023 2000 by Humberto Wilson RN  Supportive Measures: active listening utilized  Diversional Activities: television     Problem: Adult Inpatient Plan of Care  Goal: Plan of Care Review  Outcome: Ongoing, Progressing  Flowsheets (Taken 1/13/2023 1750 by Becka Oscar RN)  Progress: no change  Plan of Care Reviewed With: patient     Problem: Adult Inpatient Plan of Care  Goal: Patient-Specific Goal (Individualized)  Outcome: Ongoing, Progressing     Problem: Adult Inpatient Plan of Care  Goal: Absence of Hospital-Acquired Illness or Injury  Outcome: Ongoing, Progressing     Problem: Adult Inpatient Plan of Care  Goal: Absence of Hospital-Acquired Illness or Injury  Intervention: Identify  and Manage Fall Risk  Flowsheets  Taken 1/14/2023 0000  Safety Promotion/Fall Prevention:   safety round/check completed   nonskid shoes/slippers when out of bed   clutter free environment maintained   assistive device/personal items within reach  Taken 1/13/2023 2200  Safety Promotion/Fall Prevention:   assistive device/personal items within reach   clutter free environment maintained   nonskid shoes/slippers when out of bed   safety round/check completed  Taken 1/13/2023 2100  Safety Promotion/Fall Prevention:   assistive device/personal items within reach   clutter free environment maintained   nonskid shoes/slippers when out of bed   safety round/check completed  Taken 1/13/2023 2000  Safety Promotion/Fall Prevention:   safety round/check completed   nonskid shoes/slippers when out of bed   clutter free environment maintained   assistive device/personal items within reach  Taken 1/13/2023 1900  Safety Promotion/Fall Prevention:   assistive device/personal items within reach   clutter free environment maintained   nonskid shoes/slippers when out of bed   safety round/check completed     Problem: Adult Inpatient Plan of Care  Goal: Absence of Hospital-Acquired Illness or Injury  Intervention: Prevent Skin Injury  Flowsheets  Taken 1/14/2023 0000  Body Position:   turned   right  Skin Protection: incontinence pads utilized  Taken 1/13/2023 2200  Body Position:   turned   left  Skin Protection: incontinence pads utilized  Taken 1/13/2023 2100  Body Position:   turned   right  Skin Protection: incontinence pads utilized  Taken 1/13/2023 2000  Body Position:   turned   right  Skin Protection: incontinence pads utilized  Taken 1/13/2023 1900  Body Position: sitting up in bed  Skin Protection: incontinence pads utilized  Taken 1/13/2023 1800  Skin Protection: incontinence pads utilized     Problem: Adult Inpatient Plan of Care  Goal: Absence of Hospital-Acquired Illness or Injury  Intervention: Prevent and Manage VTE  (Venous Thromboembolism) Risk  Flowsheets  Taken 1/14/2023 0000  Activity Management: activity adjusted per tolerance  VTE Prevention/Management: (pt is taking warfarin) --  Taken 1/13/2023 2200  Activity Management: activity adjusted per tolerance  VTE Prevention/Management: (pt is taking warfarin) --  Taken 1/13/2023 2100  Activity Management: activity adjusted per tolerance  VTE Prevention/Management: (pt is taking warfarin) --  Taken 1/13/2023 2000  Activity Management: activity adjusted per tolerance  VTE Prevention/Management: (pt is taking warfarin) --  Taken 1/13/2023 1900  Activity Management: activity adjusted per tolerance  VTE Prevention/Management: (pt is taking warfarin) --  Taken 1/13/2023 1800  VTE Prevention/Management: (pt is taking warfarin) --     Problem: Adult Inpatient Plan of Care  Goal: Absence of Hospital-Acquired Illness or Injury  Intervention: Prevent Infection  Flowsheets  Taken 1/14/2023 0000  Infection Prevention: rest/sleep promoted  Taken 1/13/2023 2200  Infection Prevention: rest/sleep promoted  Taken 1/13/2023 2100  Infection Prevention: rest/sleep promoted  Taken 1/13/2023 2000  Infection Prevention: rest/sleep promoted  Taken 1/13/2023 1900  Infection Prevention: rest/sleep promoted     Problem: Adult Inpatient Plan of Care  Goal: Optimal Comfort and Wellbeing  Outcome: Ongoing, Progressing     Problem: Adult Inpatient Plan of Care  Goal: Optimal Comfort and Wellbeing  Intervention: Provide Person-Centered Care  Flowsheets (Taken 1/13/2023 2000)  Trust Relationship/Rapport:   care explained   questions encouraged     Problem: Adult Inpatient Plan of Care  Goal: Readiness for Transition of Care  Outcome: Ongoing, Progressing     Problem: Adult Inpatient Plan of Care  Goal: Readiness for Transition of Care  Intervention: Mutually Develop Transition Plan  Flowsheets  Taken 1/13/2023 0932 by Parris Mojica, RN  Transportation Anticipated: family or friend will  provide  Patient/Family Anticipated Services at Transition: none  Patient/Family Anticipates Transition to: (Pt has a sitter during days) home  Taken 1/13/2023 0928 by Parris Mojica, RN  Equipment Currently Used at Home:   cane, straight   walker, rolling   wheelchair   Goal Outcome Evaluation:

## 2023-01-14 NOTE — CONSULTS
Cardiology Consultation Note.        Patient Name: Dora Velazquez  Age/Sex: 77 y.o. female  : 1945  MRN: [id number]    Date of consultation: 2023  Consulting Physician: Vickey Mcclure MD  Primary care Physician: Pineda Morris APRN  Requesting Physician:   Deepak Ledezma MD     Reason for consultation: Chest pain left bundle branch block paroxysmal atrial fibrillation      Subjective:       Chief Complaint: Chest pain    History of Present Illness:  Dora Velazquez is a 77 y.o. female     Body mass index is 25.97 kg/m².  With a past medical history significant for paroxysmal atrial fibrillation on chronic anticoagulation with Eliquis, baseline resting electrocardiogram with a left bundle branch block, arterial hypertension, hypertensive heart disease, hyperlipidemia, chronic heart failure with preserved ejection fraction, hyperlipidemia, chronic kidney disease, history of gastrointestinal bleeding, history of liver transplant.    Patient had presented to the hospital with symptoms of chest pain.  Patient does have a history of paroxysmal atrial fibrillation with episodes of labile uncontrolled arterial hypertension.  Patient has been on chronic anticoagulation with Coumadin.  Patient on questioning currently is not having any symptoms of severe substernal chest pain.    Patient troponin was negative.  Patient PT/INR was 2.26.    Patient on further questioning denies any nausea vomiting.  Patient denies any paroxysmal nocturnal dyspnea orthopnea.  Patient denies any episodes of any bleeding diathesis.    Patient 10 point review of system except for what stated in the history of present illness and negative.    Past Medical History:  Past Medical History:   Diagnosis Date   • Arthritis    • Backache    • Bilateral pleural effusion    • Blood in feces     Blood in feces symptom   • Capsulitis    • Cardiomyopathy (HCC)      HFpEF, improved      • Chest pain    • CHF (congestive heart failure)  (HCC)    • Chronic anemia    • Chronic hepatitis C (HCC)    • Degenerative joint disease involving multiple joints    • Dilated cardiomyopathy (HCC)    • Dyslipidemia    • Dyspnea    • Edema of leg    • Epistaxis    • Essential hypertension    • Fibromyalgia, primary    • GERD (gastroesophageal reflux disease)    • H/O endoscopy 06/30/2014    Colon endoscopy [id number]   • Headache    • Hemorrhoids     internal & external   • History of liver recipient (HCC)     S[facility]      • Hyperlipemia    • Hyperlipidemia    • Hypertension    • Hypokalemia    • Left bundle branch block    • Metatarsalgia     Metatarsalgia - with fat pad atrophie      • Pap smear for cervical cancer screening 02/09/1996   • Paroxysmal atrial fibrillation (HCC)    • Polyp of sigmoid colon    • Salmonella arthritis (HCC)    • Transient cerebral ischemia     Unspecified       Past Surgical History:  Past Surgical History:   Procedure Laterality Date   • BACK SURGERY  1995    Back Surgery (2)      • CARDIAC CATHETERIZATION  11/03/2005    Orders Not Yet Performed: 0       • CARPAL TUNNEL RELEASE  02/12/2007    Carpal tunnel surgery (1)     • CAUTERIZATION NASAL BLEEDERS  03/09/2000    Control nose/throat bleeding (1)      • CHOLECYSTECTOMY  2000   • COLONOSCOPY N/A 8/14/2019    Procedure: COLONOSCOPY;  Surgeon: Chaparro Mckeon MD;  Location: [facility] ENDOSCOPY;  Service: Gastroenterology   • LAPAROSCOPIC LYSIS OF ADHESIONS  07/16/1992    Laparoscopy; lysis of adhesions (1)      • LIVER BIOPSY  01/05/1998    Needle biopsy of liver [id number] (1)      • LIVER TRANSPLANTATION  2003    Anesth, for liver transplant (1)      • SALPINGO OOPHORECTOMY  1974    Salpingo-oophorectomy (1)      • SHOULDER SURGERY  08/29/2007    Shoulder surgery procedure (1)      • TOTAL ABDOMINAL HYSTERECTOMY  1968    Total abd hysterectomy (1)          Family History:  Family History   Problem Relation Age of Onset   • Heart attack Father    • Stroke Father    •  Cancer Other         Other   • Heart disease Other    • Hypertension Other    • Cholelithiasis Other        Social History:  Social History     Socioeconomic History   • Marital status: Single   Tobacco Use   • Smoking status: Never   • Smokeless tobacco: Never   Vaping Use   • Vaping Use: Never used   Substance and Sexual Activity   • Alcohol use: No   • Drug use: No   • Sexual activity: Defer              Allergies:  Allergies   Allergen Reactions   • Lortab [Hydrocodone-Acetaminophen] Other (See Comments)     Can cause problems for liver has had transplant    • Penicillins Anaphylaxis and Hives   • Adhesive Tape Other (See Comments)     IV tape causes skin tears  Pt stated it breaks her out   • Tape Other (See Comments)     IV tape causes skin tears  Pt stated it breaks her out   • Codeine Hives       Medication:  Medications Prior to Admission   Medication Sig Dispense Refill Last Dose   • acetaminophen (TYLENOL) 325 MG tablet Take 650 mg by mouth Every 6 (Six) Hours As Needed for Fever, Headache or Mild Pain. Indications: Fever, Pain   1/12/2023   • atorvastatin (LIPITOR) 40 MG tablet Take 20 mg by mouth Every Night. Indications: High Amount of Fats in the Blood   Past Week   • carbidopa-levodopa (SINEMET)  MG per tablet 4 (Four) Times a Day. Indications: Restless Leg Syndrome   1/13/2023   • carvedilol (COREG) 25 MG tablet Take 12.5 mg by mouth 2 (Two) Times a Day. Indications: Cardiac Failure   1/13/2023   • FeroSul 325 (65 Fe) MG tablet Take 325 mg by mouth Daily With Breakfast. Indications: Iron Deficiency, Restless Leg Syndrome   1/13/2023   • furosemide (LASIX) 40 MG tablet Take 40 mg by mouth Daily. Indications: Cardiac Failure   1/13/2023   • isosorbide mononitrate (IMDUR) 120 MG 24 hr tablet Take 120 mg by mouth Daily. Indications: Stable Angina Pectoris   1/13/2023   • Januvia 25 MG tablet Take 25 mg by mouth 4 (Four) Times a Week. Take Monday, Wednesday, Friday and saturday  Indications: Type 2  Diabetes   1/13/2023   • Loratadine (CLARITIN PO) Take 10 mg by mouth Daily. Indications: seasonal allergies   Past Week   • magnesium gluconate (MAGONATE) 500 MG tablet Take  by mouth. Indications: supplement   Past Month   • sirolimus (RAPAMUNE) 0.5 MG tablet tablet Take 0.5 mg by mouth Daily.   1/12/2023   • spironolactone (ALDACTONE) 50 MG tablet Take 25 mg by mouth Every Morning. Indications: Edema, High Blood Pressure Disorder   1/12/2023   • vitamin D3 125 MCG (5000 UT) capsule capsule Take 2,000 Units by mouth Daily. Indications: Osteoporosis   1/12/2023   • warfarin (COUMADIN) 4 MG tablet Take 1 tablet by mouth Every Night. Indications: DVT/PE (active thrombosis) 30 tablet 2 1/12/2023           Review of Systems:       Constitutional:  Denies recent weight loss, weight gain, fever or chills, no change in exercise tolerance.     HENT:  Denies any hearing loss, epistaxis, hoarseness, or difficulty speaking.     Eyes: Wears eyeglasses or contact lenses     Respiratory:  Denies dyspnea with exertion,no cough, wheezing, or hemoptysis.     Cardiovascular: Positive for chest pain.  Negative for palpitations,  orthopnea, PND, peripheral edema, syncope, or claudication.     Gastrointestinal:  Denies change in bowel habits, dyspepsia, ulcer disease, hematochezia, or melena.  No nausea, no vomiting, no hematemesis, no diarrhea or constipation.    Endocrine: Negative for cold intolerance, heat intolerance, polydipsia, polyphagia or polyuria. Denies any history of weight change or unintended weight loss.    Genitourinary: Negative for hematuria.  No frequent urination or nocturia.      Musculoskeletal: Denies any history of arthritic symptoms or back problems .  No joint pain, joint stiffness, joint swelling, muscle pain, muscle weakness or neck pain.    Skin:  Denies any change in hair or nails, rashes, or skin lesions.     Allergic/Immunologic: Negative.  Negative for environmental allergies, food allergies or  immunocompromised state.     Neurological:  Denies any history of recurrent headaches, strokes, TIA, or seizure disorder.     Hematological: Denies excessive bleeding, easy bruising, fatigue, lymphadenopathy or petechiae or any bleeding disorders.     Psychiatric/Behavioral: Denies any history of depression, substance abuse, or change in cognitive function. Denies any psychomotor reaction or tangential thought.  No depression, homicidal ideations or suicidal ideations.          Objective:     Objective:  Temp:  [97.3 °F (36.3 °C)-99.1 °F (37.3 °C)] 97.3 °F (36.3 °C)  Heart Rate:  [67-84] 76  Resp:  [16-18] 18  BP: (107-164)/(58-81) 136/63      Body mass index is 25.97 kg/m².           Physical Exam:   General Appearance:    Alert, oriented, cooperative, in no acute distress.   Head:    Normocephalic, atraumatic, without obvious abnormality.   Eyes:           GIANNI.  Lids and lashes normal, conjunctivae and sclerae normal, no icterus, no pallor.   Ears:    Ears appear intact with no abnormalities noted.   Throat:   Mucous membranes pink and moist.   Neck:   Supple, trachea midline, no carotid bruit, no organomegaly or JVD.   Lungs:     Clear to auscultation and percussion, respirations regular, even and unlabored. No wheezes, rales or rhonchi.    Heart:    Regular rhythm and normal rate, normal S1 and S2, no murmur, no gallop, no rub, no click.   Abdomen:     Soft, nontender, nondistended, no guarding, no rebound tenderness, normal bowel sounds in all four quadrants, no masses, liver and spleen nonpalpable.   Genitalia:    Deferred.   Extremities:   Moves all extremities well, no edema, no cyanosis, no  redness, no clubbing.   Pulses:   Pulses palpable and equal bilaterally.   Skin:   Moist and warm. No bleeding, bruising or rash.   Neurologic/Psychiatric:   Alert and oriented to person, place, and time.  Motor, power and tone in upper and lower extremities are grossly intact. No focal neurological deficits. Normal  cognitive function. No psychomotor reaction or tangential thought. No depression, homicidal ideations and suicidal ideations.       Medication Review:   Current Facility-Administered Medications   Medication Dose Route Frequency Provider Last Rate Last Admin   • acetaminophen (TYLENOL) tablet 650 mg  650 mg Oral Q6H PRN Deepak Ledezma MD       • aspirin EC tablet 81 mg  81 mg Oral Daily Deepak Ledezma MD   81 mg at 01/13/23 0812   • atorvastatin (LIPITOR) tablet 20 mg  20 mg Oral Nightly Deepak Ledezma MD       • carbidopa-levodopa (SINEMET)  MG per tablet 1 tablet  1 tablet Oral Q6H Deepak Ledezma MD   1 tablet at 01/13/23 1716   • carvedilol (COREG) tablet 12.5 mg  12.5 mg Oral BID With Meals Deepak Ledezma MD   12.5 mg at 01/13/23 1716   • ferrous sulfate EC tablet 324 mg  324 mg Oral Daily With Breakfast Deepak Ledezma MD   324 mg at 01/13/23 0812   • furosemide (LASIX) tablet 40 mg  40 mg Oral Daily Deepak Ledezma MD   40 mg at 01/13/23 0812   • isosorbide mononitrate (IMDUR) 24 hr tablet 120 mg  120 mg Oral Daily Deepak Ledezma MD   120 mg at 01/13/23 0812   • linagliptin (TRADJENTA) tablet 5 mg  5 mg Oral Once per day on Mon Wed Fri Sat Deepak Ledezma MD   5 mg at 01/13/23 0813   • nitroglycerin (NITROSTAT) SL tablet 0.4 mg  0.4 mg Sublingual Q5 Min PRN Deepak Ledezma MD       • ondansetron (ZOFRAN) injection 4 mg  4 mg Intravenous Q6H PRN Deepak Ledezma MD       • Pharmacy to dose warfarin   Does not apply Continuous PRN Deepak Ledezma MD       • [START ON 1/14/2023] sirolimus (RAPAMUNE) tablet 0.5 mg  0.5 mg Oral Daily Deepak Ledezma MD       • sodium chloride 0.9 % flush 10 mL  10 mL Intravenous Q12H Deepak Ledezma MD   10 mL at 01/13/23 0812   • sodium chloride 0.9 % flush 10 mL  10 mL Intravenous PRN Deepak Ledezma MD       • sodium chloride 0.9 % infusion 40 mL  40 mL Intravenous  Deepak Marie MD       • spironolactone (ALDACTONE) tablet 25 mg  25 mg Oral QAM Deepak Ledezma MD   25 mg at 01/13/23 0612   • warfarin (COUMADIN) tablet 4 mg  4 mg Oral Daily Deepak Ledezma MD   4 mg at 01/13/23 1716       Lab Review:     Results from last 7 days   Lab Units 01/13/23 0628 01/12/23 1933   SODIUM mmol/L 141 142   POTASSIUM mmol/L 3.9 4.0   CHLORIDE mmol/L 104 103   CO2 mmol/L 26.0 30.0*   BUN mg/dL 27* 29*   CREATININE mg/dL 0.99 1.18*   CALCIUM mg/dL 8.8 8.9   BILIRUBIN mg/dL  --  0.3   ALK PHOS U/L  --  175*   ALT (SGPT) U/L  --  <5   AST (SGOT) U/L  --  17   GLUCOSE mg/dL 114* 144*     Results from last 7 days   Lab Units 01/13/23 0628 01/12/23 2125 01/12/23 1933   TROPONIN T ng/mL <0.010 <0.010 <0.010         Results from last 7 days   Lab Units 01/13/23 0628   WBC 10*3/mm3 3.22*   HEMOGLOBIN g/dL 11.7*   HEMATOCRIT % 36.0   PLATELETS 10*3/mm3 143     Results from last 7 days   Lab Units 01/13/23 0628 01/12/23 1933   INR  2.19* 2.26*     Results from last 7 days   Lab Units 01/12/23 1933   MAGNESIUM mg/dL 1.9     Results from last 7 days   Lab Units 01/13/23 0628   CHOLESTEROL mg/dL 129   TRIGLYCERIDES mg/dL 100   HDL CHOL mg/dL 40   LDL CHOL mg/dL 70               EKG:   ECG/EMG Results (last 24 hours)     Procedure Component Value Units Date/Time    ECG 12 Lead Chest Pain [[id number]] Collected: 01/12/23 1915     Updated: 01/12/23 2009     QT Interval 426 ms      QTC Interval 497 ms     Narrative:      Test Reason : CP  Blood Pressure :   */*   mmHG  Vent. Rate :  82 BPM     Atrial Rate :  82 BPM     P-R Int : 198 ms          QRS Dur : 144 ms      QT Int : 426 ms       P-R-T Axes :  48 -21 108 degrees     QTc Int : 497 ms    Sinus rhythm with occasional Premature ventricular complexes  Left bundle branch block  Abnormal ECG  When compared with ECG of 09-AUG-2022 11:46,  Premature ventricular complexes are now Present  Questionable change in QRS axis  T  wave inversion more evident in Lateral leads    Referred By:            Confirmed By:     ECG 12 Lead Chest Pain [[id number]] Collected: 01/13/23 0537     Updated: 01/13/23 0641     QT Interval 446 ms      QTC Interval 504 ms     Narrative:      Test Reason : Chest Pain  Blood Pressure :   */*   mmHG  Vent. Rate :  77 BPM     Atrial Rate :  77 BPM     P-R Int : 188 ms          QRS Dur : 150 ms      QT Int : 446 ms       P-R-T Axes : 107 -29 104 degrees     QTc Int : 504 ms    Normal sinus rhythm  Left bundle branch block  Abnormal ECG  When compared with ECG of 13-JAN-2023 00:59,  No significant change was found    Referred By:            Confirmed By:     ECG 12 Lead Chest Pain [[id number]] Collected: 01/13/23 0059     Updated: 01/13/23 0641     QT Interval 472 ms      QTC Interval 527 ms     Narrative:      Test Reason : Chest Pain  Blood Pressure :   */*   mmHG  Vent. Rate :  75 BPM     Atrial Rate :  75 BPM     P-R Int : 194 ms          QRS Dur : 148 ms      QT Int : 472 ms       P-R-T Axes : 105 -69  97 degrees     QTc Int : 527 ms    Normal sinus rhythm  Left axis deviation  Left bundle branch block  Abnormal ECG  When compared with ECG of 12-JAN-2023 19:15,  Premature ventricular complexes are no longer Present    Referred By:            Confirmed By:           ECHO:  Results for orders placed in visit on 09/15/22    Adult Transthoracic Echo Complete W/ Cont if Necessary Per Protocol    Interpretation Summary  · Calculated left ventricular 3D EF = 46% Estimated left ventricular EF = 45% Left ventricular ejection fraction appears to be 41 - 45%. Left ventricular systolic function is low normal.  · The left ventricular cavity is borderline dilated.  · Left ventricular diastolic dysfunction is noted.  · Left atrial volume is severely increased.  · Estimated right ventricular systolic pressure from tricuspid regurgitation is normal (<35 mmHg).       Imaging:  Imaging Results (Last 24 Hours)     Procedure Component  Value Units Date/Time    XR Chest 2 View [[id number]] Collected: 01/12/23 1948     Updated: 01/12/23 2024    Narrative:      XR CHEST 2 VIEWS    COMPARISONS: Two view chest dated November 14, 2014    ADDITIONAL PERTINENT HISTORY: Chest pain    FINDINGS:  Cardiomediastinal silhouette:  Negative.    Pulmonary vasculature:  Atherosclerotic disease of the thoracic  aortic arch.    Lung fields:  Hyperexpanded lung fields with background  interstitial scarring. No segmental consolidative process.    Pleural spaces: Negative.    Osseous structures:  Patient status post previous left shoulder  arthroplasty.    Surrounding soft tissues:  Negative.        Impression:      No acute cardiopulmonary disease.    Electronically signed by:  Geo Tompkins MD  1/12/2023 8:22 PM CST  Workstation: [id number]          I personally viewed and interpreted the patient's EKG/Telemetry data.    Assessment:   1.  Chest pain.  2.  Atrial fibrillation.  3.  Arterial hypertension.  4.  Hypertensive heart disease with left ventricular systolic dysfunction with an ejection fraction of 45%.  5.  Chronic anticoagulation with Coumadin.          Plan:   1.  Chest pain.  Patient has symptoms of substernal chest discomfort.  Patient had negative troponin.  Patient has not had any further recurrence of chest discomfort since hospitalization.  Patient baseline resting electrocardiogram had reveal left bundle branch block.  Patient would be subjected to a CTA of the coronaries rather than a coronary angiogram.  Discussed at length with the son.  Patient will be started on isosorbide 30 mg once a day.    2.  Paroxysmal atrial fibrillation.  Patient has had previous evaluation by Dr. Jade.  Patient is on anticoagulation with Coumadin.  Patient has not had any hemoptysis hematuria by the blood per rectum.    3.  Shortness of breath with left ventricular systolic dysfunction with an ejection fraction of 45%.  Clinically at the present time patient not in  congestive heart failure.    4.  Baseline resting electrocardiogram with a left bundle branch block is an old finding.    5.  History of liver transplant for cirrhosis of the liver.  Patient has been followed by the primary team.    6.  Arterial hypertension.  Patient blood pressure has been labile.  Patient will be continued on the present dose of the carvedilol.    Thank you for the consultation.    The above plan of management were discussed with the patient      Time: Time spent in face-to-face evaluation of greater than 55 minutes interacting, formulating, examining and discussing the plan with the patient with 50% of greater time spent in face-to-face interaction.    Electronically signed by Vickey Mcclure MD, 01/13/23, 7:45 PM CST.    Dictated utilizing Dragon dictation.

## 2023-01-14 NOTE — PROGRESS NOTES
"Anticoagulation by Pharmacy - Warfarin    Dora Velazquez is a 77 y.o.female who has been consulted for warfarin for history of DVT/PE/atrial fibrillation.     Home regimen: Warfarin 4 mg PO nightly per med rec and patient/sitter on 1/13/2023   INR Goal: 2-3    Objective:  [Ht: 152.4 cm (60\"); Wt: 60.2 kg (132 lb 12.8 oz)]    Lab Results   Component Value Date    INR 2.30 (H) 01/14/2023    INR 2.19 (H) 01/13/2023    INR 2.26 (H) 01/12/2023    PROTIME 25.4 (H) 01/14/2023    PROTIME 24.4 (H) 01/13/2023    PROTIME 25.0 (H) 01/12/2023     Lab Results   Component Value Date    HGB 11.7 (L) 01/13/2023    HGB 12.1 01/12/2023    HGB 11.6 (L) 09/27/2022    HCT 36.0 01/13/2023    HCT 36.9 01/12/2023    HCT 34.6 09/27/2022     01/13/2023     01/12/2023     09/27/2022           Assessment  • H/H/plts WNL. No signs or symptoms of bleeding noted.   • Interacting medications: aspirin  • INR is therapeutic. Will continue home dose.     Plan:  1. Give warfarin 4 mg PO @ 1800 tonight.  2. PT/INR ordered daily.   3. Pharmacy will continue to follow    Thank you for this consult.     Piero Ferreira [facility]  01/14/23 10:46 CST     "

## 2023-01-14 NOTE — DISCHARGE SUMMARY
[facility] Medicine Services  DISCHARGE SUMMARY       Date of Admission: 1/12/2023  Date of Discharge:  1/14/2023  Primary Care Physician: Pineda Morris APRN    Presenting Problem/History of Present Illness:  Chest pain, unspecified type [R07.9]       Final Discharge Diagnoses:  Active Hospital Problems    Diagnosis    • **Chest pain, unspecified type    • CKD (chronic kidney disease) stage 3, GFR 30-59 ml/min ([facility])        Consults:   Consults     Date and Time Order Name Status Description    1/13/2023  3:57 AM Inpatient Cardiology Consult          Pertinent Test Results:   Lab Results (last 24 hours)     Procedure Component Value Units Date/Time    POC Glucose Once [[id number]]  (Normal) Collected: 01/14/23 1030    Specimen: Blood Updated: 01/14/23 1058     Glucose 130 mg/dL      Comment: RN NotifiedOperator: [id number] PEDRO TIFFANYMeter ID: [id number]       Extra Tubes [[id number]] Collected: 01/14/23 0550    Specimen: Blood, Venous Line Updated: 01/14/23 0700    Narrative:      The following orders were created for panel order Extra Tubes.  Procedure                               Abnormality         Status                     ---------                               -----------         ------                     Lavender Top[[id number]]                                     Final result               Green Top (Gel)[[id number]]                                  Final result                 Please view results for these tests on the individual orders.    Lavender Top [[id number]] Collected: 01/14/23 0550    Specimen: Blood Updated: 01/14/23 0700     Extra Tube hold for add-on     Comment: Auto resulted       Green Top (Gel) [[id number]] Collected: 01/14/23 0550    Specimen: Blood Updated: 01/14/23 0700     Extra Tube Hold for add-ons.     Comment: Auto resulted.       POC Glucose Once [[id number]]  (Normal) Collected: 01/14/23 0600    Specimen: Blood Updated: 01/14/23 0637     Glucose 103 mg/dL       Comment: RN NotifiedOperator: [id number] LIBBY CISNEROSNIFERMeter ID: [id number]       POC Glucose Once [[id number]]  (Abnormal) Collected: 01/13/23 1902    Specimen: Blood Updated: 01/14/23 0634     Glucose 183 mg/dL      Comment: RN NotifiedOperator: [id number] LIBBY JENNIFERMeter ID: [id number]       POC Glucose Once [[id number]]  (Normal) Collected: 01/13/23 1614    Specimen: Blood Updated: 01/14/23 0633     Glucose 129 mg/dL      Comment: RN NotifiedOperator: [id number] PEDRO TIFDEVINYMeter ID: [id number]       Protime-INR [[id number]]  (Abnormal) Collected: 01/14/23 0446    Specimen: Blood Updated: 01/14/23 0621     Protime 25.4 Seconds      INR 2.30    Narrative:      Therapeutic range for most indications is 2.0-3.0 INR,  or 2.5-3.5 for mechanical heart valves.        Imaging Results (Last 24 Hours)     Procedure Component Value Units Date/Time    CT Angiogram Coronary [[id number]] Collected: 01/14/23 0859     Updated: 01/14/23 1316    Narrative:      CT coronary arteriogram. Calcium score. Functional analysis.  CLINICAL HISTORY: 77-year-old female with chest pain    COMPARISON: Chest x-ray January 12, 2023.     Post processing was performed by the radiologist at the [facility]  workstation. Serial axial CT images were obtained through the  heart at 3 mm thickness without contrast for calcium scoring. 3D  images including vessel probing technique were also obtained.  Subsequently, following the intravenous administration of 85     ml of Isovue-370   , serial axial CT images were obtained through  the heart at 0.6 mm thickness utilizing retrospective gating.  Images were obtained without beta blocker premedication  premedication. Heart rate 65    . Full field of view  reconstructed images were used for evaluation of the extracardiac  tissues.    This exam was performed according to our departmental  dose-optimization program, which includes automated exposure  control, adjustment of the mA and/or kV according to  patient size  and/or use of iterative reconstruction technique.      CALCIUM PLAQUE BURDEN:    REGION                                         CALCIUM SCORE  ([place])  Left Main                                                      0       Right Coronary Artery                                 0      Left Anterior Descending                            0      Circumflex                                                    0       Posterior Descending Artery                       0             Your Calcium Score is 0         This places the patent in the low    risk for coronary artery  disease. (No identifiable plaque. Very low, generally less than  5% risk for coronary artery disease.)    CTA OF THE CORONARY ARTERIES: There is suboptimal visualization  of the left main, LAD, diagonals, circumflex, and RCA. (Patient  motion). There is a type A    LAD. The patient is right     dominant.    Left Main: No calcified or soft itssue density plaque and no  stenosis.  LAD: No calcified or soft tissue density plaque and no stenosis.  Circumflex: No calcified or soft tissue density plaque and no  stenosis.  RCA: No calcified or soft tisue density plaque and no stenosis.    The aortic valve is tricuspid. There is no myocardial bridging.  On short axis views, the myocardium is homogeneous in thickness.    EXTRACARDIAC SOFT TISSUES:  Mediastinum: On the imaging, the ascending and descending aorta  are normal in caliber. There is no mediastinal or hilar  lymphadenopathy. The pericardium is normal.  Lungs: No consolidation or focal nodules are present. There is no  pneumothorax or effusion. The pulmonary arteries are normal in  appearance.  Abdomen: No evidence of hiatal hernia. The imaged liver and  spleen are unremarkable.   Bones: There are no lytic or blastic lesions within the osseous  structures.    CT FUNCTIONAL ANALYSIS:  Ejection Fraction     38    %  Diastolic Volume     145    ml  Systolic Volume      90    ml  Stroke  "Volume        56 ml  Cardiac Output       4.3 L/minute      Impression:      Somewhat suboptimal, compromised exam due to patient  motion. No evidence of any plaque or narrowing in the coronary  arteries.        Calcium score 0, low risk for coronary disease.    Functional analysis demonstrates hypokinesis. Computer-assisted  calculation of left ventricular ejection fraction 38%.    Electronically signed by:  Arturo Reed MD  1/14/2023 1:14 PM CST  Workstation: [id number]        Chief Complaint on Day of Discharge: No complaints    Hospital Course:  77 year old female with past medical history of CHF, chronic anemia, arthritis, HLD, GERD, HTN, PAF, TIA, liver transplant who presented on 1/12/23 with complaints of chest pain.      Patient was evaluated by cardiology and underwent a CTA of the coronary arteries that showed a low risk study with no high risk stenosis.  Patient was recommended to discontinue Coreg and start metoprolol.  She is to continue her isosorbide, statin and Lasix.  Follow-up PCP in 1 week.  Follow with Dr. Mcclure in 2 to 3 weeks.    Condition on Discharge: Stable    Physical Exam on Discharge:  /60 (BP Location: Left arm, Patient Position: Lying)   Pulse 69   Temp 98.4 °F (36.9 °C) (Temporal)   Resp 18   Ht 152.4 cm (60\")   Wt 60.2 kg (132 lb 12.8 oz)   LMP 07/18/1971 (Within Months)   SpO2 98%   BMI 25.94 kg/m²   Physical Exam  Constitutional:       Appearance: She is well-developed.   HENT:      Head: Normocephalic and atraumatic.   Eyes:      Pupils: Pupils are equal, round, and reactive to light.   Cardiovascular:      Rate and Rhythm: Normal rate and regular rhythm.   Pulmonary:      Effort: Pulmonary effort is normal.      Breath sounds: Normal breath sounds.   Abdominal:      General: Bowel sounds are normal.      Palpations: Abdomen is soft.   Musculoskeletal:         General: Normal range of motion.      Cervical back: Normal range of motion and neck supple.   Skin:     " General: Skin is warm and dry.   Neurological:      Mental Status: She is alert and oriented to person, place, and time.   Psychiatric:         Behavior: Behavior normal.       Discharge Disposition:  Home or Self Care    Discharge Medications:     Discharge Medications      New Medications      Instructions Start Date   metoprolol tartrate 25 MG tablet  Commonly known as: LOPRESSOR   25 mg, Oral, Every 12 Hours Scheduled         Continue These Medications      Instructions Start Date   acetaminophen 325 MG tablet  Commonly known as: TYLENOL   650 mg, Oral, Every 6 Hours PRN      atorvastatin 40 MG tablet  Commonly known as: LIPITOR   20 mg, Oral, Nightly      carbidopa-levodopa  MG per tablet  Commonly known as: SINEMET   4 Times Daily      CLARITIN PO   10 mg, Oral, Daily      FeroSul 325 (65 FE) MG tablet  Generic drug: ferrous sulfate   325 mg, Oral, Daily With Breakfast      furosemide 40 MG tablet  Commonly known as: LASIX   40 mg, Oral, Daily      isosorbide mononitrate 120 MG 24 hr tablet  Commonly known as: IMDUR   120 mg, Oral, Daily      Januvia 25 MG tablet  Generic drug: SITagliptin   25 mg, Oral, 4 Times Weekly, Take Monday, Wednesday, Friday and saturday      magnesium gluconate 500 MG tablet  Commonly known as: MAGONATE   Oral      sirolimus 0.5 MG tablet tablet  Commonly known as: RAPAMUNE   0.5 mg, Oral, Daily      spironolactone 50 MG tablet  Commonly known as: ALDACTONE   25 mg, Oral, Every Morning      vitamin D3 125 MCG (5000 UT) capsule capsule   2,000 Units, Oral, Daily      warfarin 4 MG tablet  Commonly known as: COUMADIN   4 mg, Oral, Nightly         Stop These Medications    carvedilol 25 MG tablet  Commonly known as: COREG            Discharge Diet:   Diet Instructions     Diet: Cardiac      Discharge Diet: Cardiac          Activity at Discharge:   Activity Instructions     Activity as Tolerated            Discharge Care Plan/Instructions: As above    Follow-up Appointment:    Follow-up Information     Pineda Morris APRN .    Specialty: Nurse Practitioner  Contact information:  [facility] [place] [address] [phone]             Vickey Mcclure MD Follow up.    Specialty: Cardiology  Why: 2-3 weeks  Contact information:  [address] [place] [address] [address] [place] [address] [phone]                         Test Results Pending at Discharge:       Patient not prescribed/taking: ;ACE or ARB for medical/patient reason(s) including Cant take secondary to immunosuppressive medication. .       This document has been electronically signed by ROZ Hooks on January 14, 2023 14:16 CST        Time: Greater than 30 minutes.

## 2023-01-14 NOTE — PROGRESS NOTES
Discussed at length with the patient and the family her son Humberto Velazquez.  Patient has had some symptoms of chest discomfort since hospitalization.    After prolonged discussion due to the patient sudden onset of chest pain with underlying left bundle branch block which is an old finding with negative troponin the plan was to consider getting a CTA of the coronaries rather than coronary angiogram.  Patient would be started on isosorbide 30 mg.    If the patient CTA of the coronaries did not reveal of any evidence of any obstructive epicardial coronary artery disease patient would be treated medically and be able to be discharged over the weekend.

## 2023-01-15 LAB
QT INTERVAL: 426 MS
QT INTERVAL: 446 MS
QT INTERVAL: 472 MS
QTC INTERVAL: 497 MS
QTC INTERVAL: 504 MS
QTC INTERVAL: 527 MS

## 2023-01-15 NOTE — OUTREACH NOTE
Prep Survey    Flowsheet Row Responses   [facility] facility patient discharged from? [place]   Is LACE score < 7 ? No   Eligibility Readm Mgmt   Discharge diagnosis Chest pain   Does the patient have one of the following disease processes/diagnoses(primary or secondary)? Other   Does the patient have Home health ordered? No   Is there a DME ordered? No   Prep survey completed? Yes          ILANA SOSA - Registered Nurse

## 2023-01-18 ENCOUNTER — READMISSION MANAGEMENT (OUTPATIENT)
Dept: CALL CENTER | Facility: HOSPITAL | Age: 78
End: 2023-01-18
Payer: MEDICARE

## 2023-01-18 NOTE — OUTREACH NOTE
Medical Week 1 Survey    Flowsheet Row Responses   [facility] [place] patient discharged from? [place]   Does the patient have one of the following disease processes/diagnoses(primary or secondary)? Other   Week 1 attempt successful? Yes   Call start time 1051   Call end time 1053   Discharge diagnosis Chest pain   Meds reviewed with patient/caregiver? Yes   Is the patient having any side effects they believe may be caused by any medication additions or changes? No   Does the patient have all medications ordered at discharge? Yes   Is the patient taking all medications as directed (includes completed medication regime)? Yes   Does the patient have a primary care provider?  Yes   Does the patient have an appointment with their PCP within 7 days of discharge? Yes   Has the patient kept scheduled appointments due by today? Yes   Has home health visited the patient within 72 hours of discharge? N/A   Psychosocial issues? No   Did the patient receive a copy of their discharge instructions? Yes   Nursing interventions Reviewed instructions with patient   What is the patient's perception of their health status since discharge? Improving   Is the patient/caregiver able to teach back the hierarchy of who to call/visit for symptoms/problems? PCP, Specialist, Home health nurse, Urgent Care, ED, 911 Yes   If the patient is a current smoker, are they able to teach back resources for cessation? Not a smoker   Week 1 call completed? Yes          SALLIE LEIJA - Registered Nurse

## 2023-01-25 ENCOUNTER — READMISSION MANAGEMENT (OUTPATIENT)
Dept: CALL CENTER | Facility: HOSPITAL | Age: 78
End: 2023-01-25
Payer: MEDICARE

## 2023-01-25 NOTE — OUTREACH NOTE
Medical Week 2 Survey    Flowsheet Row Responses   [facility] patient discharged from? [place]   Does the patient have one of the following disease processes/diagnoses(primary or secondary)? Other   Week 2 attempt successful? Yes   Call start time 1346   Discharge diagnosis Chest pain   Call end time 1351   Is the patient taking all medications as directed (includes completed medication regime)? Yes   Does the patient have a primary care provider?  Yes   Has the patient kept scheduled appointments due by today? Yes   Psychosocial issues? No   What is the patient's perception of their health status since discharge? Improving   Is the patient/caregiver able to teach back the hierarchy of who to call/visit for symptoms/problems? PCP, Specialist, Home health nurse, Urgent Care, ED, 911 Yes   If the patient is a current smoker, are they able to teach back resources for cessation? Not a smoker   Additional teach back comments States she is doing well.  Denies any further issues with chest pain.   Week 2 Call Completed? Yes   Graduated/Revoked comments Denies questions or needs at this time.           AURA MCKNIGHT - Licensed Nurse

## 2023-02-21 ENCOUNTER — TELEPHONE (OUTPATIENT)
Dept: NEUROLOGY | Age: 78
End: 2023-02-21

## 2023-02-21 DIAGNOSIS — G20 PARKINSON DISEASE (HCC): ICD-10-CM

## 2023-02-21 NOTE — TELEPHONE ENCOUNTER
Pt's daughter called and states she recently picked up the pt's script for Sinemet and the directions state to take 2 tablets TID. She states the pt has been taking 2 tablets QID. I looked at Dr. Izabela Chowdhury visit note from 1/12/23 and it states she was asked to increase the Sinemet to 4 times a day. I called the pharmacy and spoke with Gui Patel. I gave a verbal and he changed the directions for the medication to say take 2 tablets QID. Pt's daughter is aware of this change.

## 2023-10-03 ENCOUNTER — PRE-ADMISSION TESTING (OUTPATIENT)
Dept: PREADMISSION TESTING | Facility: HOSPITAL | Age: 78
End: 2023-10-03
Payer: MEDICARE

## 2023-10-03 VITALS
WEIGHT: 120.15 LBS | DIASTOLIC BLOOD PRESSURE: 68 MMHG | SYSTOLIC BLOOD PRESSURE: 136 MMHG | OXYGEN SATURATION: 96 % | HEART RATE: 81 BPM | RESPIRATION RATE: 18 BRPM | HEIGHT: 62 IN | BODY MASS INDEX: 22.11 KG/M2

## 2023-10-03 LAB
ANION GAP SERPL CALCULATED.3IONS-SCNC: 11 MMOL/L (ref 5–15)
BUN SERPL-MCNC: 35 MG/DL (ref 8–23)
BUN/CREAT SERPL: 28.5 (ref 7–25)
CALCIUM SPEC-SCNC: 9.1 MG/DL (ref 8.6–10.5)
CHLORIDE SERPL-SCNC: 101 MMOL/L (ref 98–107)
CO2 SERPL-SCNC: 27 MMOL/L (ref 22–29)
CREAT SERPL-MCNC: 1.23 MG/DL (ref 0.57–1)
DEPRECATED RDW RBC AUTO: 43.8 FL (ref 37–54)
EGFRCR SERPLBLD CKD-EPI 2021: 44.8 ML/MIN/1.73
ERYTHROCYTE [DISTWIDTH] IN BLOOD BY AUTOMATED COUNT: 12.9 % (ref 12.3–15.4)
GLUCOSE SERPL-MCNC: 222 MG/DL (ref 65–99)
HCT VFR BLD AUTO: 37.8 % (ref 34–46.6)
HGB BLD-MCNC: 11.9 G/DL (ref 12–15.9)
MCH RBC QN AUTO: 29.5 PG (ref 26.6–33)
MCHC RBC AUTO-ENTMCNC: 31.5 G/DL (ref 31.5–35.7)
MCV RBC AUTO: 93.6 FL (ref 79–97)
PLATELET # BLD AUTO: 201 10*3/MM3 (ref 140–450)
PMV BLD AUTO: 11 FL (ref 6–12)
POTASSIUM SERPL-SCNC: 4 MMOL/L (ref 3.5–5.2)
RBC # BLD AUTO: 4.04 10*6/MM3 (ref 3.77–5.28)
SODIUM SERPL-SCNC: 139 MMOL/L (ref 136–145)
WBC NRBC COR # BLD: 6.62 10*3/MM3 (ref 3.4–10.8)

## 2023-10-03 PROCEDURE — 80048 BASIC METABOLIC PNL TOTAL CA: CPT

## 2023-10-03 PROCEDURE — 85027 COMPLETE CBC AUTOMATED: CPT

## 2023-10-03 PROCEDURE — 36415 COLL VENOUS BLD VENIPUNCTURE: CPT

## 2023-10-03 PROCEDURE — 93005 ELECTROCARDIOGRAM TRACING: CPT

## 2023-10-03 NOTE — DISCHARGE INSTRUCTIONS
Before you come to the hospital        Arrival time: AS DIRECTED BY OFFICE     YOU MAY TAKE THE FOLLOWING MEDICATION(S) THE MORNING OF SURGERY WITH A SIP OF WATER: Metoprolol tartrate (Lopressor)           ALL OTHER HOME MEDICATION CHECK WITH YOUR PHYSICIAN (especially if   you are taking diabetes medicines or blood thinners)      If you were given and instructed to use a germ- killing soap, use as directed the night before surgery and again the morning of surgery or as directed by your surgeon. (Use one-half of the bottle with each shower.)   See attached information for How to Use Chlorhexidine for Bathing if applicable.            Eating and drinking restrictions prior to scheduled arrival time    2 Hours before arrival time STOP   Drinking Clear liquids (water, black coffee-NO CREAM,  apple juice-no pulp)      8 Hours before arrival time STOP   All food, full liquids, and dairy products    (It is extremely important that you follow these guidelines to prevent delay or cancelation of your procedure)     Clear Liquids  Water and flavored water                                                                      Clear Fruit juices, such as cranberry juice and apple juice.  Black coffee (NO cream of any kind, including powdered).  Plain tea  Clear bouillon or broth.  Flavored gelatin.  Soda.  Gatorade or Powerade.  Full liquid examples  Juices that have pulp.  Frozen ice pops that contain fruit pieces.  Coffee with creamer  Milk.  Yogurt.                MANAGING PAIN AFTER SURGERY    We know you are probably wondering what your pain will be like after surgery.  Following surgery it is unrealistic to expect you will not have pain.   Pain is how our bodies let us know that something is wrong or cautions us to be careful.  That said, our goal is to make your pain tolerable.    Methods we may use to treat your pain include (oral or IV medications, PCAs, epidurals, nerve blocks, etc.)   While some procedures require IV  pain medications for a short time after surgery, transitioning to pain medications by mouth allows for better management of pain.   Your nurse will encourage you to take oral pain medications whenever possible.  IV medications work almost immediately, but only last a short while.  Taking medications by mouth allows for a more constant level of medication in your blood stream for a longer period of time.      Once your pain is out of control it is harder to get back under control.  It is important you are aware when your next dose of pain medication is due.  If you are admitted, your nurse may write the time of your next dose on the white board in your room to help you remember.      We are interested in your pain and encourage you to inform us about aggravating factors during your visit.   Many times a simple repositioning every few hours can make a big difference.    If your physician says it is okay, do not let your pain prevent you from getting out of bed. Be sure to call your nurse for assistance prior to getting up so you do not fall.      Before surgery, please decide your tolerable pain goal.  These faces help describe the pain ratings we use on a 0-10 scale.   Be prepared to tell us your goal and whether or not you take pain or anxiety medications at home.          Preparing for Surgery  Preparing for surgery is an important part of your care. It can make things go more smoothly and help you avoid complications. The steps leading up to surgery may vary among hospitals. Follow all instructions given to you by your health care providers. Ask questions if you do not understand something. Talk about any concerns that you have.  Here are some questions to consider asking before your surgery:  If my surgery is not an emergency (is elective), when would be the best time to have the surgery?  What arrangements do I need to make for work, home, or school?  What will my recovery be like? How long will it be before I  can return to normal activities?  Will I need to prepare my home? Will I need to arrange care for me or my children?  Should I expect to have pain after surgery? What are my pain management options? Are there nonmedical options that I can try for pain?  Tell a health care provider about:  Any allergies you have.  All medicines you are taking, including vitamins, herbs, eye drops, creams, and over-the-counter medicines.  Any problems you or family members have had with anesthetic medicines.  Any blood disorders you have.  Any surgeries you have had.  Any medical conditions you have.  Whether you are pregnant or may be pregnant.  What are the risks?  The risks and complications of surgery depend on the specific procedure that you have. Discuss all the risks with your health care providers before your surgery. Ask about common surgical complications, which may include:  Infection.  Bleeding or a need for blood replacement (transfusion).  Allergic reactions to medicines.  Damage to surrounding nerves, tissues, or structures.  A blood clot.  Scarring.  Failure of the surgery to correct the problem.  Follow these instructions before the procedure:  Several days or weeks before your procedure  You may have a physical exam by your primary health care provider to make sure it is safe for you to have surgery.  You may have testing. This may include a chest X-ray, blood and urine tests, electrocardiogram (ECG), or other testing.  Ask your health care provider about:  Changing or stopping your regular medicines. This is especially important if you are taking diabetes medicines or blood thinners.  Taking medicines such as aspirin and ibuprofen. These medicines can thin your blood. Do not take these medicines unless your health care provider tells you to take them.  Taking over-the-counter medicines, vitamins, herbs, and supplements.  Do not use any products that contain nicotine or tobacco, such as cigarettes and e-cigarettes.  If you need help quitting, ask your health care provider.  Avoid alcohol.  Ask your health care provider if there are exercises you can do to prepare for surgery.  Eat a healthy diet.   Plan to have someone 18 years of age or older to take you home from the hospital. We will need to verify your ride on the morning of surgery if you are being discharged home on the same day. Tell your ride to be expecting a call from the hospital prior to your procedure.   Plan to have a responsible adult care for you for at least 24 hours after you leave the hospital or clinic. This is important.  The day before your procedure  You may be given antibiotic medicine to take by mouth to help prevent infection. Take it as told by your health care provider.  You may be asked to shower with a germ-killing soap.  Follow instructions from your health care provider about eating and drinking restrictions. This includes gum, mints and hard candy.  Pack comfortable clothes according to your procedure.   The day of your procedure  You may need to take another shower with a germ-killing soap before you leave home in the morning.  With a small sip of water, take only the medicines that you are told to take.  Remove all jewelry including rings.   Leave anything you consider valuable at home except hearing aids if needed.  You do not need to bring your home medications into the hospital.   Do not wear any makeup, nail polish, powder, deodorant, lotion, hair accessories, or anything on your skin or body except your clothes.  If you will be staying in the hospital, bring a case to hold your glasses, contacts, or dentures. You may also want to bring your robe and non-skid footwear.       (Do not use denture adhesives since you will be asked to remove them during  surgery).   If you wear oxygen at home, bring it with you the day of surgery.  If instructed by your health care provider, bring your sleep apnea device with you on the day of your surgery (if  this applies to you).  You may want to leave your suitcase and sleep apnea device in the car until after surgery.   Arrive at the hospital as scheduled.  Bring a friend or family member with you who can help to answer questions and be present while you meet with your health care provider.  At the hospital  When you arrive at the hospital:  Go to registration located at the main entrance of the hospital. You will be registered and given a beeper and a sticker sheet. Take the stickers to the Outpatient nurses desk and place in the black tray. This is to notify staff that you have arrived. Then return to the lobby to wait.   When your beeper lights up and vibrates proceed through the double doors, under the stairs, and a member of the Outpatient Surgery staff will escort you to your preoperative room.  You may have to wear compression sleeves. These help to prevent blood clots and reduce swelling in your legs.  An IV may be inserted into one of your veins.              In the operating room, you may be given one or more of the following:        A medicine to help you relax (sedative).        A medicine to numb the area (local anesthetic).        A medicine to make you fall asleep (general anesthetic).        A medicine that is injected into an area of your body to numb everything below the                      injection site (regional anesthetic).  You may be given an antibiotic through your IV to help prevent infection.  Your surgical site will be marked or identified.    Contact a health care provider if you:  Develop a fever of more than 100.4°F (38°C) or other feelings of illness during the 48 hours before your surgery.  Have symptoms that get worse.  Have questions or concerns about your surgery.  Summary  Preparing for surgery can make the procedure go more smoothly and lower your risk of complications.  Before surgery, make a list of questions and concerns to discuss with your surgeon. Ask about the risks and  possible complications.  In the days or weeks before your surgery, follow all instructions from your health care provider. You may need to stop smoking, avoid alcohol, follow eating restrictions, and change or stop your regular medicines.  Contact your surgeon if you develop a fever or other signs of illness during the few days before your surgery.  This information is not intended to replace advice given to you by your health care provider. Make sure you discuss any questions you have with your health care provider.  Document Revised: 12/21/2018 Document Reviewed: 10/23/2018  [employer] Patient Education © 2021 [employer]

## 2023-10-04 LAB
QT INTERVAL: 470 MS
QTC INTERVAL: 499 MS

## 2023-10-05 NOTE — DISCHARGE INSTRUCTIONS
UPPER EXTREMITY POST-OP INSTRUCTIONS - DR. LAMAS    IMPORTANT PHONE NUMBERS:   For emergencies, please call [phone]   You may reach Dr. Lamas and clinical staff at [phone] M-F 8:00 am-5:00 pm   After 5pm or on the weekends, please call [phone]   Call immediately if you have any of the following symptoms:     Elevated temperature above 101.5 degrees for more than 48 hours after surgery     Persistent drainage from wound     Severe pain around surgical site    Sling use: The sling is provided for your comfort and to ensure proper healing of your repair following surgery. Please place the abduction pillow with the curved side against your side and the sling on the side of the pillow. Your surgery requires that you wear the sling if noted below.  ____ For comfort. Remove sling 24 hours and begin range of motion exercises  __x__ At all times except bathing, dressing, and therapy. Also wear the sling during sleep.  ____ No sling required    Bathing:  ___No bandages, no restrictions!!  _x__You may remove you dressing and shower on the 3rd day after surgery (Ex. Tu surgery, shower on Friday)  ** if you are told to it is ok to remove your dressing and shower, DO NOT SOAK your incisions in a tub.  ___Keep splint clean, dry, and intact. DO NOT place foreign objects into your splint.      Dressings: Keep dressing/splint intact unless instructed otherwise below. SOME DRAINAGE IS NORMAL!     DO NOT touch or apply ointment to the incision.     DO NOT remove the steri-strips over the incisions (if you have steri-strips). They will         generally fall off on their own or can be removed 1 weeksafter surgery.     If you have yellow gauze and it comes off, do not worry about it. Leave them off.    Signs of infection that warrant a phone call to our clinical line:     o Excessive drainage or redness     o Red streaking coming away from the incision  o Increased pain  o Increased temperature above 101  degrees      Physical Therapy:        *  Your physical therapy status will be discussed with you postoperatively and at your first post-op appointment. Some injuries will not require physical therapy.      *  If you have a shoulder manipulation, please schedule therapy for the next day      Medications: You will be discharged with the appropriate medications following your surgery. Fill these at the pharmacy and take them as directed on the label. Not all of the medications below may be prescribed. Occasionally, other medications may be prescribed with specific instructions.    Percocet/Lortab (oxycodone/hydrocodone with tylenol) - Pain Medication, will cause drowsiness, possibly itchiness (this is NOT an allergy - use benadryl or an over the counter allergy medication such as Claritin or Zyrtec)     o Take 1-2 tablets every 4-6 hours. DO NOT EXCEED 4,000mg of Tylenol in 24 hours.  **DO NOT MIX WITH ALCOHOL, DRIVE WHILE TAKING, OR TAKE with extra TYLENOL**    Colace (Docusate) - stool softener, used for constipation. Take this only if you feel constipated.      Zofran (Ondansetron) or Phenergan - Anti-nausea medication, will cause drowsiness      *Starting January 2021, all narcotic medication must be prescribed electronically to your pharmacy.  Be sure to notify nursing of your preferred pharmacy.  If you are running low on pain medications, please notify us if you need a refill 24-48 hours prior to when you run out, so we can make arrangements to refill the prescription for you if we determine is necessary

## 2023-10-07 PROBLEM — M12.811 RIGHT ROTATOR CUFF TEAR ARTHROPATHY: Status: ACTIVE | Noted: 2023-10-07

## 2023-10-07 PROBLEM — M75.101 RIGHT ROTATOR CUFF TEAR ARTHROPATHY: Status: ACTIVE | Noted: 2023-10-07

## 2023-10-07 NOTE — OP NOTE
Patient Name: Celena  MRN: [id number]  : 1945      DATE of SURGERY: 10/10/2023    SURGEON: Frantz Rosa MD    ASSISTANT: NONE    PREOPERATIVE DIAGNOSIS: Right Shoulder Rotator Cuff Tear Arthropathy     POSTOPERATIVE DIAGNOSIS:  Right Shoulder Rotator Cuff Tear Arthropathy     PROCEDURE PERFORMED:  Right Reverse Total Shoulder Arthroplasty    SURGICAL APPROACH: Deltopectoral    SURGICAL TECHNIQUE: Peel    IMPLANTS: [employer] Revers                Baseplate: small                Glenosphere: 36 + 4                Poly: + 6 metal spacer, + 3 constrained poly                Suture Cup: 36 neutral                             Stem: 10 mm [place] pressfit    ANESTHESIA USED: General endotracheal anesthesia, interscalene block    OPERATIVE INDICATIONS: 78 y.o. female with progressive loss of function and increasing pain of the upper extremity due to a massive irreparable tear of the rotator cuff.  She has had multiple cortisone injection of the shoulder over the years.  Due to loss of function and progressive pain, a reverse shoulder arthroplasty is planned to improve function and decrease pain.  She is unhealthy and has a history of a liver transplant, heart failure, and kidney disease.  She understood the significant risk of postop complications but wished to proceed with surgery.  An MRI showed atrophy of the rotator cuff musculature.  The patient had an intact axillary nerve and functioning deltoid. Surgical evaluation was discussed and the patient wished to proceed understanding risks, benefits, and alternatives. The surgical indications were to relieve pain, improve function, and prevent future disability in regards to the shoulder pathology dictated in the above diagnoses.  Risks included, but were not limited to, that of anesthesia, bleeding, infection, pain, damage to local structures, postoperative dislocation, need for further surgery, instability, stiffness, failure of implants, and loss of  "function.    The patient has failed a combination of the following to improve pain and function: physical therapy >12 weeks, corticosteroid injections, NSAID’s, activity modification.     ESTIMATED BLOOD LOSS: 150 mL    DRAINS: none     COMPLICATIONS: none    SPECIMENS: none    PROCEDURE IN DETAIL:  The patient was seen in the preoperative holding room, once again the informed consent was reviewed with the patient and signed.  The site of surgery was marked with the patient's agreement.  After being transported to the operating room, a timeout was performed identifying the correct patient as well as the operative site.  Dose appropriate IV antibiotics were given prior to incision.  The patient was positioned in the beach chair position, all bony prominences were protected and a sterile prep and drape was performed.  The surgical site was draped with loban dressing.    A deltopectoral approach to the shoulder joint was utilized as soft tissue was dissected down the level of the cephalic vein which was taken laterally along with the deltoid.  The biceps tendon was located, tenodesed to the superior border of the pectoralis major tendon insertion.  The rotator interval was opened.  A tagging stitch was placed in the subscapularis and with progressive external rotation of the shoulder, the tendon and underlying capsule were peeled from the lesser tuberosity.  The humeral head was dislocated from the glenoid and strategic retractors were placed to protect the surrounding soft tissue.  The axillary nerve was palpated and protected, verified with a \"tug test.\"    Beginning at the apex of the humeral head, a starting reamer was introduced into the shaft of the humerus, followed by reaming with a 5 and 6 mm reamer.  The proximal humeral osteotomy guide was inserted and an osteotomy was performed at 135 degrees and 30 degrees of retroversion.  A protective plate was placed on the osteotomy site and attention was turned to the " glenoid.      Again, strategic retractors were placed surrounding the glenoid, the axillary nerve was protected, and a complete capsulectomy was performed.  The labrum was excised.  A guidepin was placed in the inferior-central aspect of the glenoid, following by a reaming device, and drilling of the central peg hole.  A baseplate was impacted and superior, central, and inferior locking screws were inserted.  The glenosphere was then impacted without complication.    Attention was turned back to the humeral side where progressively sized broaches were inserted until a stable fit was achieved, followed by the metaphyseal reaming guide.  The metaphysis was reamed and a trial stem inserted.  Trial polyethylenes were placed in the suture cup until range of motion and stability were adequate.  The conjoined tendon showed increased tension. With traction of the shoulder, the entire scapula was translating without dissociation of the polyethylene.    Trial implants were removed, final implants impacted, and the shoulder was once again reduced showing excellent stability and range of motion.    The incision was thoroughly irrigated, followed by closure in layers.  The skin was closed with adhesive glue.  A sterile dressing and sling were placed.  Counts were correct.    The patient was awakened by anesthesia, transported to the recovery room in stable condition.    POSTOPERATIVE PLAN:  1) Admit for observation, pain control  2) Reverse total shoulder protocol    Electronically signed by Frantz Rosa MD on 10/10/2023 at 20:05 CDT

## 2023-10-10 ENCOUNTER — APPOINTMENT (OUTPATIENT)
Dept: GENERAL RADIOLOGY | Facility: HOSPITAL | Age: 78
End: 2023-10-10
Payer: MEDICARE

## 2023-10-10 ENCOUNTER — ANESTHESIA EVENT (OUTPATIENT)
Dept: PERIOP | Facility: HOSPITAL | Age: 78
End: 2023-10-10
Payer: MEDICARE

## 2023-10-10 ENCOUNTER — ANESTHESIA (OUTPATIENT)
Dept: PERIOP | Facility: HOSPITAL | Age: 78
End: 2023-10-10
Payer: MEDICARE

## 2023-10-10 ENCOUNTER — HOSPITAL ENCOUNTER (OUTPATIENT)
Facility: HOSPITAL | Age: 78
LOS: 1 days | Discharge: ANOTHER HOSPITAL | End: 2023-10-13
Attending: ORTHOPAEDIC SURGERY | Admitting: ORTHOPAEDIC SURGERY
Payer: MEDICARE

## 2023-10-10 DIAGNOSIS — Z78.9 DECREASED ACTIVITIES OF DAILY LIVING (ADL): ICD-10-CM

## 2023-10-10 DIAGNOSIS — Z74.09 IMPAIRED MOBILITY: Primary | ICD-10-CM

## 2023-10-10 LAB
ALBUMIN SERPL-MCNC: 3.8 G/DL (ref 3.5–5.2)
ALBUMIN/GLOB SERPL: 1.5 G/DL
ALP SERPL-CCNC: 143 U/L (ref 39–117)
ALT SERPL W P-5'-P-CCNC: 11 U/L (ref 1–33)
ANION GAP SERPL CALCULATED.3IONS-SCNC: 10 MMOL/L (ref 5–15)
AST SERPL-CCNC: 19 U/L (ref 1–32)
BILIRUB SERPL-MCNC: 0.9 MG/DL (ref 0–1.2)
BUN SERPL-MCNC: 18 MG/DL (ref 8–23)
BUN/CREAT SERPL: 19.6 (ref 7–25)
CALCIUM SPEC-SCNC: 9.5 MG/DL (ref 8.6–10.5)
CHLORIDE SERPL-SCNC: 100 MMOL/L (ref 98–107)
CO2 SERPL-SCNC: 28 MMOL/L (ref 22–29)
CREAT SERPL-MCNC: 0.92 MG/DL (ref 0.57–1)
EGFRCR SERPLBLD CKD-EPI 2021: 63.5 ML/MIN/1.73
GLOBULIN UR ELPH-MCNC: 2.6 GM/DL
GLUCOSE BLDC GLUCOMTR-MCNC: 134 MG/DL (ref 70–130)
GLUCOSE BLDC GLUCOMTR-MCNC: 142 MG/DL (ref 70–130)
GLUCOSE SERPL-MCNC: 144 MG/DL (ref 65–99)
POTASSIUM SERPL-SCNC: 4.1 MMOL/L (ref 3.5–5.2)
PROT SERPL-MCNC: 6.4 G/DL (ref 6–8.5)
SODIUM SERPL-SCNC: 138 MMOL/L (ref 136–145)

## 2023-10-10 PROCEDURE — 63710000001 GABAPENTIN 100 MG CAPSULE: Performed by: ORTHOPAEDIC SURGERY

## 2023-10-10 PROCEDURE — 25810000003 LACTATED RINGERS PER 1000 ML: Performed by: ORTHOPAEDIC SURGERY

## 2023-10-10 PROCEDURE — A9270 NON-COVERED ITEM OR SERVICE: HCPCS | Performed by: ORTHOPAEDIC SURGERY

## 2023-10-10 PROCEDURE — C1776 JOINT DEVICE (IMPLANTABLE): HCPCS | Performed by: ORTHOPAEDIC SURGERY

## 2023-10-10 PROCEDURE — 63710000001 ATORVASTATIN 10 MG TABLET: Performed by: ORTHOPAEDIC SURGERY

## 2023-10-10 PROCEDURE — 25010000002 BUPIVACAINE (PF) 0.5 % SOLUTION: Performed by: ANESTHESIOLOGY

## 2023-10-10 PROCEDURE — 25010000002 PROPOFOL 10 MG/ML EMULSION

## 2023-10-10 PROCEDURE — 63710000001 CARBIDOPA-LEVODOPA 25-100 MG TABLET: Performed by: ORTHOPAEDIC SURGERY

## 2023-10-10 PROCEDURE — 63710000001 RIVAROXABAN 10 MG TABLET: Performed by: ORTHOPAEDIC SURGERY

## 2023-10-10 PROCEDURE — 63710000001 METOPROLOL TARTRATE 25 MG TABLET: Performed by: ORTHOPAEDIC SURGERY

## 2023-10-10 PROCEDURE — 25010000002 CLINDAMYCIN 900 MG/6ML SOLUTION: Performed by: ORTHOPAEDIC SURGERY

## 2023-10-10 PROCEDURE — P9041 ALBUMIN (HUMAN),5%, 50ML: HCPCS

## 2023-10-10 PROCEDURE — 0 BUPIVACAINE LIPOSOME 1.3 % SUSPENSION: Performed by: ANESTHESIOLOGY

## 2023-10-10 PROCEDURE — 82948 REAGENT STRIP/BLOOD GLUCOSE: CPT

## 2023-10-10 PROCEDURE — 73030 X-RAY EXAM OF SHOULDER: CPT

## 2023-10-10 PROCEDURE — C9290 INJ, BUPIVACAINE LIPOSOME: HCPCS | Performed by: ANESTHESIOLOGY

## 2023-10-10 PROCEDURE — 25010000002 ALBUMIN HUMAN 5% PER 50 ML

## 2023-10-10 PROCEDURE — 25010000002 SUGAMMADEX 200 MG/2ML SOLUTION

## 2023-10-10 PROCEDURE — 80053 COMPREHEN METABOLIC PANEL: CPT | Performed by: ORTHOPAEDIC SURGERY

## 2023-10-10 DEVICE — IMPLANTABLE DEVICE: Type: IMPLANTABLE DEVICE | Site: SHOULDER | Status: FUNCTIONAL

## 2023-10-10 DEVICE — SCRW GLEN UNIVERS REVERS PERIPH 4.5X30MM: Type: IMPLANTABLE DEVICE | Site: SHOULDER | Status: FUNCTIONAL

## 2023-10-10 DEVICE — SCRW GLEN UNIVERS REVERS CENTRL 6.5X20MM: Type: IMPLANTABLE DEVICE | Site: SHOULDER | Status: FUNCTIONAL

## 2023-10-10 DEVICE — CUP SUT UNIVERS REVERS 33MM NTRL: Type: IMPLANTABLE DEVICE | Site: SHOULDER | Status: FUNCTIONAL

## 2023-10-10 DEVICE — STEM HUM/SHLDR UNIVERS REVERS APEX SZ10: Type: IMPLANTABLE DEVICE | Site: SHOULDER | Status: FUNCTIONAL

## 2023-10-10 DEVICE — SCRW GLEN UNIVERS REVERS PERIPH 4.5X36MM: Type: IMPLANTABLE DEVICE | Site: SHOULDER | Status: FUNCTIONAL

## 2023-10-10 DEVICE — GLENOSPHERE UNIVERS REVERS S/36 PLS4 LAT: Type: IMPLANTABLE DEVICE | Site: SHOULDER | Status: FUNCTIONAL

## 2023-10-10 RX ORDER — ONDANSETRON 4 MG/1
4 TABLET, FILM COATED ORAL EVERY 6 HOURS PRN
Status: DISCONTINUED | OUTPATIENT
Start: 2023-10-10 | End: 2023-10-13 | Stop reason: HOSPADM

## 2023-10-10 RX ORDER — FENTANYL CITRATE 50 UG/ML
25 INJECTION, SOLUTION INTRAMUSCULAR; INTRAVENOUS
Status: DISCONTINUED | OUTPATIENT
Start: 2023-10-10 | End: 2023-10-10 | Stop reason: HOSPADM

## 2023-10-10 RX ORDER — ACETAMINOPHEN 650 MG/1
650 SUPPOSITORY RECTAL EVERY 4 HOURS PRN
Status: DISCONTINUED | OUTPATIENT
Start: 2023-10-10 | End: 2023-10-13 | Stop reason: HOSPADM

## 2023-10-10 RX ORDER — DOCUSATE SODIUM 100 MG/1
100 CAPSULE, LIQUID FILLED ORAL 2 TIMES DAILY PRN
Status: DISCONTINUED | OUTPATIENT
Start: 2023-10-10 | End: 2023-10-13 | Stop reason: HOSPADM

## 2023-10-10 RX ORDER — SODIUM CHLORIDE 0.9 % (FLUSH) 0.9 %
3-10 SYRINGE (ML) INJECTION AS NEEDED
Status: DISCONTINUED | OUTPATIENT
Start: 2023-10-10 | End: 2023-10-10 | Stop reason: HOSPADM

## 2023-10-10 RX ORDER — CLINDAMYCIN PHOSPHATE 900 MG/50ML
900 INJECTION, SOLUTION INTRAVENOUS ONCE
Status: COMPLETED | OUTPATIENT
Start: 2023-10-10 | End: 2023-10-10

## 2023-10-10 RX ORDER — GABAPENTIN 100 MG/1
100 CAPSULE ORAL NIGHTLY
Status: DISCONTINUED | OUTPATIENT
Start: 2023-10-10 | End: 2023-10-13 | Stop reason: HOSPADM

## 2023-10-10 RX ORDER — SODIUM CHLORIDE 9 MG/ML
40 INJECTION, SOLUTION INTRAVENOUS AS NEEDED
Status: DISCONTINUED | OUTPATIENT
Start: 2023-10-10 | End: 2023-10-13 | Stop reason: HOSPADM

## 2023-10-10 RX ORDER — BUPIVACAINE HCL/0.9 % NACL/PF 0.125 %
PLASTIC BAG, INJECTION (ML) EPIDURAL AS NEEDED
Status: DISCONTINUED | OUTPATIENT
Start: 2023-10-10 | End: 2023-10-10 | Stop reason: SURG

## 2023-10-10 RX ORDER — GABAPENTIN 100 MG/1
100 CAPSULE ORAL NIGHTLY
COMMUNITY

## 2023-10-10 RX ORDER — SODIUM CHLORIDE, SODIUM LACTATE, POTASSIUM CHLORIDE, CALCIUM CHLORIDE 600; 310; 30; 20 MG/100ML; MG/100ML; MG/100ML; MG/100ML
100 INJECTION, SOLUTION INTRAVENOUS CONTINUOUS
Status: DISCONTINUED | OUTPATIENT
Start: 2023-10-10 | End: 2023-10-13 | Stop reason: HOSPADM

## 2023-10-10 RX ORDER — MELOXICAM 7.5 MG/1
15 TABLET ORAL DAILY
Status: DISCONTINUED | OUTPATIENT
Start: 2023-10-11 | End: 2023-10-11

## 2023-10-10 RX ORDER — ONDANSETRON 2 MG/ML
4 INJECTION INTRAMUSCULAR; INTRAVENOUS ONCE AS NEEDED
Status: DISCONTINUED | OUTPATIENT
Start: 2023-10-10 | End: 2023-10-10 | Stop reason: HOSPADM

## 2023-10-10 RX ORDER — ACETAMINOPHEN 325 MG/1
650 TABLET ORAL EVERY 4 HOURS PRN
Status: DISCONTINUED | OUTPATIENT
Start: 2023-10-10 | End: 2023-10-13 | Stop reason: HOSPADM

## 2023-10-10 RX ORDER — PROMETHAZINE HYDROCHLORIDE 25 MG/1
12.5 TABLET ORAL EVERY 6 HOURS PRN
Status: DISCONTINUED | OUTPATIENT
Start: 2023-10-10 | End: 2023-10-13 | Stop reason: HOSPADM

## 2023-10-10 RX ORDER — DIPHENHYDRAMINE HCL 25 MG
25 CAPSULE ORAL EVERY 6 HOURS PRN
Status: DISCONTINUED | OUTPATIENT
Start: 2023-10-10 | End: 2023-10-13 | Stop reason: HOSPADM

## 2023-10-10 RX ORDER — SPIRONOLACTONE 25 MG/1
25 TABLET ORAL DAILY
Status: DISCONTINUED | OUTPATIENT
Start: 2023-10-11 | End: 2023-10-13 | Stop reason: HOSPADM

## 2023-10-10 RX ORDER — LIDOCAINE HYDROCHLORIDE 10 MG/ML
0.5 INJECTION, SOLUTION EPIDURAL; INFILTRATION; INTRACAUDAL; PERINEURAL ONCE AS NEEDED
Status: DISCONTINUED | OUTPATIENT
Start: 2023-10-10 | End: 2023-10-10 | Stop reason: HOSPADM

## 2023-10-10 RX ORDER — PROMETHAZINE HYDROCHLORIDE 12.5 MG/1
12.5 SUPPOSITORY RECTAL EVERY 6 HOURS PRN
Status: DISCONTINUED | OUTPATIENT
Start: 2023-10-10 | End: 2023-10-13 | Stop reason: HOSPADM

## 2023-10-10 RX ORDER — SODIUM CHLORIDE 0.9 % (FLUSH) 0.9 %
1-10 SYRINGE (ML) INJECTION AS NEEDED
Status: DISCONTINUED | OUTPATIENT
Start: 2023-10-10 | End: 2023-10-13 | Stop reason: HOSPADM

## 2023-10-10 RX ORDER — OXYCODONE AND ACETAMINOPHEN 7.5; 325 MG/1; MG/1
1 TABLET ORAL EVERY 4 HOURS PRN
Status: DISCONTINUED | OUTPATIENT
Start: 2023-10-10 | End: 2023-10-11 | Stop reason: SDUPTHER

## 2023-10-10 RX ORDER — SIROLIMUS 0.5 MG/1
0.5 TABLET, FILM COATED ORAL DAILY
Status: DISCONTINUED | OUTPATIENT
Start: 2023-10-11 | End: 2023-10-13 | Stop reason: HOSPADM

## 2023-10-10 RX ORDER — NALOXONE HCL 0.4 MG/ML
0.4 VIAL (ML) INJECTION
Status: DISCONTINUED | OUTPATIENT
Start: 2023-10-10 | End: 2023-10-13 | Stop reason: HOSPADM

## 2023-10-10 RX ORDER — FAMOTIDINE 20 MG/1
40 TABLET, FILM COATED ORAL DAILY
Status: DISCONTINUED | OUTPATIENT
Start: 2023-10-11 | End: 2023-10-12

## 2023-10-10 RX ORDER — MAGNESIUM HYDROXIDE 1200 MG/15ML
LIQUID ORAL AS NEEDED
Status: DISCONTINUED | OUTPATIENT
Start: 2023-10-10 | End: 2023-10-10 | Stop reason: HOSPADM

## 2023-10-10 RX ORDER — FENTANYL CITRATE 50 UG/ML
25 INJECTION, SOLUTION INTRAMUSCULAR; INTRAVENOUS ONCE
Status: DISCONTINUED | OUTPATIENT
Start: 2023-10-10 | End: 2023-10-10 | Stop reason: HOSPADM

## 2023-10-10 RX ORDER — NALOXONE HCL 0.4 MG/ML
0.4 VIAL (ML) INJECTION AS NEEDED
Status: DISCONTINUED | OUTPATIENT
Start: 2023-10-10 | End: 2023-10-10 | Stop reason: HOSPADM

## 2023-10-10 RX ORDER — ISOSORBIDE MONONITRATE 60 MG/1
120 TABLET, EXTENDED RELEASE ORAL DAILY
Status: DISCONTINUED | OUTPATIENT
Start: 2023-10-11 | End: 2023-10-13 | Stop reason: HOSPADM

## 2023-10-10 RX ORDER — SODIUM CHLORIDE 0.9 % (FLUSH) 0.9 %
3 SYRINGE (ML) INJECTION AS NEEDED
Status: DISCONTINUED | OUTPATIENT
Start: 2023-10-10 | End: 2023-10-10 | Stop reason: HOSPADM

## 2023-10-10 RX ORDER — FLUMAZENIL 0.1 MG/ML
0.2 INJECTION INTRAVENOUS AS NEEDED
Status: DISCONTINUED | OUTPATIENT
Start: 2023-10-10 | End: 2023-10-10 | Stop reason: HOSPADM

## 2023-10-10 RX ORDER — SODIUM CHLORIDE, SODIUM LACTATE, POTASSIUM CHLORIDE, CALCIUM CHLORIDE 600; 310; 30; 20 MG/100ML; MG/100ML; MG/100ML; MG/100ML
1000 INJECTION, SOLUTION INTRAVENOUS CONTINUOUS
Status: DISPENSED | OUTPATIENT
Start: 2023-10-10 | End: 2023-10-12

## 2023-10-10 RX ORDER — OXYCODONE AND ACETAMINOPHEN 7.5; 325 MG/1; MG/1
2 TABLET ORAL EVERY 4 HOURS PRN
Status: DISCONTINUED | OUTPATIENT
Start: 2023-10-10 | End: 2023-10-13 | Stop reason: HOSPADM

## 2023-10-10 RX ORDER — KETAMINE HCL IN NACL, ISO-OSM 100MG/10ML
SYRINGE (ML) INJECTION AS NEEDED
Status: DISCONTINUED | OUTPATIENT
Start: 2023-10-10 | End: 2023-10-10 | Stop reason: SURG

## 2023-10-10 RX ORDER — ROCURONIUM BROMIDE 10 MG/ML
INJECTION, SOLUTION INTRAVENOUS AS NEEDED
Status: DISCONTINUED | OUTPATIENT
Start: 2023-10-10 | End: 2023-10-10 | Stop reason: SURG

## 2023-10-10 RX ORDER — ALBUMIN, HUMAN INJ 5% 5 %
SOLUTION INTRAVENOUS CONTINUOUS PRN
Status: DISCONTINUED | OUTPATIENT
Start: 2023-10-10 | End: 2023-10-10 | Stop reason: SURG

## 2023-10-10 RX ORDER — SODIUM CHLORIDE 9 MG/ML
40 INJECTION, SOLUTION INTRAVENOUS AS NEEDED
Status: DISCONTINUED | OUTPATIENT
Start: 2023-10-10 | End: 2023-10-10 | Stop reason: HOSPADM

## 2023-10-10 RX ORDER — LABETALOL HYDROCHLORIDE 5 MG/ML
5 INJECTION, SOLUTION INTRAVENOUS
Status: DISCONTINUED | OUTPATIENT
Start: 2023-10-10 | End: 2023-10-10 | Stop reason: HOSPADM

## 2023-10-10 RX ORDER — ONDANSETRON 2 MG/ML
4 INJECTION INTRAMUSCULAR; INTRAVENOUS EVERY 6 HOURS PRN
Status: DISCONTINUED | OUTPATIENT
Start: 2023-10-10 | End: 2023-10-13 | Stop reason: HOSPADM

## 2023-10-10 RX ORDER — DIPHENHYDRAMINE HYDROCHLORIDE 50 MG/ML
25 INJECTION INTRAMUSCULAR; INTRAVENOUS EVERY 6 HOURS PRN
Status: DISCONTINUED | OUTPATIENT
Start: 2023-10-10 | End: 2023-10-13 | Stop reason: HOSPADM

## 2023-10-10 RX ORDER — TRAMADOL HYDROCHLORIDE 50 MG/1
50 TABLET ORAL EVERY 6 HOURS PRN
Status: ON HOLD | COMMUNITY
End: 2023-10-11

## 2023-10-10 RX ORDER — SODIUM CHLORIDE 0.9 % (FLUSH) 0.9 %
10 SYRINGE (ML) INJECTION EVERY 12 HOURS SCHEDULED
Status: DISCONTINUED | OUTPATIENT
Start: 2023-10-10 | End: 2023-10-13 | Stop reason: HOSPADM

## 2023-10-10 RX ORDER — DROPERIDOL 2.5 MG/ML
0.62 INJECTION, SOLUTION INTRAMUSCULAR; INTRAVENOUS ONCE AS NEEDED
Status: DISCONTINUED | OUTPATIENT
Start: 2023-10-10 | End: 2023-10-10 | Stop reason: HOSPADM

## 2023-10-10 RX ORDER — FUROSEMIDE 40 MG/1
40 TABLET ORAL DAILY
Status: DISCONTINUED | OUTPATIENT
Start: 2023-10-11 | End: 2023-10-13 | Stop reason: HOSPADM

## 2023-10-10 RX ORDER — BUPIVACAINE HYDROCHLORIDE 5 MG/ML
INJECTION, SOLUTION EPIDURAL; INTRACAUDAL
Status: COMPLETED | OUTPATIENT
Start: 2023-10-10 | End: 2023-10-10

## 2023-10-10 RX ORDER — LIDOCAINE HYDROCHLORIDE 20 MG/ML
INJECTION, SOLUTION EPIDURAL; INFILTRATION; INTRACAUDAL; PERINEURAL AS NEEDED
Status: DISCONTINUED | OUTPATIENT
Start: 2023-10-10 | End: 2023-10-10 | Stop reason: SURG

## 2023-10-10 RX ORDER — SODIUM CHLORIDE 0.9 % (FLUSH) 0.9 %
3 SYRINGE (ML) INJECTION EVERY 12 HOURS SCHEDULED
Status: DISCONTINUED | OUTPATIENT
Start: 2023-10-10 | End: 2023-10-10 | Stop reason: HOSPADM

## 2023-10-10 RX ORDER — PROPOFOL 10 MG/ML
VIAL (ML) INTRAVENOUS AS NEEDED
Status: DISCONTINUED | OUTPATIENT
Start: 2023-10-10 | End: 2023-10-10 | Stop reason: SURG

## 2023-10-10 RX ORDER — ATORVASTATIN CALCIUM 10 MG/1
20 TABLET, FILM COATED ORAL NIGHTLY
Status: DISCONTINUED | OUTPATIENT
Start: 2023-10-10 | End: 2023-10-13 | Stop reason: HOSPADM

## 2023-10-10 RX ORDER — CEFAZOLIN SODIUM 1 G/50ML
1000 INJECTION, SOLUTION INTRAVENOUS EVERY 8 HOURS
Status: COMPLETED | OUTPATIENT
Start: 2023-10-11 | End: 2023-10-11

## 2023-10-10 RX ADMIN — BUPIVACAINE HYDROCHLORIDE 10 ML: 5 INJECTION, SOLUTION EPIDURAL; INTRACAUDAL; PERINEURAL at 16:23

## 2023-10-10 RX ADMIN — ROCURONIUM BROMIDE 50 MG: 10 SOLUTION INTRAVENOUS at 18:48

## 2023-10-10 RX ADMIN — SODIUM CHLORIDE, POTASSIUM CHLORIDE, SODIUM LACTATE AND CALCIUM CHLORIDE 1000 ML: 600; 310; 30; 20 INJECTION, SOLUTION INTRAVENOUS at 14:03

## 2023-10-10 RX ADMIN — Medication 25 MG: at 18:55

## 2023-10-10 RX ADMIN — ALBUMIN (HUMAN): 12.5 INJECTION, SOLUTION INTRAVENOUS at 19:12

## 2023-10-10 RX ADMIN — PROPOFOL 50 MG: 10 INJECTION, EMULSION INTRAVENOUS at 18:48

## 2023-10-10 RX ADMIN — SUGAMMADEX 200 MG: 100 INJECTION, SOLUTION INTRAVENOUS at 19:48

## 2023-10-10 RX ADMIN — Medication 25 MG: at 18:48

## 2023-10-10 RX ADMIN — BUPIVACAINE 10 ML: 13.3 INJECTION, SUSPENSION, LIPOSOMAL INFILTRATION at 16:23

## 2023-10-10 RX ADMIN — ATORVASTATIN CALCIUM 20 MG: 10 TABLET, FILM COATED ORAL at 23:23

## 2023-10-10 RX ADMIN — RIVAROXABAN 10 MG: 10 TABLET, FILM COATED ORAL at 23:23

## 2023-10-10 RX ADMIN — SODIUM CHLORIDE, POTASSIUM CHLORIDE, SODIUM LACTATE AND CALCIUM CHLORIDE 100 ML/HR: 600; 310; 30; 20 INJECTION, SOLUTION INTRAVENOUS at 23:23

## 2023-10-10 RX ADMIN — CARBIDOPA AND LEVODOPA 2 TABLET: 25; 100 TABLET ORAL at 23:23

## 2023-10-10 RX ADMIN — CLINDAMYCIN PHOSPHATE 900 MG: 900 INJECTION, SOLUTION INTRAVENOUS at 18:53

## 2023-10-10 RX ADMIN — Medication 100 MCG: at 19:27

## 2023-10-10 RX ADMIN — LIDOCAINE HYDROCHLORIDE 100 MG: 20 INJECTION, SOLUTION EPIDURAL; INFILTRATION; INTRACAUDAL at 18:48

## 2023-10-10 RX ADMIN — Medication 200 MCG: at 19:06

## 2023-10-10 RX ADMIN — GABAPENTIN 100 MG: 100 CAPSULE ORAL at 23:23

## 2023-10-10 RX ADMIN — METOPROLOL TARTRATE 25 MG: 25 TABLET, FILM COATED ORAL at 23:23

## 2023-10-10 NOTE — H&P
Pt Name: Dora Velazquez  MRN: [id number]  YOB: 1945  Date of evaluation: 10/10/2023    H&P including current review of systems was updated in the paper chart and/or the document previously scanned into the record.  There have been no significant changes or new problems since the original evaluation.  The patient's problems continue and indications for contemplated procedure have not changed.    Electronically signed by Frantz Rosa MD on 10/10/2023 at 13:42 CDT

## 2023-10-10 NOTE — ANESTHESIA PROCEDURE NOTES
Airway  Urgency: elective    Date/Time: 10/10/2023 6:49 PM  Airway not difficult    General Information and Staff    Patient location during procedure: OR  CRNA/CAA: Bhupinder Devine CRNA    Indications and Patient Condition    Preoxygenated: yes  Mask difficulty assessment: 1 - vent by mask    Final Airway Details  Final airway type: endotracheal airway      Successful airway: ETT  Cuffed: yes   Successful intubation technique: direct laryngoscopy  Endotracheal tube insertion site: oral  Blade: Felix  Blade size: 3  ETT size (mm): 7.0  Cormack-Lehane Classification: grade I - full view of glottis  Placement verified by: chest auscultation   Cuff volume (mL): 7  Measured from: lips  ETT/EBT  to lips (cm): 21  Number of attempts at approach: 1  Assessment: lips, teeth, and gum same as pre-op and atraumatic intubation

## 2023-10-10 NOTE — ANESTHESIA PREPROCEDURE EVALUATION
Anesthesia Evaluation     Patient summary reviewed   NPO Solid Status: > 8 hours             Airway   Dental      Pulmonary    Cardiovascular     (+) hypertension, dysrhythmias Atrial Fib, CHF Systolic <55%, DVT, hyperlipidemia    ROS comment: Echo:  ú Calculated left ventricular 3D EF = 46% Estimated left ventricular EF = 45% Left ventricular ejection fraction appears to be 41 - 45%. Left ventricular systolic function is low normal.  ú The left ventricular cavity is borderline dilated.  ú Left ventricular diastolic dysfunction is noted.  ú Left atrial volume is severely increased.  ú Estimated right ventricular systolic pressure from tricuspid regurgitation is normal (<35 mmHg).      Neuro/Psych  GI/Hepatic/Renal/Endo    (+) GERD, hepatitis, liver disease (s/p liver transplant), renal disease CRI, diabetes mellitus    Musculoskeletal     Abdominal    Substance History      OB/GYN          Other                    Anesthesia Plan    ASA 3     general with block     intravenous induction     Anesthetic plan, risks, benefits, and alternatives have been provided, discussed and informed consent has been obtained with: patient.    CODE STATUS:

## 2023-10-11 LAB
ANION GAP SERPL CALCULATED.3IONS-SCNC: 10 MMOL/L (ref 5–15)
BUN SERPL-MCNC: 16 MG/DL (ref 8–23)
BUN/CREAT SERPL: 19.8 (ref 7–25)
CALCIUM SPEC-SCNC: 8.4 MG/DL (ref 8.6–10.5)
CHLORIDE SERPL-SCNC: 105 MMOL/L (ref 98–107)
CO2 SERPL-SCNC: 25 MMOL/L (ref 22–29)
CREAT SERPL-MCNC: 0.81 MG/DL (ref 0.57–1)
EGFRCR SERPLBLD CKD-EPI 2021: 73.9 ML/MIN/1.73
GLUCOSE SERPL-MCNC: 135 MG/DL (ref 65–99)
HBA1C MFR BLD: 6.3 % (ref 4.8–5.6)
HCT VFR BLD AUTO: 33.5 % (ref 34–46.6)
HGB BLD-MCNC: 10 G/DL (ref 12–15.9)
POTASSIUM SERPL-SCNC: 4.2 MMOL/L (ref 3.5–5.2)
SODIUM SERPL-SCNC: 140 MMOL/L (ref 136–145)

## 2023-10-11 PROCEDURE — 63710000001 RIVAROXABAN 15 MG TABLET: Performed by: PHYSICIAN ASSISTANT

## 2023-10-11 PROCEDURE — 63710000001 FAMOTIDINE 20 MG TABLET: Performed by: ORTHOPAEDIC SURGERY

## 2023-10-11 PROCEDURE — A9270 NON-COVERED ITEM OR SERVICE: HCPCS | Performed by: ORTHOPAEDIC SURGERY

## 2023-10-11 PROCEDURE — 85014 HEMATOCRIT: CPT | Performed by: ORTHOPAEDIC SURGERY

## 2023-10-11 PROCEDURE — 63710000001 CARBIDOPA-LEVODOPA 25-100 MG TABLET: Performed by: ORTHOPAEDIC SURGERY

## 2023-10-11 PROCEDURE — 25810000003 LACTATED RINGERS PER 1000 ML: Performed by: ORTHOPAEDIC SURGERY

## 2023-10-11 PROCEDURE — 63710000001 METOPROLOL SUCCINATE XL 25 MG TABLET SUSTAINED-RELEASE 24 HOUR: Performed by: INTERNAL MEDICINE

## 2023-10-11 PROCEDURE — 63710000001 SIROLIMUS 0.5 MG TABLET: Performed by: ORTHOPAEDIC SURGERY

## 2023-10-11 PROCEDURE — A9270 NON-COVERED ITEM OR SERVICE: HCPCS | Performed by: PHYSICIAN ASSISTANT

## 2023-10-11 PROCEDURE — 63710000001 ISOSORBIDE MONONITRATE 60 MG TABLET SUSTAINED-RELEASE 24 HOUR: Performed by: ORTHOPAEDIC SURGERY

## 2023-10-11 PROCEDURE — 63710000001 ATORVASTATIN 10 MG TABLET: Performed by: ORTHOPAEDIC SURGERY

## 2023-10-11 PROCEDURE — 80048 BASIC METABOLIC PNL TOTAL CA: CPT | Performed by: ORTHOPAEDIC SURGERY

## 2023-10-11 PROCEDURE — A9270 NON-COVERED ITEM OR SERVICE: HCPCS | Performed by: INTERNAL MEDICINE

## 2023-10-11 PROCEDURE — 85018 HEMOGLOBIN: CPT | Performed by: ORTHOPAEDIC SURGERY

## 2023-10-11 PROCEDURE — 63710000001 SPIRONOLACTONE 25 MG TABLET: Performed by: ORTHOPAEDIC SURGERY

## 2023-10-11 PROCEDURE — 83036 HEMOGLOBIN GLYCOSYLATED A1C: CPT | Performed by: INTERNAL MEDICINE

## 2023-10-11 PROCEDURE — 97161 PT EVAL LOW COMPLEX 20 MIN: CPT | Performed by: PHYSICAL THERAPIST

## 2023-10-11 PROCEDURE — 25010000002 CEFAZOLIN 1-4 GM/50ML-% SOLUTION: Performed by: ORTHOPAEDIC SURGERY

## 2023-10-11 PROCEDURE — 97166 OT EVAL MOD COMPLEX 45 MIN: CPT

## 2023-10-11 PROCEDURE — 63710000001 OXYCODONE-ACETAMINOPHEN 7.5-325 MG TABLET: Performed by: ORTHOPAEDIC SURGERY

## 2023-10-11 PROCEDURE — 63710000001 LINAGLIPTIN 5 MG TABLET: Performed by: ORTHOPAEDIC SURGERY

## 2023-10-11 PROCEDURE — 63710000001 FUROSEMIDE 40 MG TABLET: Performed by: ORTHOPAEDIC SURGERY

## 2023-10-11 PROCEDURE — 63710000001 GABAPENTIN 100 MG CAPSULE: Performed by: ORTHOPAEDIC SURGERY

## 2023-10-11 RX ORDER — METOPROLOL SUCCINATE 25 MG/1
25 TABLET, EXTENDED RELEASE ORAL
Status: DISCONTINUED | OUTPATIENT
Start: 2023-10-11 | End: 2023-10-13 | Stop reason: HOSPADM

## 2023-10-11 RX ORDER — ATORVASTATIN CALCIUM 20 MG/1
20 TABLET, FILM COATED ORAL NIGHTLY
COMMUNITY

## 2023-10-11 RX ORDER — SPIRONOLACTONE 25 MG/1
25 TABLET ORAL EVERY MORNING
COMMUNITY

## 2023-10-11 RX ORDER — VITAMIN K2 90 MCG
2000 CAPSULE ORAL EVERY MORNING
COMMUNITY

## 2023-10-11 RX ORDER — TRAMADOL HYDROCHLORIDE 50 MG/1
50 TABLET ORAL EVERY 6 HOURS PRN
Status: DISCONTINUED | OUTPATIENT
Start: 2023-10-11 | End: 2023-10-13 | Stop reason: HOSPADM

## 2023-10-11 RX ADMIN — GABAPENTIN 100 MG: 100 CAPSULE ORAL at 21:36

## 2023-10-11 RX ADMIN — CEFAZOLIN SODIUM 1000 MG: 1 INJECTION, SOLUTION INTRAVENOUS at 16:50

## 2023-10-11 RX ADMIN — CARBIDOPA AND LEVODOPA 2 TABLET: 25; 100 TABLET ORAL at 21:36

## 2023-10-11 RX ADMIN — CARBIDOPA AND LEVODOPA 2 TABLET: 25; 100 TABLET ORAL at 08:35

## 2023-10-11 RX ADMIN — FAMOTIDINE 40 MG: 20 TABLET, FILM COATED ORAL at 08:35

## 2023-10-11 RX ADMIN — CEFAZOLIN SODIUM 1000 MG: 1 INJECTION, SOLUTION INTRAVENOUS at 07:34

## 2023-10-11 RX ADMIN — SODIUM CHLORIDE, POTASSIUM CHLORIDE, SODIUM LACTATE AND CALCIUM CHLORIDE 100 ML/HR: 600; 310; 30; 20 INJECTION, SOLUTION INTRAVENOUS at 16:53

## 2023-10-11 RX ADMIN — SIROLIMUS 0.5 MG: 0.5 TABLET, FILM COATED ORAL at 08:35

## 2023-10-11 RX ADMIN — SPIRONOLACTONE 25 MG: 25 TABLET ORAL at 08:35

## 2023-10-11 RX ADMIN — Medication 10 ML: at 08:35

## 2023-10-11 RX ADMIN — LINAGLIPTIN 5 MG: 5 TABLET, FILM COATED ORAL at 08:35

## 2023-10-11 RX ADMIN — ATORVASTATIN CALCIUM 20 MG: 10 TABLET, FILM COATED ORAL at 21:36

## 2023-10-11 RX ADMIN — METOPROLOL SUCCINATE 25 MG: 25 TABLET, EXTENDED RELEASE ORAL at 12:16

## 2023-10-11 RX ADMIN — ISOSORBIDE MONONITRATE 120 MG: 60 TABLET, EXTENDED RELEASE ORAL at 08:35

## 2023-10-11 RX ADMIN — RIVAROXABAN 15 MG: 15 TABLET, FILM COATED ORAL at 17:02

## 2023-10-11 RX ADMIN — FUROSEMIDE 40 MG: 40 TABLET ORAL at 08:35

## 2023-10-11 RX ADMIN — OXYCODONE HYDROCHLORIDE AND ACETAMINOPHEN 2 TABLET: 7.5; 325 TABLET ORAL at 11:14

## 2023-10-11 RX ADMIN — CARBIDOPA AND LEVODOPA 2 TABLET: 25; 100 TABLET ORAL at 16:50

## 2023-10-11 NOTE — CASE MANAGEMENT/SOCIAL WORK
Continued Stay Note   Alicia     Patient Name: Dora Velazquez  MRN: [id number]  Today's Date: 10/11/2023    Admit Date: 10/10/2023        Discharge Plan       Row Name 10/11/23 1707       Plan    Plan Comments PT gabriela faxed to [facility] swing bed admissions. Will follow up with admissions in AM                   Discharge Codes    No documentation.                       RONALD Mendez

## 2023-10-11 NOTE — PROGRESS NOTES
"  Orthopedic Surgery Progress Note    Dora Velazquez  10/11/2023      Subjective:     Systemic or Specific Complaints: resting In bed, pain controlled     Objective:     Patient Vitals for the past 24 hrs:   BP Temp Temp src Pulse Resp SpO2 Height Weight   10/11/23 0300 141/66 97.9 °F (36.6 °C) Oral 70 16 98 % -- --   10/11/23 0020 -- -- -- -- -- 97 % -- --   10/10/23 2305 131/77 97.6 °F (36.4 °C) Oral 78 15 99 % -- --   10/10/23 2230 -- -- -- -- -- 96 % -- --   10/10/23 2118 148/75 97.9 °F (36.6 °C) Oral 81 14 99 % -- --   10/10/23 2048 159/81 97.6 °F (36.4 °C) Axillary 81 15 96 % 157 cm (61.81\") 54.6 kg (120 lb 4.8 oz)   10/10/23 2030 156/84 -- -- 81 16 93 % -- --   10/10/23 2020 147/78 -- -- 89 19 96 % -- --   10/10/23 2015 145/72 -- -- 77 19 100 % -- --   10/10/23 2010 150/75 -- -- 75 15 100 % -- --   10/10/23 2005 160/75 -- -- 74 15 99 % -- --   10/10/23 2002 160/75 97.3 °F (36.3 °C) Temporal 78 21 100 % -- --   10/10/23 1755 -- -- -- 68 -- 97 % -- --   10/10/23 1750 -- -- -- 71 -- 97 % -- --   10/10/23 1745 -- -- -- 67 -- 96 % -- --   10/10/23 1740 -- -- -- 65 -- 97 % -- --   10/10/23 1735 -- -- -- 64 -- 98 % -- --   10/10/23 1730 -- -- -- 66 -- 96 % -- --   10/10/23 1725 -- -- -- 75 -- 97 % -- --   10/10/23 1720 -- -- -- 72 -- 97 % -- --   10/10/23 1715 -- -- -- 72 -- 96 % -- --   10/10/23 1710 133/68 -- -- 71 -- 96 % -- --   10/10/23 1705 -- -- -- 75 -- 98 % -- --   10/10/23 1700 -- -- -- 67 -- 97 % -- --   10/10/23 1655 127/64 -- -- 66 -- 98 % -- --   10/10/23 1650 -- -- -- 64 -- 97 % -- --   10/10/23 1645 -- -- -- 67 -- 97 % -- --   10/10/23 1640 136/70 -- -- 78 -- 97 % -- --   10/10/23 1635 -- -- -- 74 -- 97 % -- --   10/10/23 1630 -- -- -- 75 -- 98 % -- --   10/10/23 1628 -- -- -- 69 -- 98 % -- --   10/10/23 1627 -- -- -- 76 -- 99 % -- --   10/10/23 1626 159/81 -- -- 69 13 98 % -- --   10/10/23 1626 -- -- -- 68 -- 97 % -- --   10/10/23 1625 -- -- -- 79 -- 98 % -- --   10/10/23 1624 159/81 -- -- 88 -- " 97 % -- --   10/10/23 1620 -- -- -- 69 -- 98 % -- --   10/10/23 1615 -- -- -- 76 -- 99 % -- --   10/10/23 1610 -- -- -- 79 -- 98 % -- --   10/10/23 1338 (!) 146/102 -- -- -- -- -- -- --   10/10/23 1337 (!) 159/113 97.8 °F (36.6 °C) Temporal 76 14 96 % -- --       right upper  General: alert, appears stated age and cooperative   Wound: covered             Dressing: Clean, dry, intact   Extremity: Distal NVI           DVT Exam: No evidence of DVT                   Data Review:  Lab Results (last 24 hours)       Procedure Component Value Units Date/Time    Basic Metabolic Panel [[id number]]  (Abnormal) Collected: 10/11/23 0448    Specimen: Blood Updated: 10/11/23 0544     Glucose 135 mg/dL      BUN 16 mg/dL      Creatinine 0.81 mg/dL      Sodium 140 mmol/L      Potassium 4.2 mmol/L      Chloride 105 mmol/L      CO2 25.0 mmol/L      Calcium 8.4 mg/dL      BUN/Creatinine Ratio 19.8     Anion Gap 10.0 mmol/L      eGFR 73.9 mL/min/1.73     Narrative:      GFR Normal >60  Chronic Kidney Disease <60  Kidney Failure <15    The GFR formula is only valid for adults with stable renal function between ages 18 and 70.    Hemoglobin & Hematocrit, Blood [[id number]]  (Abnormal) Collected: 10/11/23 0448    Specimen: Blood Updated: 10/11/23 0527     Hemoglobin 10.0 g/dL      Hematocrit 33.5 %     POC Glucose Once [[id number]]  (Abnormal) Collected: 10/10/23 2007    Specimen: Blood Updated: 10/10/23 2019     Glucose 134 mg/dL      Comment: : [id number] Benny TinaMeter ID: [id number]       Comprehensive Metabolic Panel [[id number]]  (Abnormal) Collected: 10/10/23 1357    Specimen: Blood Updated: 10/10/23 1431     Glucose 144 mg/dL      BUN 18 mg/dL      Creatinine 0.92 mg/dL      Sodium 138 mmol/L      Potassium 4.1 mmol/L      Chloride 100 mmol/L      CO2 28.0 mmol/L      Calcium 9.5 mg/dL      Total Protein 6.4 g/dL      Albumin 3.8 g/dL      ALT (SGPT) 11 U/L      AST (SGOT) 19 U/L      Alkaline Phosphatase 143 U/L      Total  Bilirubin 0.9 mg/dL      Globulin 2.6 gm/dL      A/G Ratio 1.5 g/dL      BUN/Creatinine Ratio 19.6     Anion Gap 10.0 mmol/L      eGFR 63.5 mL/min/1.73     Narrative:      GFR Normal >60  Chronic Kidney Disease <60  Kidney Failure <15    The GFR formula is only valid for adults with stable renal function between ages 18 and 70.    POC Glucose Once [[id number]]  (Abnormal) Collected: 10/10/23 1354    Specimen: Blood Updated: 10/10/23 1405     Glucose 142 mg/dL      Comment: : [id number] Robert MenendezMeter ID: [id number]             Imaging Results (Last 24 Hours)       Procedure Component Value Units Date/Time    XR Shoulder 2+ View Right [[id number]] Collected: 10/10/23 2031     Updated: 10/10/23 2034    Narrative:      EXAMINATION: XR SHOULDER 2+ VW RIGHT-  10/10/2023 8:31 PM CDT     HISTORY: Postop     FINDINGS: 2 view exam of the right shoulder reveal the patient is  undergone reverse right total shoulder arthroplasty. There is good  anatomic alignment with no evidence of complication.     This report was signed and finalized on 10/10/2023 8:31 PM CDT by Dr. Panda Kim MD.               Assessment:   1 Day Post-Op  Right Reverse TSA     Plan:      1:  DVT prophylaxis, ICE, elevate  2:  Pain control  3:  Physical therapy/Occupational therapy  4:  Anticipate discharge in 1-3 days, may require placement, family requesting [facility] Bed.   5:  CAROLINA Hutchison PA-C

## 2023-10-11 NOTE — PLAN OF CARE
Goal Outcome Evaluation:  Plan of Care Reviewed With: (P) patient        Progress: (P) no change  Outcome Evaluation: (P) PT eval complete. Pt is A&Ox4 with no complaints of pain this visit. She is seated with sling on RUE on arrival. Pt educated on RTSA protocol and sling donning/doffing. and wear schedule. She presents with severe kyphosis in seated and requires min A for sit>stand and for standing balance at times. Patient has intermittent tremors of LUE this visit. She requires consistent cueing to correct kyphotic posture. She requires increased time for ambulation and verbal cueing for sequencing of movements with ambulation. She ambulated 3 feet with min A at times to keep trunk as extended as possible and to keep midline posture. Pt typically ambulates with rollator, and is transitioning to quad cane usage status post R RTSA. She has some tremor in LUE when using quad cane. Her anterior center of mass decreases her balance and makes her a fall risk, along with use of new assistive device that is less stable. PT services necessary for assistive device training and to decrease fall risk. Patient wishes to d/c to swing bed. Anticipate discharge to subacute rehab.      Anticipated Discharge Disposition (PT): (P) sub acute care setting

## 2023-10-11 NOTE — CASE MANAGEMENT/SOCIAL WORK
Discharge Planning Assessment  [facility]     Patient Name: Dora Velazquez  MRN: [id number]  Today's Date: 10/11/2023    Admit Date: 10/10/2023        Discharge Needs Assessment       Row Name 10/11/23 1426       Living Environment    People in Home alone    Current Living Arrangements home    Potentially Unsafe Housing Conditions none    In the past 12 months has the electric, gas, oil, or water company threatened to shut off services in your home? No    Primary Care Provided by self;other (see comments)    Provides Primary Care For no one    Family Caregiver if Needed child(patricia), adult    Quality of Family Relationships helpful;involved    Able to Return to Prior Arrangements other (see comments)    Living Arrangement Comments Pt/son requesting a referral to [facility] bed       Resource/Environmental Concerns    Resource/Environmental Concerns none    Transportation Concerns none       Food Insecurity    Within the past 12 months, you worried that your food would run out before you got the money to buy more. Never true    Within the past 12 months, the food you bought just didn't last and you didn't have money to get more. Never true       Transition Planning    Patient/Family Anticipates Transition to inpatient rehabilitation facility    Patient/Family Anticipated Services at Transition skilled nursing;rehabilitation services    Transportation Anticipated family or friend will provide       Discharge Needs Assessment    Readmission Within the Last 30 Days no previous admission in last 30 days    Equipment Currently Used at Home walker, standard    Concerns to be Addressed adjustment to diagnosis/illness    Anticipated Changes Related to Illness inability to care for self    Equipment Needed After Discharge other (see comments)    Outpatient/Agency/Support Group Needs skilled nursing facility    Discharge Facility/Level of Care Needs nursing facility, skilled    Provided Post Acute Provider List? Yes     Delivered To Support Person;Patient    Method of Delivery In person    Discharge Coordination/Progress SW spoke to pt and son in room regarding dc plans/needs. Pt lives alone but has someone that comes in and assists daily. Pt son resides in [place]. Both pt and son are requesting a referral be sent to [facility] bed. Referral has been sent. Will need to send PT eval once completed.                   Discharge Plan       Row Name 10/11/23 1407       Plan    Plan Comments Referral has been sent to [facility] bed per pt/son request. Will need to send PT eval once completed as pt insurance will require a precert.                  Continued Care and Services - Admitted Since 10/10/2023       Destination       Service Provider Request Status Selected Services Address Phone Fax Patient Preferred    [facility] SWING BED Pending - Request Sent N/A [address] [place] [address] [phone] --                     Demographic Summary    No documentation.                  Functional Status    No documentation.                  Psychosocial    No documentation.                  Abuse/Neglect    No documentation.                  Legal    No documentation.                  Substance Abuse    No documentation.                  Patient Forms    No documentation.                     RONALD Mendez

## 2023-10-11 NOTE — BRIEF OP NOTE
TOTAL SHOULDER REVERSE ARTHROPLASTY  Progress Note    Dora Venegas Caroline  10/10/2023    Pre-op Diagnosis:   M12.811       Post-Op Diagnosis Codes:     * Right rotator cuff tear arthropathy [M75.101, M12.811]    Procedure/CPT® Codes:  NV ARTHROPLASTY GLENOHUMERAL JOINT TOTAL SHOULDER [93892]      Procedure(s):  RIGHT REVERSE TOTAL SHOULDER ARTHROPLASTY        SURGICAL APPROACH: Deltopectoral    SURGICAL TECHNIQUE: Peel      Surgeon(s):  Frantz Rosa MD    Anesthesia: General with Block    Staff:   Circulator: Jose Monte RN  Scrub Person: Bhupinder Best; Ester Emery Selena         Estimated Blood Loss: <500ml    Urine Voided: * No values recorded between 10/10/2023  6:44 PM and 10/10/2023  7:50 PM *    Specimens:                None          Drains:   External Urinary Catheter (Active)       Findings: see op note         Complications: none          Frantz Rosa MD     Date: 10/10/2023  Time: 20:02 CDT

## 2023-10-11 NOTE — PLAN OF CARE
Goal Outcome Evaluation:  Plan of Care Reviewed With: patient        Progress: no change  Outcome Evaluation: OT eval completed. Pt in fowlers upon therapist arrival; A&Ox4; No pain reported, nerve block still intact. Pt reports requiring Mod A for bathing/dressing and being Mod I with toileting and fxl ambulation utilizing rwx or rollator at [facility]. Today, Pt performed supine>sit with HOB elevated requiring Mod A and verbal cues for sequencing. Pt performed sit>stand from bed requiring Min A and verbal cues for body mechanics and sequencing; initially demo'd forward flexed posture but able to correct with cueing. Once standing Pt performed pivot transfer to BS with HHA x1 and Min A. Pt was able to perform sit>stand pivot transfer from BSC>recliner with HHAx1 requiring CGA only. Pt performed anterior liliya hygiene while seated on BSC with SBA. Pt currently requires Max A for UB and LB bathing/dressing. Pt is R hand dominant at baseline and does demonstrate some difficulty utilizing L hand for fxl tasks. Skilled OT indicated in order to address deficits in fxl mobility, fxl activity tolerance, balance, strength, and use of adaptive techniques/equipment during performance of BADLs. Recommend sub acute rehab at discharge.      Anticipated Discharge Disposition (OT): sub acute care setting

## 2023-10-11 NOTE — PLAN OF CARE
Goal Outcome Evaluation:  Plan of Care Reviewed With: patient        Progress: no change  Outcome Evaluation: Patient arrived on 3A post surgery. A/O x 4. IV fluids infusing. VSS. Immobilizer/sling in place. Planning for PT/OT. Safety maintained. Call light within reach.

## 2023-10-11 NOTE — DISCHARGE PLACEMENT REQUEST
"Leonie Velazquez (78 y.o. Female)       Date of Birth   1945    Social Security Number       Address   [address] [place] [address]    Home Phone   [phone]    MRN   [id number]       Mandaeism   Rastafari    Marital Status   Single                            Admission Date   10/10/23    Admission Type   Elective    Admitting Provider   Frantz Rosa MD    Attending Provider   Frantz Rosa MD    Department, Room/Bed   [facility] 3A, 356/1       Discharge Date       Discharge Disposition       Discharge Destination                                 Attending Provider: Frantz Rosa MD    Allergies: Lortab [Hydrocodone-acetaminophen], Penicillins, Adhesive Tape, Tape, Codeine    Isolation: None   Infection: None   Code Status: CPR    Ht: 157 cm (61.81\")   Wt: 54.6 kg (120 lb 4.8 oz)    Admission Cmt: None   Principal Problem: Right rotator cuff tear arthropathy [M75.101,M12.811]                   Active Insurance as of 10/10/2023       Primary Coverage       Payor Plan Insurance Group Employer/Plan Group    ANTHEM MEDICARE REPLACEMENT ANTHEM MEDICARE ADVANTAGE [id number]       Payor Plan Address Payor Plan Phone Number Payor Plan Fax Number Effective Dates    [address] [phone]  2/1/2021 - None Entered    [facility] [address]         Subscriber Name Subscriber Birth Date Member ID       LEONIE VELAZQUEZ 1945 [id number]               Secondary Coverage       Payor Plan Insurance Group Employer/Plan Group    [place]        Payor Plan Address Payor Plan Phone Number Payor Plan Fax Number Effective Dates    [address] [phone]  8/1/2022 - None Entered    [facility] [id number]         Subscriber Name Subscriber Birth Date Member ID       LEONIE VELAZQUEZ 1945 [id number]                     Emergency Contacts        (Rel.) Home Phone Work Phone Mobile Phone    Cele Velazquez (Son) [phone] -- " [phone]    Rica Ni (Care Giver) -- -- [phone]                 Physical Therapy Notes (last 24 hours)        Saurav Moise, PT Student at 10/11/23 1554  Version 1 of       Attestation signed by Meredith Nance PT, DPT, NCS at 10/11/23 1555    I reviewed the documentation and agree.                   Goal Outcome Evaluation:  Plan of Care Reviewed With: (P) patient        Progress: (P) no change  Outcome Evaluation: (P) PT eval complete. Pt is A&Ox4 with no complaints of pain this visit. She is seated with sling on RUE on arrival. Pt educated on RTSA protocol and sling donning/doffing. and wear schedule. She presents with severe kyphosis in seated and requires min A for sit>stand and for standing balance at times. Patient has intermittent tremors of LUE this visit. She requires consistent cueing to correct kyphotic posture. She requires increased time for ambulation and verbal cueing for sequencing of movements with ambulation. She ambulated 3 feet with min A at times to keep trunk as extended as possible and to keep midline posture. Pt typically ambulates with rollator, and is transitioning to quad cane usage status post R RTSA. She has some tremor in LUE when using quad cane. Her anterior center of mass decreases her balance and makes her a fall risk, along with use of new assistive device that is less stable. PT services necessary for assistive device training and to decrease fall risk. Patient wishes to d/c to swing bed. Anticipate discharge to subacute rehab.      Anticipated Discharge Disposition (PT): (P) sub acute care setting    Electronically signed by Meredith Nance, PT, DPT, NCS at 10/11/23 1555       Saurav Moise, PT Student at 10/11/23 1555  Version 1 of 1      Attestation signed by Meredith Nance PT, DPT, NCS at 10/11/23 1555    I reviewed the documentation and agree.                   Patient Name: Dora Velazquez  : 1945    MRN: [id number]                               Today's Date: 10/11/2023       Admit Date: 10/10/2023    Visit Dx:     ICD-10-CM ICD-9-CM   1. Impaired mobility [Z74.09]  Z74.09 799.89     Patient Active Problem List   Diagnosis    Paroxysmal atrial fibrillation    (HFpEF) heart failure with preserved ejection fraction    Essential hypertension    Transient cerebral ischemia    Left bundle branch block    Hyperlipidemia    Encounter for colonoscopy due to history of colonic polyp    Recurrent deep vein thrombosis (DVT)    Anemia    Gastrointestinal hemorrhage    Chest pain, unspecified type    CKD (chronic kidney disease) stage 3, GFR 30-59 ml/min    Right rotator cuff tear arthropathy     Past Medical History:   Diagnosis Date    Arthritis     Backache     Bilateral pleural effusion     Blood in feces     Blood in feces symptom    Capsulitis     Cardiomyopathy      HFpEF, improved       Chest pain     CHF (congestive heart failure)     Chronic anemia     Chronic hepatitis C     Degenerative joint disease involving multiple joints     Diabetes mellitus     Dilated cardiomyopathy     Dyslipidemia     Dyspnea     Edema of leg     Epistaxis     Essential hypertension     Fibromyalgia, primary     GERD (gastroesophageal reflux disease)     H/O endoscopy 06/30/2014    Colon endoscopy 58567    Headache     Hemorrhoids     internal & external    History of liver recipient     S/P done at [facility] 2003       History of transfusion     Hyperlipemia     Hyperlipidemia     Hypertension     Hypokalemia     Left bundle branch block     Metatarsalgia     Metatarsalgia - with fat pad atrophie       Pap smear for cervical cancer screening 02/09/1996    Parkinson's disease     Paroxysmal atrial fibrillation     Polyp of sigmoid colon     Salmonella arthritis     Transient cerebral ischemia     Unspecified     Past Surgical History:   Procedure Laterality Date    BACK SURGERY  1995    Back Surgery (2)       CARDIAC CATHETERIZATION  11/03/2005    Orders Not Yet Performed: 0         CARPAL TUNNEL RELEASE  02/12/2007    Carpal tunnel surgery (1)      CAUTERIZATION NASAL BLEEDERS  03/09/2000    Control nose/throat bleeding (1)       CHOLECYSTECTOMY  2000    COLONOSCOPY N/A 8/14/2019    Procedure: COLONOSCOPY;  Surgeon: Chaparro Mckeon MD;  Location: [facility] ENDOSCOPY;  Service: Gastroenterology    LAPAROSCOPIC LYSIS OF ADHESIONS  07/16/1992    Laparoscopy; lysis of adhesions (1)       LIVER BIOPSY  01/05/1998    Needle biopsy of liver 24574 (1)       LIVER TRANSPLANTATION  2003    Anesth, for liver transplant (1)       SALPINGO OOPHORECTOMY  1974    Salpingo-oophorectomy (1)       SHOULDER SURGERY  08/29/2007    Shoulder surgery procedure (1)       TOTAL ABDOMINAL HYSTERECTOMY  1968    Total abd hysterectomy (1)       TOTAL SHOULDER ARTHROPLASTY W/ DISTAL CLAVICLE EXCISION Right 10/10/2023    Procedure: RIGHT REVERSE TOTAL SHOULDER ARTHROPLASTY;  Surgeon: Frantz Rosa MD;  Location: [facility] OR;  Service: Orthopedics;  Laterality: Right;      General Information       Row Name 10/11/23 1410          Physical Therapy Time and Intention    Document Type evaluation  Admitted s/p R rTSA. NWB RUE.  -MS (r) JS (t) MS (c)     Mode of Treatment physical therapy  -MS (r) JS (t) MS (c)       Row Name 10/11/23 1410          General Information    Patient Profile Reviewed yes  -MS (r) JS (t) MS (c)     Prior Level of Function independent:;all household mobility;mod assist:;dressing;bathing  Uses rollator at home and FWW in community  -MS (r) JS (t) MS (c)     Existing Precautions/Restrictions fall;non-weight bearing;right;shoulder  -MS (r) JS (t) MS (c)     Barriers to Rehab previous functional deficit  -MS (r) JS (t) MS (c)       Row Name 10/11/23 1410          Living Environment    People in Home alone;other (see comments)  Sitter M-F 8 hours a day  -MS (r) JS (t) MS (c)       Row Name 10/11/23 1410          Home Main Entrance    Number of Stairs, Main Entrance none;other (see comments)   Ramp  -MS (r) JS (t) MS (c)     Stair Railings, Main Entrance railings on both sides of stairs  -MS (r) JS (t) MS (c)       Row Name 10/11/23 1410          Stairs Within Home, Primary    Number of Stairs, Within Home, Primary none  -MS (r) JS (t) MS (c)       Row Name 10/11/23 1410          Cognition    Orientation Status (Cognition) oriented x 4  -MS (r) JS (t) MS (c)       Row Name 10/11/23 1410          Safety Issues, Functional Mobility    Safety Issues Affecting Function (Mobility) sequencing abilities;positioning of assistive device  -MS (r) JS (t) MS (c)     Impairments Affecting Function (Mobility) balance;endurance/activity tolerance;grasp;motor control;motor planning;postural/trunk control;range of motion (ROM);strength  -MS (r) JS (t) MS (c)     Comment, Safety Issues/Impairments (Mobility) Step over tub with grab bar and shower chair. Grab bar and raised toilet seat. Plans to sleep in lift chair at home.  -MS (r) JS (t) MS (c)               User Key  (r) = Recorded By, (t) = Taken By, (c) = Cosigned By      Initials Name Provider Type    Meredith Garza, PT, DPT, NCS Physical Therapist    Saurav Chacon, STONEY Student PT Student                   Mobility       Row Name 10/11/23 1410          Sit-Stand Transfer    Sit-Stand Brown (Transfers) verbal cues;minimum assist (75% patient effort)  -MS (r) JS (t) MS (c)     Assistive Device (Sit-Stand Transfers) cane, quad  -MS (r) JS (t) MS (c)       Row Name 10/11/23 1410          Gait/Stairs (Locomotion)    Brown Level (Gait) minimum assist (75% patient effort)  -MS (r) JS (t) MS (c)     Assistive Device (Gait) cane, quad  -MS (r) JS (t) MS (c)     Distance in Feet (Gait) 3  -MS (r) JS (t) MS (c)     Deviations/Abnormal Patterns (Gait) bilateral deviations;bonnie decreased;festinating/shuffling;gait speed decreased;stride length decreased  -MS (r) JS (t) MS (c)     Bilateral Gait Deviations heel strike decreased;forward flexed posture  -MS  (r) JS (t) MS (c)       Row Name 10/11/23 1410          Mobility    Extremity Weight-bearing Status right upper extremity  -MS (r) JS (t) MS (c)     Right Upper Extremity (Weight-bearing Status) non weight-bearing (NWB)  -MS (r) JS (t) MS (c)               User Key  (r) = Recorded By, (t) = Taken By, (c) = Cosigned By      Initials Name Provider Type    Meredith Garza JORDI, PT, DPT, NCS Physical Therapist    Saurav Chacon, PT Student PT Student                   Obj/Interventions       Row Name 10/11/23 1410          Range of Motion Comprehensive    Comment, General Range of Motion BLE WFL  -MS (r) JS (t) MS (c)       Row Name 10/11/23 1410          Strength Comprehensive (MMT)    Comment, General Manual Muscle Testing (MMT) Assessment LUE: L elbow flexion/extension 4/5, wrist extension 4/5,  3+/5. BLE: Grossly 4/5  -MS (r) JS (t) MS (c)       Row Name 10/11/23 1410          Motor Skills    Motor Skills neuro-muscular function  -MS (r) JS (t) MS (c)     Neuromuscular Function tremor, resting;left;upper extremity;other (see comments)  Pt has Parkinson's.  -MS (r) JS (t) MS (c)       Row Name 10/11/23 1410          Balance    Balance Assessment sitting static balance;sitting dynamic balance;standing static balance;standing dynamic balance  -MS (r) JS (t) MS (c)     Static Sitting Balance supervision;verbal cues  -MS (r) JS (t) MS (c)     Dynamic Sitting Balance standby assist;verbal cues  -MS (r) JS (t) MS (c)     Position, Sitting Balance sitting in chair  -MS (r) JS (t) MS (c)     Static Standing Balance contact guard;verbal cues  -MS (r) JS (t) MS (c)     Dynamic Standing Balance minimal assist;verbal cues  -MS (r) JS (t) MS (c)     Position/Device Used, Standing Balance supported  -MS (r) JS (t) MS (c)     Comment, Balance Patient demonstrates severe kyphotic posture and is able to correct somewhat upon cueing in seated. In standing she required min A to stand with midline posture. Her COM is  largely  anterior to her TORRI making her a fall risk.  -MS (r) JS (t) MS (c)       Row Name 10/11/23 1410          Sensory Assessment (Somatosensory)    Sensory Assessment (Somatosensory) bilateral LE;right UE  -MS (r) JS (t) MS (c)     Right UE Sensory Assessment other (see comments);absent  Unable to feel thumb and fingers d/t nerve block.  -MS (r) JS (t) MS (c)     Bilateral LE Sensory Assessment intact  -MS (r) JS (t) MS (c)               User Key  (r) = Recorded By, (t) = Taken By, (c) = Cosigned By      Initials Name Provider Type    Meredith Garza JORDI, PT, DPT, NCS Physical Therapist    Saurav Chacon, PT Student PT Student                   Goals/Plan       Row Name 10/11/23 1410          Bed Mobility Goal 1 (PT)    Activity/Assistive Device (Bed Mobility Goal 1, PT) sit to supine/supine to sit  -MS (r) JS (t) MS (c)     Rosine Level/Cues Needed (Bed Mobility Goal 1, PT) contact guard required  -MS (r) JS (t) MS (c)     Time Frame (Bed Mobility Goal 1, PT) long term goal (LTG);by discharge  -MS (r) JS (t) MS (c)       Row Name 10/11/23 1410          Transfer Goal 1 (PT)    Activity/Assistive Device (Transfer Goal 1, PT) sit-to-stand/stand-to-sit  -MS (r) JS (t) MS (c)     Rosine Level/Cues Needed (Transfer Goal 1, PT) standby assist  -MS (r) JS (t) MS (c)     Time Frame (Transfer Goal 1, PT) long term goal (LTG);by discharge  -MS (r) JS (t) MS (c)     Progress/Outcome (Transfer Goal 1, PT) new goal  -MS (r) JS (t) MS (c)       Row Name 10/11/23 1410          Gait Training Goal 1 (PT)    Activity/Assistive Device (Gait Training Goal 1, PT) gait (walking locomotion);assistive device use;decrease fall risk;improve balance and speed;increase endurance/gait distance;cane, quad  -MS (r) JS (t) MS (c)     Rosine Level (Gait Training Goal 1, PT) contact guard required  -MS (r) JS (t) MS (c)     Distance (Gait Training Goal 1, PT) 20  -MS (r) JS (t) MS (c)     Time Frame (Gait Training Goal 1, PT) long  term goal (LTG);by discharge  -MS (r) JS (t) MS (c)     Progress/Outcome (Gait Training Goal 1, PT) new goal  -MS (r) JS (t) MS (c)       Row Name 10/11/23 1410          Therapy Assessment/Plan (PT)    Planned Therapy Interventions (PT) balance training;bed mobility training;gait training;postural re-education;patient/family education;orthotic fitting/training;strengthening;transfer training  -MS (r) JS (t) MS (c)               User Key  (r) = Recorded By, (t) = Taken By, (c) = Cosigned By      Initials Name Provider Type    MS Meredith Nance JORDI, PT, DPT, NCS Physical Therapist    Saurav Chacon, PT Student PT Student                   Clinical Impression       Row Name 10/11/23 1410          Pain    Pretreatment Pain Rating 0/10 - no pain  -MS (r) JS (t) MS (c)     Posttreatment Pain Rating 0/10 - no pain  -MS (r) JS (t) MS (c)     Pain Intervention(s) Repositioned  -MS (r) JS (t) MS (c)       Row Name 10/11/23 1410          Plan of Care Review    Plan of Care Reviewed With patient  -MS (r) JS (t) MS (c)     Progress no change  -MS (r) JS (t) MS (c)     Outcome Evaluation PT eval complete. Pt is A&Ox4 with no complaints of pain this visit. She is seated with sling on RUE on arrival. Pt educated on RTSA protocol and sling donning/doffing. and wear schedule. She presents with severe kyphosis in seated and requires min A for sit>stand and for standing balance at times. Patient has intermittent tremors of LUE this visit. She requires consistent cueing to correct kyphotic posture. She requires increased time for ambulation and verbal cueing for sequencing of movements with ambulation. She ambulated 3 feet with min A at times to keep trunk as extended as possible and to keep midline posture. Pt typically ambulates with rollator, and is transitioning to quad cane usage status post R RTSA. She has some tremor in LUE when using quad cane. Her anterior center of mass decreases her balance and makes her a fall risk, along  with use of new assistive device that is less stable. PT services necessary for assistive device training and to decrease fall risk. Patient wishes to d/c to swing bed. Anticipate discharge to subacute rehab.  -MS (r) JS (t) MS (c)       Row Name 10/11/23 1410          Therapy Assessment/Plan (PT)    Patient/Family Therapy Goals Statement (PT) Go to swing bed.  -MS (r) JS (t) MS (c)     Rehab Potential (PT) good, to achieve stated therapy goals  -MS (r) JS (t) MS (c)     Criteria for Skilled Interventions Met (PT) yes;meets criteria;skilled treatment is necessary  -MS (r) JS (t) MS (c)     Therapy Frequency (PT) 2 times/day  -MS (r) JS (t) MS (c)     Predicted Duration of Therapy Intervention (PT) Until discharge  -MS (r) JS (t) MS (c)       Row Name 10/11/23 1410          Vital Signs    O2 Delivery Pre Treatment room air  -MS (r) JS (t) MS (c)     O2 Delivery Intra Treatment room air  -MS (r) JS (t) MS (c)     O2 Delivery Post Treatment room air  -MS (r) JS (t) MS (c)     Pre Patient Position Sitting  -MS (r) JS (t) MS (c)     Intra Patient Position Standing  -MS (r) JS (t) MS (c)     Post Patient Position Sitting  -MS (r) JS (t) MS (c)       Row Name 10/11/23 1410          Positioning and Restraints    Pre-Treatment Position sitting in chair/recliner  -MS (r) JS (t) MS (c)     Post Treatment Position chair  -MS (r) JS (t) MS (c)     In Chair sitting;reclined;call light within reach;encouraged to call for assist;with brace;legs elevated  -MS (r) JS (t) MS (c)               User Key  (r) = Recorded By, (t) = Taken By, (c) = Cosigned By      Initials Name Provider Type    Meredith Garza, PT, DPT, NCS Physical Therapist    Saurav Chacon, PT Student PT Student                   Outcome Measures       Row Name 10/11/23 1545 10/11/23 2271       How much help from another person do you currently need...    Turning from your back to your side while in flat bed without using bedrails? 3  -MS (r) JS (t) MS (c) 3   -SC    Moving from lying on back to sitting on the side of a flat bed without bedrails? 2  -MS (r) JS (t) MS (c) 2  -SC    Moving to and from a bed to a chair (including a wheelchair)? 3  -MS (r) JS (t) MS (c) 2  -SC    Standing up from a chair using your arms (e.g., wheelchair, bedside chair)? 3  -MS (r) JS (t) MS (c) 2  -SC    Climbing 3-5 steps with a railing? 2  -MS (r) JS (t) MS (c) 2  -SC    To walk in hospital room? 3  -MS (r) JS (t) MS (c) 2  -SC    AM-PAC 6 Clicks Score (PT) 16  -MS (r) JS (t) 13  -SC    Highest level of mobility 5 --> Static standing  -MS (r) JS (t) 4 --> Transferred to chair/commode  -SC      Row Name 10/11/23 1545 10/11/23 0736       Functional Assessment    Outcome Measure Options AM-PAC 6 Clicks Basic Mobility (PT)  -MS (r) JS (t) MS (c) AM-PAC 6 Clicks Daily Activity (OT)  -              User Key  (r) = Recorded By, (t) = Taken By, (c) = Cosigned By      Initials Name Provider Type    MS Nance Meredith R, PT, DPT, NCS Physical Therapist    Natasha Stovall, RN Registered Nurse    Anuradha Anderson, OTR/L Occupational Therapist    Saurav Chacon, PT Student PT Student                                 Physical Therapy Education       Title: PT OT SLP Therapies (In Progress)       Topic: Physical Therapy (Done)       Point: Mobility training (Done)       Learning Progress Summary             Patient Acceptance, E, MANUEL,DU by  at 10/11/2023 1410    Comment: Verbal cueing for transfers with quad cane. Verbal cueing for sequencing with gait and for posture. Brace donning/doffing/wear schedule. WB precautions RUE.                         Point: Body mechanics (Done)       Learning Progress Summary             Patient Acceptance, E, VU,DU by  at 10/11/2023 1410    Comment: Verbal cueing for transfers with quad cane. Verbal cueing for sequencing with gait and for posture. Brace donning/doffing/wear schedule. WB precautions RUE.                         Point: Precautions (Done)        Learning Progress Summary             Patient Acceptance, E, VU,DU by LIAM at 10/11/2023 1410    Comment: Verbal cueing for transfers with quad cane. Verbal cueing for sequencing with gait and for posture. Brace donning/doffing/wear schedule. WB precautions RUE.                                         User Key       Initials Effective Dates Name Provider Type Discipline     07/13/23 -  Saurav Moise, PT Student PT Student PT                  PT Recommendation and Plan  Planned Therapy Interventions (PT): balance training, bed mobility training, gait training, postural re-education, patient/family education, orthotic fitting/training, strengthening, transfer training  Plan of Care Reviewed With: patient  Progress: no change  Outcome Evaluation: PT eval complete. Pt is A&Ox4 with no complaints of pain this visit. She is seated with sling on RUE on arrival. Pt educated on RTSA protocol and sling donning/doffing. and wear schedule. She presents with severe kyphosis in seated and requires min A for sit>stand and for standing balance at times. Patient has intermittent tremors of LUE this visit. She requires consistent cueing to correct kyphotic posture. She requires increased time for ambulation and verbal cueing for sequencing of movements with ambulation. She ambulated 3 feet with min A at times to keep trunk as extended as possible and to keep midline posture. Pt typically ambulates with rollator, and is transitioning to quad cane usage status post R RTSA. She has some tremor in LUE when using quad cane. Her anterior center of mass decreases her balance and makes her a fall risk, along with use of new assistive device that is less stable. PT services necessary for assistive device training and to decrease fall risk. Patient wishes to d/c to swing bed. Anticipate discharge to subacute rehab.     Time Calculation:         PT Charges       Row Name 10/11/23 1410             Time Calculation    Start Time 1410  9  mins spent in chart  -MS (r) JS (t) MS (c)      Stop Time 1450  -MS (r) JS (t) MS (c)      Time Calculation (min) 40 min  -MS (r) JS (t)      PT Received On 10/11/23  -MS (r) JS (t) MS (c)      PT Goal Re-Cert Due Date 10/21/23  -MS (r) JS (t) MS (c)         Untimed Charges    PT Eval/Re-eval Minutes 49  -MS (r) JS (t) MS (c)         Total Minutes    Untimed Charges Total Minutes 49  -MS (r) JS (t)       Total Minutes 49  -MS (r) JS (t)                User Key  (r) = Recorded By, (t) = Taken By, (c) = Cosigned By      Initials Name Provider Type    Meredith Garza, PT, DPT, NCS Physical Therapist    Saurav Chacon, PT Student PT Student                      PT G-Codes  Outcome Measure Options: AM-PAC 6 Clicks Basic Mobility (PT)  AM-PAC 6 Clicks Score (PT): 16  AM-PAC 6 Clicks Score (OT): 14  PT Discharge Summary  Anticipated Discharge Disposition (PT): sub acute care setting    Saurav Moise PT Student  10/11/2023      Electronically signed by Meredith Nance, PT, DPT, NCS at 10/11/23 5343

## 2023-10-11 NOTE — ANESTHESIA POSTPROCEDURE EVALUATION
"Patient: Dora Velazquez    Procedure Summary       Date: 10/10/23 Room / Location:  [facility] 09 /  [facility]    Anesthesia Start: 1843 Anesthesia Stop: 2005    Procedure: RIGHT REVERSE TOTAL SHOULDER ARTHROPLASTY (Right: Shoulder) Diagnosis:       Right rotator cuff tear arthropathy      (M12.811)    Surgeons: Frantz Rosa MD Provider: Bhupinder Devine CRNA    Anesthesia Type: general with block ASA Status: 3            Anesthesia Type: general with block    Vitals  Vitals Value Taken Time   /84 10/10/23 2030   Temp 97.3 øF (36.3 øC) 10/10/23 2002   Pulse 82 10/10/23 2033   Resp 16 10/10/23 2030   SpO2 95 % 10/10/23 2033   Vitals shown include unfiled device data.        Post Anesthesia Care and Evaluation    Patient location during evaluation: PHASE II  Patient participation: complete - patient participated  Level of consciousness: awake and alert  Pain score: 0  Pain management: adequate    Airway patency: patent  Anesthetic complications: No anesthetic complications  PONV Status: none  Cardiovascular status: stable and acceptable  Respiratory status: acceptable  Hydration status: acceptable    Comments: Blood pressure 148/75, pulse 81, temperature 97.9 øF (36.6 øC), temperature source Oral, resp. rate 14, height 157 cm (61.81\"), weight 54.6 kg (120 lb 4.8 oz), last menstrual period 07/18/1971, SpO2 99%,   No anesthesia care post op    "

## 2023-10-11 NOTE — CONSULTS
[facility] Medicine Consult  Consults    Date of Admission: 10/10/2023  Date of Consult: 10/11/23    Primary Care Physician: Deonna Cornejo MD  Referring Physician: Dr. Rosa  Chief Complaint/Reason for Consultation: Assist in medical management    Subjective   History of Present Illness  Patient is a 78-year-old woman who carries history of diabetes, heart disease, hypertension, hepatitis C, Parkinson's disease who was admitted for right reverse TSA for right shoulder arthropathy.  Hospitalist service consulted to assist in medical management.  She had her procedure on October 10 by Dr. Rosa.    Patient is hemodynamically stable.  She had hypertensive blood pressure reading  Her oxygenation has improved and now back to room air with saturation in the upper 90s.  I reviewed her basic metabolic panel and H&H.  She has hemoglobin of 10 (11.9)  She has mild hyperglycemia 135.  Her calcium is 8.4.  She previously had elevated creatinine at 1.23.  It improved to 0.81 today.  X-ray of right shoulder on October 10 interpreted by radiologist as good anatomic alignment with no evidence of complication.      atorvastatin, 20 mg, Oral, Nightly  carbidopa-levodopa, 2 tablet, Oral, TID  ceFAZolin, 1,000 mg, Intravenous, Q8H  famotidine, 40 mg, Oral, Daily  furosemide, 40 mg, Oral, Daily  gabapentin, 100 mg, Oral, Nightly  isosorbide mononitrate, 120 mg, Oral, Daily  linagliptin, 5 mg, Oral, Daily  meloxicam, 15 mg, Oral, Daily  metoprolol tartrate, 25 mg, Oral, Q12H  rivaroxaban, 10 mg, Oral, Daily With Dinner  sirolimus, 0.5 mg, Oral, Daily  sodium chloride, 10 mL, Intravenous, Q12H  spironolactone, 25 mg, Oral, Daily      I compared home medications with current medications.  Other medications that have not been restarted includes Januvia  Loratadine  Tramadol  Vitamin D3  Noted from home medication the patient was on Coumadin and Xarelto.  Currently patient is only on rivaroxaban.  I  am not sure why with she be on both medications for listed at home.  I also noted that she is on meloxicam 15 mg daily now.  This increases her risk of bleeding while on Xarelto.  She confirmed with me that she is not only taking Xarelto for history of blood clot.  She said she had some bleeding episodes with Coumadin that she was taken off of Coumadin.      Probably can consider putting back on tramadol on as-needed basis instead of meloxicam to decrease risks of bleeding.    She told me that she is not in any pain.  She denies any shortness of breath or difficulty breathing  She denies any chest pain, palpitation, swelling  She denies any nausea or vomiting at present time but had an episode she said last night.  She denies any bowel disturbance  She said that she peed all night last night.  Denies any painful urination.     Told me she lives by herself but has a nursing care 8 hours a day on weekdays.  Denies any falls since she has this nursing care.    The only thing that bothers her is the sling/immobilizer she has on her right upper extremity that is wrapped around her neck area.  Review of Systems   Otherwise complete ROS is negative except as mentioned above.    Past Medical History:   Past Medical History:   Diagnosis Date    Arthritis     Backache     Bilateral pleural effusion     Blood in feces     Blood in feces symptom    Capsulitis     Cardiomyopathy      HFpEF, improved       Chest pain     CHF (congestive heart failure)     Chronic anemia     Chronic hepatitis C     Degenerative joint disease involving multiple joints     Diabetes mellitus     Dilated cardiomyopathy     Dyslipidemia     Dyspnea     Edema of leg     Epistaxis     Essential hypertension     Fibromyalgia, primary     GERD (gastroesophageal reflux disease)     H/O endoscopy 06/30/2014    Colon endoscopy [id number]    Headache     Hemorrhoids     internal & external    History of liver recipient     S/P done at [facility] 2003        History of transfusion     Hyperlipemia     Hyperlipidemia     Hypertension     Hypokalemia     Left bundle branch block     Metatarsalgia     Metatarsalgia - with fat pad atrophie       Pap smear for cervical cancer screening 02/09/1996    Parkinson's disease     Paroxysmal atrial fibrillation     Polyp of sigmoid colon     Salmonella arthritis     Transient cerebral ischemia     Unspecified     Past Surgical History:  Past Surgical History:   Procedure Laterality Date    BACK SURGERY  1995    Back Surgery (2)       CARDIAC CATHETERIZATION  11/03/2005    Orders Not Yet Performed: 0        CARPAL TUNNEL RELEASE  02/12/2007    Carpal tunnel surgery (1)      CAUTERIZATION NASAL BLEEDERS  03/09/2000    Control nose/throat bleeding (1)       CHOLECYSTECTOMY  2000    COLONOSCOPY N/A 8/14/2019    Procedure: COLONOSCOPY;  Surgeon: Chaparro Mckeon MD;  Location: [facility] ENDOSCOPY;  Service: Gastroenterology    LAPAROSCOPIC LYSIS OF ADHESIONS  07/16/1992    Laparoscopy; lysis of adhesions (1)       LIVER BIOPSY  01/05/1998    Needle biopsy of liver 08909 (1)       LIVER TRANSPLANTATION  2003    Anesth, for liver transplant (1)       SALPINGO OOPHORECTOMY  1974    Salpingo-oophorectomy (1)       SHOULDER SURGERY  08/29/2007    Shoulder surgery procedure (1)       TOTAL ABDOMINAL HYSTERECTOMY  1968    Total abd hysterectomy (1)       TOTAL SHOULDER ARTHROPLASTY W/ DISTAL CLAVICLE EXCISION Right 10/10/2023    Procedure: RIGHT REVERSE TOTAL SHOULDER ARTHROPLASTY;  Surgeon: Frantz Rosa MD;  [facility];  Service: Orthopedics;  Laterality: Right;     Social History:  reports that she has never smoked. She has never used smokeless tobacco. She reports that she does not drink alcohol and does not use drugs.    Family History: family history includes Cancer in an other family member; Cholelithiasis in an other family member; Heart attack in her father; Heart disease in an other family member; Hypertension in an  other family member; Stroke in her father.     Allergies:   Allergies   Allergen Reactions    Lortab [Hydrocodone-Acetaminophen] Other (See Comments)     Can cause problems for liver has had transplant     Penicillins Anaphylaxis and Hives    Adhesive Tape Other (See Comments)     IV tape causes skin tears  Pt stated it breaks her out    Tape Other (See Comments)     IV tape causes skin tears  Pt stated it breaks her out    Codeine Hives     Medications: Scheduled Meds:atorvastatin, 20 mg, Oral, Nightly  carbidopa-levodopa, 2 tablet, Oral, TID  ceFAZolin, 1,000 mg, Intravenous, Q8H  famotidine, 40 mg, Oral, Daily  furosemide, 40 mg, Oral, Daily  gabapentin, 100 mg, Oral, Nightly  isosorbide mononitrate, 120 mg, Oral, Daily  linagliptin, 5 mg, Oral, Daily  meloxicam, 15 mg, Oral, Daily  metoprolol tartrate, 25 mg, Oral, Q12H  rivaroxaban, 10 mg, Oral, Daily With Dinner  sirolimus, 0.5 mg, Oral, Daily  sodium chloride, 10 mL, Intravenous, Q12H  spironolactone, 25 mg, Oral, Daily      Continuous Infusions:lactated ringers, 1,000 mL, Last Rate: 25 mL/hr at 10/10/23 1843  lactated ringers, 100 mL/hr  lactated ringers, 100 mL/hr, Last Rate: 100 mL/hr (10/10/23 2323)      PRN Meds:.  acetaminophen **OR** acetaminophen    diphenhydrAMINE **OR** diphenhydrAMINE    docusate sodium    magnesium hydroxide    Morphine **AND** naloxone    ondansetron **OR** ondansetron    oxyCODONE-acetaminophen    oxyCODONE-acetaminophen    promethazine **OR** promethazine    sodium chloride    sodium chloride    I have utilized all available immediate resources to obtain, update, or review the patient's current medications (including all prescriptions, over-the-counter products, herbals, cannabis/cannabidiol products, and vitamin/mineral/dietary (nutritional) supplements).     Objective   Objective    Physical Exam:   Temp:  [97.3 °F (36.3 °C)-97.9 °F (36.6 °C)] 97.9 °F (36.6 °C)  Heart Rate:  [64-89] 70  Resp:  [13-21] 16  BP:  (127-160)/() 141/66  FiO2 (%):  [100 %] 100 %  Physical Exam  Pleasant woman  No distress  She has resting tremors consistent with history of Parkinson's disease.    GEN: Awake, alert, interactive, in NAD  HEENT: Atraumatic, PERRLA, EOMI, Anicteric, Trachea midline  Lungs: CTAB, no wheezing/rales/rhonchi  Heart: RRR, +S1/s2, no rub  ABD: soft, nt/nd, +BS, no guarding/rebound  Extremities: atraumatic, no cyanosis, no edema  Skin: no rashes or lesions  Neuro: AAOx3, no focal deficits  Psych: normal mood & affect    Results Reviewed:  I have personally reviewed current lab, radiology, and data and agree with results.  Lab Results (last 24 hours)       Procedure Component Value Units Date/Time    Basic Metabolic Panel [[id number]]  (Abnormal) Collected: 10/11/23 0448    Specimen: Blood Updated: 10/11/23 0544     Glucose 135 mg/dL      BUN 16 mg/dL      Creatinine 0.81 mg/dL      Sodium 140 mmol/L      Potassium 4.2 mmol/L      Chloride 105 mmol/L      CO2 25.0 mmol/L      Calcium 8.4 mg/dL      BUN/Creatinine Ratio 19.8     Anion Gap 10.0 mmol/L      eGFR 73.9 mL/min/1.73     Narrative:      GFR Normal >60  Chronic Kidney Disease <60  Kidney Failure <15    The GFR formula is only valid for adults with stable renal function between ages 18 and 70.    Hemoglobin & Hematocrit, Blood [[id number]]  (Abnormal) Collected: 10/11/23 0448    Specimen: Blood Updated: 10/11/23 0527     Hemoglobin 10.0 g/dL      Hematocrit 33.5 %     POC Glucose Once [[id number]]  (Abnormal) Collected: 10/10/23 2007    Specimen: Blood Updated: 10/10/23 2019     Glucose 134 mg/dL      Comment: : [id number] Benny TinaMeter ID: [id number]       Comprehensive Metabolic Panel [[id number]]  (Abnormal) Collected: 10/10/23 1357    Specimen: Blood Updated: 10/10/23 1431     Glucose 144 mg/dL      BUN 18 mg/dL      Creatinine 0.92 mg/dL      Sodium 138 mmol/L      Potassium 4.1 mmol/L      Chloride 100 mmol/L      CO2 28.0 mmol/L      Calcium 9.5 mg/dL       Total Protein 6.4 g/dL      Albumin 3.8 g/dL      ALT (SGPT) 11 U/L      AST (SGOT) 19 U/L      Alkaline Phosphatase 143 U/L      Total Bilirubin 0.9 mg/dL      Globulin 2.6 gm/dL      A/G Ratio 1.5 g/dL      BUN/Creatinine Ratio 19.6     Anion Gap 10.0 mmol/L      eGFR 63.5 mL/min/1.73     Narrative:      GFR Normal >60  Chronic Kidney Disease <60  Kidney Failure <15    The GFR formula is only valid for adults with stable renal function between ages 18 and 70.    POC Glucose Once [[id number]]  (Abnormal) Collected: 10/10/23 1354    Specimen: Blood Updated: 10/10/23 1405     Glucose 142 mg/dL      Comment: : [id number] Robert JennyMeter ID: [id number]             Imaging Results (Last 24 Hours)       Procedure Component Value Units Date/Time    XR Shoulder 2+ View Right [[id number]] Collected: 10/10/23 2031     Updated: 10/10/23 2034    Narrative:      EXAMINATION: XR SHOULDER 2+ VW RIGHT-  10/10/2023 8:31 PM CDT     HISTORY: Postop     FINDINGS: 2 view exam of the right shoulder reveal the patient is  undergone reverse right total shoulder arthroplasty. There is good  anatomic alignment with no evidence of complication.     This report was signed and finalized on 10/10/2023 8:31 PM CDT by Dr. Panda Kim MD.               Assessment / Plan   Assessment:   Active Hospital Problems    Diagnosis     **Right rotator cuff tear arthropathy             Medical Decision Making  Number and Complexity of problems:   Status post right reverse shoulder arthroplasty on October 10  Hypertension  Chronic systolic heart failure not in decompensation.  Diabetes mellitus type 2 with hyperglycemia, on insulin treated  Chronic anticoagulation.  History of blood clot  Parkinsons disease  Hyperlipidemia    Treatment Plan  Reviewed current medications and agree with the with exception consider discontinuing meloxicam and placed on as needed tramadol.  Have confirmed that she only takes Xarelto and not Coumadin with  Xarelto.  She had prior bleeding that she had to stop Coumadin.  Postoperative care per primary provider  I reviewed note from a prior cardiology visit.  There are some limitation in guideline directed medical treatment as outlined below.  Continue on statin, metoprolol tartrate (ideally would have been on carvedilol but for some reason on metoprolol tartrate) consider changing to succinate), spironolactone  Patient on isosorbide mononitrate and Lasix.  Monitor fluid status.  Noted creatinine has improved.  In reference to diabetes patient is on linagliptin (must be a substitute for Januvia).  Consider changing to empagliflozin or  Farxiga.  Check A1c level  Continue carbidopa levodopa for Parkinson's disease.  Xarelto will serve as DVT prophylaxis  Grossly euvolemic on current meds      atorvastatin, 20 mg, Oral, Nightly  carbidopa-levodopa, 2 tablet, Oral, TID  ceFAZolin, 1,000 mg, Intravenous, Q8H  famotidine, 40 mg, Oral, Daily  furosemide, 40 mg, Oral, Daily  gabapentin, 100 mg, Oral, Nightly  isosorbide mononitrate, 120 mg, Oral, Daily  linagliptin, 5 mg, Oral, Daily  meloxicam, 15 mg, Oral, Daily  metoprolol tartrate, 25 mg, Oral, Q12H  rivaroxaban, 10 mg, Oral, Daily With Dinner  sirolimus, 0.5 mg, Oral, Daily  sodium chloride, 10 mL, Intravenous, Q12H  spironolactone, 25 mg, Oral, Daily      Changing a lot of medications all at the same time probably not the best thing to do.  I will start at least on changing to carvedilol in reference to guideline directed medical treatment.  As I was changing it, I encountered the carvedilol has drug interaction with sirolimus but not with metoprolol succinate.  Therefore I just use metoprolol succinate.    I will also DC the meloxicam and placed on tramadol.    Encourage patient on incentive spirometry.    Conditions and Status      fair     [facility] Data  External documents reviewed:  Results for orders placed in visit on 09/15/22    Adult Transthoracic Echo Complete W/  Cont if Necessary Per Protocol    Interpretation Summary  · Calculated left ventricular 3D EF = 46% Estimated left ventricular EF = 45% Left ventricular ejection fraction appears to be 41 - 45%. Left ventricular systolic function is low normal.  · The left ventricular cavity is borderline dilated.  · Left ventricular diastolic dysfunction is noted.  · Left atrial volume is severely increased.  · Estimated right ventricular systolic pressure from tricuspid regurgitation is normal (<35 mmHg).    Cardiology note from November 7, 2022 indicates the following in reference to chronic heart failure with reduced ejection fraction:      BETA-BLOCKER: Carvedilol 25 mg twice daily  ACE/ARB: Cant take due to immunosuppressive medication  ENTRESTO: not indicated  DIURETIC: Lasix 40 mg daily  SGL2I: not indicated  ALDOSTERONE ANTAGONIST: Spironolactone 50 mg daily  IMDUR/HYDRALAZINE: Imdur 120 mg daily  DIGOXIN: N/A      Cardiac tracing (EKG, telemetry) interpretation: EKG showed normal sinus rhythm, left fascicular block, ST-T wave abnormality to 3, aVF V5 V6 which from comparing from January 13, 2023, computer) left bundle branch block..  It is my understanding that prior to patient undergoing surgery from the note on admit, patient had  clearance for surgery    Radiology interpretation: Reviewed as per interpretation by radiologist  Labs reviewed: Reviewed as above  Any tests that were considered but not ordered: None     Decision rules/scores evaluated (example [id number], [place], etc): None     Discussed with: Patient and nursing staff     Care Planning  Shared decision making: Patient and attending  Code status and discussions: Full code    Disposition  Social Determinants of Health that impact treatment or disposition: None identified at this time.  Patient lives by herself and has nursing care 8 hours a day during weekdays  I expect the patient to be discharged by the primary service.     I confirmed that the patient's  Advance Care Plan is present, code status is documented, or surrogate decision maker is listed in the patient's medical record.     The patient's surrogate decision maker is harini Paul    Patient seen and examined by me on   Electronically signed by David Katz MD, 10/11/23, 07:24 CDT.

## 2023-10-11 NOTE — DISCHARGE PLACEMENT REQUEST
"Leonie Velazquez (78 y.o. Female)       Date of Birth   1945    Social Security Number       Address   [address] [place] [address]    Home Phone   [phone]    MRN   [id number]       Latter day   Lutheran    Marital Status   Single                            Admission Date   10/10/23    Admission Type   Elective    Admitting Provider   Frantz Rosa MD    Attending Provider   Frantz Rosa MD    Department, Room/Bed   [facility] 3A, 356/1       Discharge Date       Discharge Disposition       Discharge Destination                                 Attending Provider: Frantz Rosa MD    Allergies: Lortab [Hydrocodone-acetaminophen], Penicillins, Adhesive Tape, Tape, Codeine    Isolation: None   Infection: None   Code Status: CPR    Ht: 157 cm (61.81\")   Wt: 54.6 kg (120 lb 4.8 oz)    Admission Cmt: None   Principal Problem: Right rotator cuff tear arthropathy [M75.101,M12.811]                   Active Insurance as of 10/10/2023       Primary Coverage       Payor Plan Insurance Group Employer/Plan Group    ANTHEM MEDICARE REPLACEMENT ANTHEM MEDICARE ADVANTAGE [id number]       Payor Plan Address Payor Plan Phone Number Payor Plan Fax Number Effective Dates    [address] [phone]  2/1/2021 - None Entered    [facility] [address]         Subscriber Name Subscriber Birth Date Member ID       LEONIE VELAZQUEZ 1945 [id number]               Secondary Coverage       Payor Plan Insurance Group Employer/Plan Group    [place]        Payor Plan Address Payor Plan Phone Number Payor Plan Fax Number Effective Dates    [address] [phone]  8/1/2022 - None Entered    [facility] [id number]         Subscriber Name Subscriber Birth Date Member ID       LEONIE VELAZQUEZ 1945 [id number]                     Emergency Contacts        (Rel.) Home Phone Work Phone Mobile Phone    Cele Velazquez (Son) [phone] -- " [phone]    Rica Ni (Care Giver) -- -- [phone]              Insurance Information                  ANTHEM MEDICARE REPLACEMENT/ANTHEM MEDICARE ADVANTAGE Phone: [phone]    Subscriber: Dora Velazquez Subscriber#: [id number]    Group#: [id number] Precert#: --        [place] Phone: [phone]    Subscriber: Dora Velazquez Subscriber#: [id number]    Group#: -- Precert#: --             History & Physical        Frantz Rosa MD at 10/10/23 1342          Pt Name: Dora Velazquez  MRN: [id number]  YOB: 1945  Date of evaluation: 10/10/2023    H&P including current review of systems was updated in the paper chart and/or the document previously scanned into the record.  There have been no significant changes or new problems since the original evaluation.  The patient's problems continue and indications for contemplated procedure have not changed.    Electronically signed by Frantz Rosa MD on 10/10/2023 at 13:42 CDT    Electronically signed by Frantz Rosa MD at 10/10/23 1342       H&P signed by [employer] On[employer], [place] at 09/29/23 1046         [Media Unavailable] Scan on 10/10/2023 by [employer], [place]: RIGHT REVERSE TOTAL SHOULDER ARTHROPLASTY CARDIAC CLEARANCE, [facility], 10/10/2023          Electronically signed by [employer] On[employer], [place] at 09/29/23 1046       H&P signed by [employer], [place] at 09/29/23 1046         [Media Unavailable] Scan on 10/10/2023 by [employer], [place]: RIGHT SHOULDER ARTHROPLASTY HISTORY AND PHYSICAL, [facility], 10/10/2023          Electronically signed by [employer] Onbase, [place] at 09/29/23 1046       Vital Signs (last day)       Date/Time Temp Temp src Pulse Resp BP Patient Position SpO2    10/11/23 0818 97.6 (36.4) Oral 85 16 116/97 Sitting 96    10/11/23 0300 97.9 (36.6) Oral 70 16 141/66 Lying 98    10/11/23 0020 -- -- -- -- -- -- 97    10/10/23 2305 97.6 (36.4) Oral 78 15 131/77 Lying 99    10/10/23 2230 -- --  -- -- -- -- 96    10/10/23 2118 97.9 (36.6) Oral 81 14 148/75 Lying 99    10/10/23 2048 97.6 (36.4) Axillary 81 15 159/81 Lying 96    10/10/23 2030 -- -- 81 16 156/84 -- 93    10/10/23 2020 -- -- 89 19 147/78 -- 96    10/10/23 2015 -- -- 77 19 145/72 -- 100    10/10/23 2010 -- -- 75 15 150/75 -- 100    10/10/23 2005 -- -- 74 15 160/75 -- 99    10/10/23 2002 97.3 (36.3) Temporal 78 21 160/75 Lying 100    10/10/23 1755 -- -- 68 -- -- -- 97    10/10/23 1750 -- -- 71 -- -- -- 97    10/10/23 1745 -- -- 67 -- -- -- 96    10/10/23 1740 -- -- 65 -- -- -- 97    10/10/23 1735 -- -- 64 -- -- -- 98    10/10/23 1730 -- -- 66 -- -- -- 96    10/10/23 1725 -- -- 75 -- -- -- 97    10/10/23 1720 -- -- 72 -- -- -- 97    10/10/23 1715 -- -- 72 -- -- -- 96    10/10/23 1710 -- -- 71 -- 133/68 -- 96    10/10/23 1705 -- -- 75 -- -- -- 98    10/10/23 1700 -- -- 67 -- -- -- 97    10/10/23 1655 -- -- 66 -- 127/64 -- 98    10/10/23 1650 -- -- 64 -- -- -- 97    10/10/23 1645 -- -- 67 -- -- -- 97    10/10/23 1640 -- -- 78 -- 136/70 -- 97    10/10/23 1635 -- -- 74 -- -- -- 97    10/10/23 1630 -- -- 75 -- -- -- 98    10/10/23 1628 -- -- 69 -- -- -- 98    10/10/23 1627 -- -- 76 -- -- -- 99    10/10/23 16:26:45 -- -- 69 13 159/81 -- 98    10/10/23 1626 -- -- 68 -- -- -- 97    10/10/23 1625 -- -- 79 -- -- -- 98    10/10/23 1624 -- -- 88 -- 159/81 -- 97    10/10/23 1620 -- -- 69 -- -- -- 98    10/10/23 1615 -- -- 76 -- -- -- 99    10/10/23 1610 -- -- 79 -- -- -- 98    10/10/23 1338 -- -- -- -- 146/102 Sitting --    10/10/23 1337 97.8 (36.6) Temporal 76 14 159/113 Sitting 96             Operative/Procedure Notes (last 7 days)        Frantz Rosa MD at 10/10/23 2002          TOTAL SHOULDER REVERSE ARTHROPLASTY  Progress Note    Dora Velazquez  10/10/2023    Pre-op Diagnosis:   M12.811       Post-Op Diagnosis Codes:     * Right rotator cuff tear arthropathy [M75.101, M12.811]    Procedure/CPT® Codes:  NM ARTHROPLASTY GLENOHUMERAL JOINT  TOTAL SHOULDER [75505]      Procedure(s):  RIGHT REVERSE TOTAL SHOULDER ARTHROPLASTY        SURGICAL APPROACH: Deltopectoral    SURGICAL TECHNIQUE: Peel      Surgeon(s):  Frantz Rosa MD    Anesthesia: General with Block    Staff:   Circulator: Jose Monte RN  Scrub Person: Bhupinder Best; Ester Emery; Desire Melendrez         Estimated Blood Loss: <500ml    Urine Voided: * No values recorded between 10/10/2023  6:44 PM and 10/10/2023  7:50 PM *    Specimens:                None          Drains:   External Urinary Catheter (Active)       Findings: see op note         Complications: none          Frantz Rosa MD     Date: 10/10/2023  Time: 20:02 CDT          Electronically signed by Frantz Rosa MD at 10/10/23 2003       Frantz Rosa MD at 10/10/23 2005          Patient Name: Celena  MRN: [id number]  : 1945      DATE of SURGERY: 10/10/2023    SURGEON: Frantz Rosa MD    ASSISTANT: NONE    PREOPERATIVE DIAGNOSIS: Right Shoulder Rotator Cuff Tear Arthropathy     POSTOPERATIVE DIAGNOSIS:  Right Shoulder Rotator Cuff Tear Arthropathy     PROCEDURE PERFORMED:  Right Reverse Total Shoulder Arthroplasty    SURGICAL APPROACH: Deltopectoral    SURGICAL TECHNIQUE: Peel    IMPLANTS: [employer] Revers                Baseplate: small                Glenosphere: 36 + 4                Poly: + 6 metal spacer, + 3 constrained poly                Suture Cup: 36 neutral                             Stem: 10 mm [place] pressfit    ANESTHESIA USED: General endotracheal anesthesia, interscalene block    OPERATIVE INDICATIONS: 78 y.o. female with progressive loss of function and increasing pain of the upper extremity due to a massive irreparable tear of the rotator cuff.  She has had multiple cortisone injection of the shoulder over the years.  Due to loss of function and progressive pain, a reverse shoulder arthroplasty is planned to improve function and decrease pain.  She is  unhealthy and has a history of a liver transplant, heart failure, and kidney disease.  She understood the significant risk of postop complications but wished to proceed with surgery.  An MRI showed atrophy of the rotator cuff musculature.  The patient had an intact axillary nerve and functioning deltoid. Surgical evaluation was discussed and the patient wished to proceed understanding risks, benefits, and alternatives. The surgical indications were to relieve pain, improve function, and prevent future disability in regards to the shoulder pathology dictated in the above diagnoses.  Risks included, but were not limited to, that of anesthesia, bleeding, infection, pain, damage to local structures, postoperative dislocation, need for further surgery, instability, stiffness, failure of implants, and loss of function.    The patient has failed a combination of the following to improve pain and function: physical therapy >12 weeks, corticosteroid injections, NSAID’s, activity modification.     ESTIMATED BLOOD LOSS: 150 mL    DRAINS: none     COMPLICATIONS: none    SPECIMENS: none    PROCEDURE IN DETAIL:  The patient was seen in the preoperative holding room, once again the informed consent was reviewed with the patient and signed.  The site of surgery was marked with the patient's agreement.  After being transported to the operating room, a timeout was performed identifying the correct patient as well as the operative site.  Dose appropriate IV antibiotics were given prior to incision.  The patient was positioned in the beach chair position, all bony prominences were protected and a sterile prep and drape was performed.  The surgical site was draped with loban dressing.    A deltopectoral approach to the shoulder joint was utilized as soft tissue was dissected down the level of the cephalic vein which was taken laterally along with the deltoid.  The biceps tendon was located, tenodesed to the superior border of the  "pectoralis major tendon insertion.  The rotator interval was opened.  A tagging stitch was placed in the subscapularis and with progressive external rotation of the shoulder, the tendon and underlying capsule were peeled from the lesser tuberosity.  The humeral head was dislocated from the glenoid and strategic retractors were placed to protect the surrounding soft tissue.  The axillary nerve was palpated and protected, verified with a \"tug test.\"    Beginning at the apex of the humeral head, a starting reamer was introduced into the shaft of the humerus, followed by reaming with a 5 and 6 mm reamer.  The proximal humeral osteotomy guide was inserted and an osteotomy was performed at 135 degrees and 30 degrees of retroversion.  A protective plate was placed on the osteotomy site and attention was turned to the glenoid.      Again, strategic retractors were placed surrounding the glenoid, the axillary nerve was protected, and a complete capsulectomy was performed.  The labrum was excised.  A guidepin was placed in the inferior-central aspect of the glenoid, following by a reaming device, and drilling of the central peg hole.  A baseplate was impacted and superior, central, and inferior locking screws were inserted.  The glenosphere was then impacted without complication.    Attention was turned back to the humeral side where progressively sized broaches were inserted until a stable fit was achieved, followed by the metaphyseal reaming guide.  The metaphysis was reamed and a trial stem inserted.  Trial polyethylenes were placed in the suture cup until range of motion and stability were adequate.  The conjoined tendon showed increased tension. With traction of the shoulder, the entire scapula was translating without dissociation of the polyethylene.    Trial implants were removed, final implants impacted, and the shoulder was once again reduced showing excellent stability and range of motion.    The incision was " "thoroughly irrigated, followed by closure in layers.  The skin was closed with adhesive glue.  A sterile dressing and sling were placed.  Counts were correct.    The patient was awakened by anesthesia, transported to the recovery room in stable condition.    POSTOPERATIVE PLAN:  1) Admit for observation, pain control  2) Reverse total shoulder protocol    Electronically signed by Frantz Rosa MD on 10/10/2023 at 20:05 CDT        Electronically signed by Frantz Rosa MD at 10/10/23 2005          Physician Progress Notes (last 24 hours)        Bhupinder Hutchison PA-C at 10/11/23 0728            Orthopedic Surgery Progress Note    Dora Velazquez  10/11/2023      Subjective:     Systemic or Specific Complaints: resting In bed, pain controlled     Objective:     Patient Vitals for the past 24 hrs:   BP Temp Temp src Pulse Resp SpO2 Height Weight   10/11/23 0300 141/66 97.9 °F (36.6 °C) Oral 70 16 98 % -- --   10/11/23 0020 -- -- -- -- -- 97 % -- --   10/10/23 2305 131/77 97.6 °F (36.4 °C) Oral 78 15 99 % -- --   10/10/23 2230 -- -- -- -- -- 96 % -- --   10/10/23 2118 148/75 97.9 °F (36.6 °C) Oral 81 14 99 % -- --   10/10/23 2048 159/81 97.6 °F (36.4 °C) Axillary 81 15 96 % 157 cm (61.81\") 54.6 kg (120 lb 4.8 oz)   10/10/23 2030 156/84 -- -- 81 16 93 % -- --   10/10/23 2020 147/78 -- -- 89 19 96 % -- --   10/10/23 2015 145/72 -- -- 77 19 100 % -- --   10/10/23 2010 150/75 -- -- 75 15 100 % -- --   10/10/23 2005 160/75 -- -- 74 15 99 % -- --   10/10/23 2002 160/75 97.3 °F (36.3 °C) Temporal 78 21 100 % -- --   10/10/23 1755 -- -- -- 68 -- 97 % -- --   10/10/23 1750 -- -- -- 71 -- 97 % -- --   10/10/23 1745 -- -- -- 67 -- 96 % -- --   10/10/23 1740 -- -- -- 65 -- 97 % -- --   10/10/23 1735 -- -- -- 64 -- 98 % -- --   10/10/23 1730 -- -- -- 66 -- 96 % -- --   10/10/23 1725 -- -- -- 75 -- 97 % -- --   10/10/23 1720 -- -- -- 72 -- 97 % -- --   10/10/23 1715 -- -- -- 72 -- 96 % -- --   10/10/23 1710 133/68 " -- -- 71 -- 96 % -- --   10/10/23 1705 -- -- -- 75 -- 98 % -- --   10/10/23 1700 -- -- -- 67 -- 97 % -- --   10/10/23 1655 127/64 -- -- 66 -- 98 % -- --   10/10/23 1650 -- -- -- 64 -- 97 % -- --   10/10/23 1645 -- -- -- 67 -- 97 % -- --   10/10/23 1640 136/70 -- -- 78 -- 97 % -- --   10/10/23 1635 -- -- -- 74 -- 97 % -- --   10/10/23 1630 -- -- -- 75 -- 98 % -- --   10/10/23 1628 -- -- -- 69 -- 98 % -- --   10/10/23 1627 -- -- -- 76 -- 99 % -- --   10/10/23 1626 159/81 -- -- 69 13 98 % -- --   10/10/23 1626 -- -- -- 68 -- 97 % -- --   10/10/23 1625 -- -- -- 79 -- 98 % -- --   10/10/23 1624 159/81 -- -- 88 -- 97 % -- --   10/10/23 1620 -- -- -- 69 -- 98 % -- --   10/10/23 1615 -- -- -- 76 -- 99 % -- --   10/10/23 1610 -- -- -- 79 -- 98 % -- --   10/10/23 1338 (!) 146/102 -- -- -- -- -- -- --   10/10/23 1337 (!) 159/113 97.8 °F (36.6 °C) Temporal 76 14 96 % -- --       right upper  General: alert, appears stated age and cooperative   Wound: covered             Dressing: Clean, dry, intact   Extremity: Distal NVI           DVT Exam: No evidence of DVT                   Data Review:  Lab Results (last 24 hours)       Procedure Component Value Units Date/Time    Basic Metabolic Panel [[id number]]  (Abnormal) Collected: 10/11/23 0448    Specimen: Blood Updated: 10/11/23 0544     Glucose 135 mg/dL      BUN 16 mg/dL      Creatinine 0.81 mg/dL      Sodium 140 mmol/L      Potassium 4.2 mmol/L      Chloride 105 mmol/L      CO2 25.0 mmol/L      Calcium 8.4 mg/dL      BUN/Creatinine Ratio 19.8     Anion Gap 10.0 mmol/L      eGFR 73.9 mL/min/1.73     Narrative:      GFR Normal >60  Chronic Kidney Disease <60  Kidney Failure <15    The GFR formula is only valid for adults with stable renal function between ages 18 and 70.    Hemoglobin & Hematocrit, Blood [[id number]]  (Abnormal) Collected: 10/11/23 0448    Specimen: Blood Updated: 10/11/23 0527     Hemoglobin 10.0 g/dL      Hematocrit 33.5 %     POC Glucose Once [[id number]]   (Abnormal) Collected: 10/10/23 2007    Specimen: Blood Updated: 10/10/23 2019     Glucose 134 mg/dL      Comment: : [id number] Benny TinaMeter ID: [id number]       Comprehensive Metabolic Panel [[id number]]  (Abnormal) Collected: 10/10/23 1357    Specimen: Blood Updated: 10/10/23 1431     Glucose 144 mg/dL      BUN 18 mg/dL      Creatinine 0.92 mg/dL      Sodium 138 mmol/L      Potassium 4.1 mmol/L      Chloride 100 mmol/L      CO2 28.0 mmol/L      Calcium 9.5 mg/dL      Total Protein 6.4 g/dL      Albumin 3.8 g/dL      ALT (SGPT) 11 U/L      AST (SGOT) 19 U/L      Alkaline Phosphatase 143 U/L      Total Bilirubin 0.9 mg/dL      Globulin 2.6 gm/dL      A/G Ratio 1.5 g/dL      BUN/Creatinine Ratio 19.6     Anion Gap 10.0 mmol/L      eGFR 63.5 mL/min/1.73     Narrative:      GFR Normal >60  Chronic Kidney Disease <60  Kidney Failure <15    The GFR formula is only valid for adults with stable renal function between ages 18 and 70.    POC Glucose Once [[id number]]  (Abnormal) Collected: 10/10/23 1354    Specimen: Blood Updated: 10/10/23 1405     Glucose 142 mg/dL      Comment: : [id number] Robert MenendezMeter ID: [id number]             Imaging Results (Last 24 Hours)       Procedure Component Value Units Date/Time    XR Shoulder 2+ View Right [[id number]] Collected: 10/10/23 2031     Updated: 10/10/23 2034    Narrative:      EXAMINATION: XR SHOULDER 2+ VW RIGHT-  10/10/2023 8:31 PM CDT     HISTORY: Postop     FINDINGS: 2 view exam of the right shoulder reveal the patient is  undergone reverse right total shoulder arthroplasty. There is good  anatomic alignment with no evidence of complication.     This report was signed and finalized on 10/10/2023 8:31 PM CDT by Dr. Panda Kim MD.               Assessment:   1 Day Post-Op  Right Reverse TSA     Plan:      1:  DVT prophylaxis, ICE, elevate  2:  Pain control  3:  Physical therapy/Occupational therapy  4:  Anticipate discharge in 1-3 days, may require placement,  family requesting [facility] Bed.   5:  NWB DOUG Hutchison PA-C        Electronically signed by Bhupinder Hutchison PA-C at 10/11/23 0857       Consult Notes (last 24 hours)  Notes from 10/10/23 1348 through 10/11/23 134   No notes of this type exist for this encounter.       Physical Therapy Notes (last 24 hours)  Notes from 10/10/23 1348 through 10/11/23 1348   No notes exist for this encounter.          Occupational Therapy Notes (last 48 hours)        Anuradha Sanchez OTR/L at 10/11/23 0848          Patient Name: Dora Velazquez  : 1945    MRN: [id number]                              Today's Date: 10/11/2023       Admit Date: 10/10/2023    Visit Dx: No diagnosis found.  Patient Active Problem List   Diagnosis    Paroxysmal atrial fibrillation    (HFpEF) heart failure with preserved ejection fraction    Essential hypertension    Transient cerebral ischemia    Left bundle branch block    Hyperlipidemia    Encounter for colonoscopy due to history of colonic polyp    Recurrent deep vein thrombosis (DVT)    Anemia    Gastrointestinal hemorrhage    Chest pain, unspecified type    CKD (chronic kidney disease) stage 3, GFR 30-59 ml/min    Right rotator cuff tear arthropathy     Past Medical History:   Diagnosis Date    Arthritis     Backache     Bilateral pleural effusion     Blood in feces     Blood in feces symptom    Capsulitis     Cardiomyopathy      HFpEF, improved       Chest pain     CHF (congestive heart failure)     Chronic anemia     Chronic hepatitis C     Degenerative joint disease involving multiple joints     Diabetes mellitus     Dilated cardiomyopathy     Dyslipidemia     Dyspnea     Edema of leg     Epistaxis     Essential hypertension     Fibromyalgia, primary     GERD (gastroesophageal reflux disease)     H/O endoscopy 2014    Colon endoscopy 27940    Headache     Hemorrhoids     internal & external    History of liver recipient     S/P [facility] at [facility]         History of transfusion     Hyperlipemia     Hyperlipidemia     Hypertension     Hypokalemia     Left bundle branch block     Metatarsalgia     Metatarsalgia - with fat pad atrophie       Pap smear for cervical cancer screening 02/09/1996    Parkinson's disease     Paroxysmal atrial fibrillation     Polyp of sigmoid colon     Salmonella arthritis     Transient cerebral ischemia     Unspecified     Past Surgical History:   Procedure Laterality Date    BACK SURGERY  1995    Back Surgery (2)       CARDIAC CATHETERIZATION  11/03/2005    Orders Not Yet Performed: 0        CARPAL TUNNEL RELEASE  02/12/2007    Carpal tunnel surgery (1)      CAUTERIZATION NASAL BLEEDERS  03/09/2000    Control nose/throat bleeding (1)       CHOLECYSTECTOMY  2000    COLONOSCOPY N/A 8/14/2019    Procedure: COLONOSCOPY;  Surgeon: Chaparro Mckeon MD;  [facility] ENDOSCOPY;  Service: Gastroenterology    LAPAROSCOPIC LYSIS OF ADHESIONS  07/16/1992    Laparoscopy; lysis of adhesions (1)       LIVER BIOPSY  01/05/1998    Needle biopsy of liver 85326 (1)       LIVER TRANSPLANTATION  2003    Anesth, for liver transplant (1)       SALPINGO OOPHORECTOMY  1974    Salpingo-oophorectomy (1)       SHOULDER SURGERY  08/29/2007    Shoulder surgery procedure (1)       TOTAL ABDOMINAL HYSTERECTOMY  1968    Total abd hysterectomy (1)       TOTAL SHOULDER ARTHROPLASTY W/ DISTAL CLAVICLE EXCISION Right 10/10/2023    Procedure: RIGHT REVERSE TOTAL SHOULDER ARTHROPLASTY;  Surgeon: Frantz Rosa MD;  Location: [facility] OR;  Service: Orthopedics;  Laterality: Right;      General Information       Row Name 10/11/23 0736          OT Time and Intention    Document Type evaluation  Presented with progressive loss of function and increasing pain of the RUE due to a massive irreparable tear of the rotator cuff. Pt now s/p R Reverse Total Shoulder Arthroplasty on 10/10. PMH: liver transplant, heart failure, & kidney disease.  -LS     Mode of Treatment  occupational therapy  -       Row Name 10/11/23 0736          General Information    Patient Profile Reviewed yes  -     Prior Level of Function independent:;all household mobility;dependent:;cleaning;driving;shopping  Mod I with toileting; Mod A for bathing/dressing; Utilizes rwx for fxl ambulation within the home; Utilizes rollator if she goes out in community  -     Existing Precautions/Restrictions other (see comments);fall;non-weight bearing;right  NWB RUE in sling/immobilizer  -     Barriers to Rehab previous functional deficit  -       Row Name 10/11/23 07          Occupational Profile    Environmental Supports and Barriers (Occupational Profile) Tub shower combination with grab bar and shower chair. BSC over top of toilet. Sleeps in a lift chair. Other AD/DME: rollator, rwx, SC, 2 quad canes.  -       Row Name 10/11/23 0736          Living Environment    People in Home alone;other (see comments)  Sitter 8hrs/day Mon-Fri; home alone on weekends.  -       Row Name 10/11/23 07          Home Main Entrance    Number of Stairs, Main Entrance none  ramp  -       Row Name 10/11/23 07          Stairs Within Home, Primary    Number of Stairs, Within Home, Primary none  -       Row Name 10/11/23 07          Cognition    Orientation Status (Cognition) oriented x 4  -               User Key  (r) = Recorded By, (t) = Taken By, (c) = Cosigned By      Initials Name Provider Type     Anuradha Sanchez, OTR/L Occupational Therapist                     Mobility/ADL's       Row Name 10/11/23 0736          Bed Mobility    Bed Mobility supine-sit  -     Supine-Sit [place] (Bed Mobility) moderate assist (50% patient effort);verbal cues  -     Bed Mobility, Safety Issues decreased use of arms for pushing/pulling  -     Assistive Device (Bed Mobility) head of bed elevated  -       Row Name 10/11/23 0736          Transfers    Transfers sit-stand transfer;bed-chair transfer  -       Row Name  10/11/23 07          Bed-Chair Transfer    Bed-Chair [place] (Transfers) contact guard;minimum assist (75% patient effort);verbal cues  -     Assistive Device (Bed-Chair Transfers) other (see comments)  HHA x1  -       Row Name 10/11/23 07          Sit-Stand Transfer    Sit-Stand [place] (Transfers) contact guard;minimum assist (75% patient effort);verbal cues  -LS     Assistive Device (Sit-Stand Transfers) other (see comments)  -     Comment, (Sit-Stand Transfer) Min A on 1st attempt from bed; CGA on 2nd attempt from BSC  -       Row Name 10/11/23 07          Activities of Daily Living    BADL Assessment/Intervention upper body dressing;lower body dressing;toileting  -       Row Name 10/11/23 07          Upper Body Dressing Assessment/Training    [place] Level (Upper Body Dressing) upper body dressing skills;maximum assist (25% patient effort)  -LS     Position (Upper Body Dressing) edge of bed sitting  -       Row Name 10/11/23 07          Lower Body Dressing Assessment/Training    [place] Level (Lower Body Dressing) lower body dressing skills;maximum assist (25% patient effort)  -LS     Position (Lower Body Dressing) unsupported sitting;supported standing  -       Row Name 10/11/23 07          Toileting Assessment/Training    [place] Level (Toileting) toileting skills;maximum assist (25% patient effort)  -LS     Position (Toileting) supported sitting;supported standing  -               User Key  (r) = Recorded By, (t) = Taken By, (c) = Cosigned By      Initials Name Provider Type    Anuradha Anderson OTR/L Occupational Therapist                   Obj/Interventions       Row Name 10/11/23 07          Sensory Assessment (Somatosensory)    Sensory Assessment (Somatosensory) left UE;right UE  -LS     Left UE Sensory Assessment general sensation;intact  -LS     Right UE Sensory Assessment absent;other (see comments)  nerve block from sx  -       Row Name  10/11/23 0736          Vision Assessment/Intervention    Visual Impairment/Limitations WFL;corrective lenses full-time  -       Row Name 10/11/23 0736          Range of Motion Comprehensive    General Range of Motion upper extremity range of motion deficits identified  -     Comment, General Range of Motion RUE elbow and shoulder ROM testing deferred; RUE hand and wrist ROM WFL; LUE shoulder ROM impaired 25%; LUE ROM otherwise WFL  -       Row Name 10/11/23 0736          Strength Comprehensive (MMT)    General Manual Muscle Testing (MMT) Assessment upper extremity strength deficits identified  -     Comment, General Manual Muscle Testing (MMT) Assessment RUE elbow and shoulder strength testing deferred; RUE wrist 3/5; RUE  strength 3+/5; LUE shoulder 4-/5; LUE strength grossly 4+/5 at all other joints in all planes.  -       Row Name 10/11/23 0736          Upper Extremity (Manual Muscle Testing)    Comment, MMT: Upper Extremity R hand dominant  -       Row Name 10/11/23 07          Balance    Balance Assessment sitting static balance;sitting dynamic balance;standing static balance;standing dynamic balance  -     Static Sitting Balance independent  -     Dynamic Sitting Balance standby assist;contact guard  -LS     Position, Sitting Balance sitting edge of bed  -LS     Static Standing Balance contact guard  -LS     Dynamic Standing Balance contact guard;minimal assist  -LS     Position/Device Used, Standing Balance supported;other (see comments)  HHA x1  -               User Key  (r) = Recorded By, (t) = Taken By, (c) = Cosigned By      Initials Name Provider Type     Anuradha Sanchez, OTR/L Occupational Therapist                   Goals/Plan       Row Name 10/11/23 0736          Transfer Goal 1 (OT)    Activity/Assistive Device (Transfer Goal 1, OT) commode, bedside without drop arms  -     [place] Level/Cues Needed (Transfer Goal 1, OT) supervision required  -     Time Frame  (Transfer Goal 1, OT) long term goal (LTG);10 days  -LS     Progress/Outcome (Transfer Goal 1, OT) new goal  -LS       Row Name 10/11/23 07          Dressing Goal 1 (OT)    Activity/Device (Dressing Goal 1, OT) dressing skills, all  -LS     [place]/Cues Needed (Dressing Goal 1, OT) moderate assist (50-74% patient effort)  -LS     Time Frame (Dressing Goal 1, OT) long term goal (LTG);10 days  -LS     Progress/Outcome (Dressing Goal 1, OT) new goal  -LS       Row Name 10/11/23 07          Toileting Goal 1 (OT)    Activity/Device (Toileting Goal 1, OT) toileting skills, all  -LS     [place] Level/Cues Needed (Toileting Goal 1, OT) minimum assist (75% or more patient effort)  -LS     Time Frame (Toileting Goal 1, OT) long term goal (LTG);10 days  -LS     Progress/Outcome (Toileting Goal 1, OT) new goal  -       Row Name 10/11/23 07          Therapy Assessment/Plan (OT)    Planned Therapy Interventions (OT) activity tolerance training;functional balance retraining;occupation/activity based interventions;ROM/therapeutic exercise;adaptive equipment training;strengthening exercise;patient/caregiver education/training;orthotic fabrication/fitting/training;transfer/mobility retraining;passive ROM/stretching;BADL retraining  -               User Key  (r) = Recorded By, (t) = Taken By, (c) = Cosigned By      Initials Name Provider Type    LS Anuradha Sanchez OTR/L Occupational Therapist                   Clinical Impression       Row Name 10/11/23 07          Pain Assessment    Pretreatment Pain Rating 0/10 - no pain  -LS     Posttreatment Pain Rating 0/10 - no pain  -       Row Name 10/11/23 07          Plan of Care Review    Plan of Care Reviewed With patient  -LS     Progress no change  -LS     Outcome Evaluation OT eval completed. Pt in fowlers upon therapist arrival; A&Ox4; No pain reported, nerve block still intact. Pt reports requiring Mod A for bathing/dressing and being Mod I with toileting and  fxl ambulation utilizing rwx or rollator at [facility]. Today, Pt performed supine>sit with HOB elevated requiring Mod A and verbal cues for sequencing. Pt performed sit>stand from bed requiring Min A and verbal cues for body mechanics and sequencing; initially demo'd forward flexed posture but able to correct with cueing. Once standing Pt performed pivot transfer [place] [facility] [place] HHA x1 and Min A. Pt was able to perform sit>stand pivot transfer from BS>recliner with HHAx1 requiring CGA only. Pt performed anterior liliya hygiene while seated on BS with SBA. Pt currently requires Max A for UB and LB bathing/dressing. Pt is R hand dominant at baseline and does demonstrate some difficulty utilizing L hand for fxl tasks. Skilled OT indicated in order to address deficits in fxl mobility, fxl activity tolerance, balance, strength, and use of adaptive techniques/equipment during performance of BADLs. Recommend sub acute rehab at discharge.  -       Row Name 10/11/23 0736          Therapy Assessment/Plan (OT)    Rehab Potential (OT) good, to achieve stated therapy goals  -     Criteria for Skilled Therapeutic Interventions Met (OT) yes;skilled treatment is necessary  -     Therapy Frequency (OT) 5 times/wk  -       Row Name 10/11/23 0736          Therapy Plan Review/Discharge Plan (OT)    Anticipated Discharge Disposition (OT) sub acute care setting  -       Row Name 10/11/23 0736          Positioning and Restraints    Pre-Treatment Position in bed  -LS     Post Treatment Position chair  -LS     In Chair call light within reach;encouraged to call for assist;notified nsg;with brace;sitting  -               User Key  (r) = Recorded By, (t) = Taken By, (c) = Cosigned By      Initials Name Provider Type    Anuradha Anderson, OTR/L Occupational Therapist                   Outcome Measures       Row Name 10/11/23 0736          How much help from another is currently needed...    Putting on and taking off regular lower body  clothing? 2  -LS     Bathing (including washing, rinsing, and drying) 2  -LS     Toileting (which includes using toilet bed pan or urinal) 2  -LS     Putting on and taking off regular upper body clothing 2  -LS     Taking care of personal grooming (such as brushing teeth) 3  -LS     Eating meals 3  -LS     AM-PAC 6 Clicks Score (OT) 14  -LS       Row Name 10/10/23 2100          How much help from another person do you currently need...    Turning from your back to your side while in flat bed without using bedrails? 3  -LD     Moving from lying on back to sitting on the side of a flat bed without bedrails? 2  -LD     Moving to and from a bed to a chair (including a wheelchair)? 2  -LD     Standing up from a chair using your arms (e.g., wheelchair, bedside chair)? 2  -LD     Climbing 3-5 steps with a railing? 2  -LD     To walk in hospital room? 2  -LD     AM-PAC 6 Clicks Score (PT) 13  -LD     Highest level of mobility 4 --> Transferred to chair/commode  -LD       Row Name 10/11/23 0736          Functional Assessment    Outcome Measure Options AM-PAC 6 Clicks Daily Activity (OT)  -               User Key  (r) = Recorded By, (t) = Taken By, (c) = Cosigned By      Initials Name Provider Type    Anuradha Anderson, OTR/L Occupational Therapist    Kerry Gilbert, RN Registered Nurse                    Occupational Therapy Education       Title: PT OT SLP Therapies (In Progress)       Topic: Occupational Therapy (In Progress)       Point: ADL training (Done)       Description:   Instruct learner(s) on proper safety adaptation and remediation techniques during self care or transfers.   Instruct in proper use of assistive devices.                  Learning Progress Summary             Patient Acceptance, E, VU,NR by  at 10/11/2023 0817                         Point: Home exercise program (Not Started)       Description:   Instruct learner(s) on appropriate technique for monitoring, assisting and/or progressing  therapeutic exercises/activities.                  Learner Progress:  Not documented in this visit.              Point: Precautions (Done)       Description:   Instruct learner(s) on prescribed precautions during self-care and functional transfers.                  Learning Progress Summary             Patient Acceptance, E, VU,NR by  at 10/11/2023 0847                         Point: Body mechanics (Done)       Description:   Instruct learner(s) on proper positioning and spine alignment during self-care, functional mobility activities and/or exercises.                  Learning Progress Summary             Patient Acceptance, E, VU,NR by  at 10/11/2023 0847                                         User Key       Initials Effective Dates Name Provider Type Discipline    LYNDA 06/20/22 -  Anuradha Sanchez, OTR/L Occupational Therapist OT                  OT Recommendation and Plan  Planned Therapy Interventions (OT): activity tolerance training, functional balance retraining, occupation/activity based interventions, ROM/therapeutic exercise, adaptive equipment training, strengthening exercise, patient/caregiver education/training, orthotic fabrication/fitting/training, transfer/mobility retraining, passive ROM/stretching, BADL retraining  Therapy Frequency (OT): 5 times/wk  Plan of Care Review  Plan of Care Reviewed With: patient  Progress: no change  Outcome Evaluation: OT eval completed. Pt in fowlers upon therapist arrival; A&Ox4; No pain reported, nerve block still intact. Pt reports requiring Mod A for bathing/dressing and being Mod I with toileting and fxl ambulation utilizing rwx or rollator at [facility]. Today, Pt performed supine>sit with HOB elevated requiring Mod A and verbal cues for sequencing. Pt performed sit>stand from bed requiring Min A and verbal cues for body mechanics and sequencing; initially demo'd forward flexed posture but able to correct with cueing. Once standing Pt performed pivot transfer to [facility]  with HHA x1 and Min A. Pt was able to perform sit>stand pivot transfer from BSC>recliner with HHAx1 requiring CGA only. Pt performed anterior liliya hygiene while seated on [facility] [place]. Pt currently requires Max A for UB and LB bathing/dressing. Pt is R hand dominant at baseline and does demonstrate some difficulty utilizing L hand for fxl tasks. Skilled OT indicated in order to address deficits in fxl mobility, fxl activity tolerance, balance, strength, and use of adaptive techniques/equipment during performance of BADLs. Recommend sub acute rehab at discharge.     Time Calculation:         Time Calculation- OT       Row Name 10/11/23 0736             Time Calculation- OT    OT Start Time 0736  +10 minutes chart review  -      OT Stop Time 0831  -      OT Time Calculation (min) 55 min  -      OT Non-Billable Time (min) 65 min  -      OT Received On 10/11/23  -      OT Goal Re-Cert Due Date 10/21/23  -                User Key  (r) = Recorded By, (t) = Taken By, (c) = Cosigned By      Initials Name Provider Type     Anuradha Sanchez OTR/L Occupational Therapist                  Therapy Charges for Today       Code Description Service Date Service Provider Modifiers Qty    [id number] HC OT EVAL MOD COMPLEXITY 4 10/11/2023 Anuradha Sanchez OTR/L GO 1                 SAIMA Quiroga/L  10/11/2023    Electronically signed by Anuradha Sanchez OTR/L at 10/11/23 0848       Anuradha Sanchez OTR/L at 10/11/23 0848          Goal Outcome Evaluation:  Plan of Care Reviewed With: patient        Progress: no change  Outcome Evaluation: OT eval completed. Pt in fowlers upon therapist arrival; A&Ox4; No pain reported, nerve block still intact. Pt reports requiring Mod A for bathing/dressing and being Mod I with toileting and fxl ambulation utilizing rwx or rollator at [facility]. Today, Pt performed supine>sit with HOB elevated requiring Mod A and verbal cues for sequencing. Pt performed sit>stand from bed requiring Min A and  verbal cues for body mechanics and sequencing; initially demo'd forward flexed posture but able to correct with cueing. Once standing Pt performed pivot transfer to BSC with HHA x1 and Min A. Pt was able to perform sit>stand pivot transfer from BSC>recliner with HHAx1 requiring CGA only. Pt performed anterior liliya hygiene while seated on BSC with SBA. Pt currently requires Max A for UB and LB bathing/dressing. Pt is R hand dominant at baseline and does demonstrate some difficulty utilizing L hand for fxl tasks. Skilled OT indicated in order to address deficits in fxl mobility, fxl activity tolerance, balance, strength, and use of adaptive techniques/equipment during performance of BADLs. Recommend sub acute rehab at discharge.      Anticipated Discharge Disposition (OT): sub acute care setting    Electronically signed by Anuradha Sanchez OTR/L at 10/11/23 2323

## 2023-10-11 NOTE — THERAPY EVALUATION
Patient Name: Dora Velazquez  : 1945    MRN: [id number]                              Today's Date: 10/11/2023       Admit Date: 10/10/2023    Visit Dx: No diagnosis found.  Patient Active Problem List   Diagnosis    Paroxysmal atrial fibrillation    (HFpEF) heart failure with preserved ejection fraction    Essential hypertension    Transient cerebral ischemia    Left bundle branch block    Hyperlipidemia    Encounter for colonoscopy due to history of colonic polyp    Recurrent deep vein thrombosis (DVT)    Anemia    Gastrointestinal hemorrhage    Chest pain, unspecified type    CKD (chronic kidney disease) stage 3, GFR 30-59 ml/min    Right rotator cuff tear arthropathy     Past Medical History:   Diagnosis Date    Arthritis     Backache     Bilateral pleural effusion     Blood in feces     Blood in feces symptom    Capsulitis     Cardiomyopathy      HFpEF, improved       Chest pain     CHF (congestive heart failure)     Chronic anemia     Chronic hepatitis C     Degenerative joint disease involving multiple joints     Diabetes mellitus     Dilated cardiomyopathy     Dyslipidemia     Dyspnea     Edema of leg     Epistaxis     Essential hypertension     Fibromyalgia, primary     GERD (gastroesophageal reflux disease)     H/O endoscopy 2014    Colon endoscopy 24679    Headache     Hemorrhoids     internal & external    History of liver recipient     S/P done at [facility]        History of transfusion     Hyperlipemia     Hyperlipidemia     Hypertension     Hypokalemia     Left bundle branch block     Metatarsalgia     Metatarsalgia - with fat pad atrophie       Pap smear for cervical cancer screening 1996    Parkinson's disease     Paroxysmal atrial fibrillation     Polyp of sigmoid colon     Salmonella arthritis     Transient cerebral ischemia     Unspecified     Past Surgical History:   Procedure Laterality Date    BACK SURGERY      Back Surgery (2)       CARDIAC CATHETERIZATION   11/03/2005    Orders Not Yet Performed: 0        CARPAL TUNNEL RELEASE  02/12/2007    Carpal tunnel surgery (1)      CAUTERIZATION NASAL BLEEDERS  03/09/2000    Control nose/throat bleeding (1)       CHOLECYSTECTOMY  2000    COLONOSCOPY N/A 8/14/2019    Procedure: COLONOSCOPY;  Surgeon: Chaparro Mckeon MD;  Location: [facility] ENDOSCOPY;  Service: Gastroenterology    LAPAROSCOPIC LYSIS OF ADHESIONS  07/16/1992    Laparoscopy; lysis of adhesions (1)       LIVER BIOPSY  01/05/1998    Needle biopsy of liver 72829 (1)       LIVER TRANSPLANTATION  2003    Anesth, for liver transplant (1)       SALPINGO OOPHORECTOMY  1974    Salpingo-oophorectomy (1)       SHOULDER SURGERY  08/29/2007    Shoulder surgery procedure (1)       TOTAL ABDOMINAL HYSTERECTOMY  1968    Total abd hysterectomy (1)       TOTAL SHOULDER ARTHROPLASTY W/ DISTAL CLAVICLE EXCISION Right 10/10/2023    Procedure: RIGHT REVERSE TOTAL SHOULDER ARTHROPLASTY;  Surgeon: Frantz Rosa MD;  Location: [facility] OR;  Service: Orthopedics;  Laterality: Right;      General Information       Row Name 10/11/23 0736          OT Time and Intention    Document Type evaluation  Presented with progressive loss of function and increasing pain of the RUE due to a massive irreparable tear of the rotator cuff. Pt now s/p R Reverse Total Shoulder Arthroplasty on 10/10. PMH: liver transplant, heart failure, & kidney disease.  -LS     Mode of Treatment occupational therapy  -       Row Name 10/11/23 0736          General Information    Patient Profile Reviewed yes  -LS     Prior Level of Function independent:;all household mobility;dependent:;cleaning;driving;shopping  Mod I with toileting; Mod A for bathing/dressing; Utilizes rwx for fxl ambulation within the home; Utilizes rollator if she goes out in community  -LS     Existing Precautions/Restrictions other (see comments);fall;non-weight bearing;right  NWB RUE in sling/immobilizer  -LS     Barriers to Rehab previous  functional deficit  -       Row Name 10/11/23 0736          Occupational Profile    Environmental Supports and Barriers (Occupational Profile) Tub shower combination with grab bar and shower chair. BSC over top of toilet. Sleeps in a lift chair. Other AD/DME: rollator, rwx, SC, 2 quad canes.  -       Row Name 10/11/23 0736          Living Environment    People in Home alone;other (see comments)  Sitter 8hrs/day Mon-Fri; home alone on weekends.  -LS       Row Name 10/11/23 07          Home Main Entrance    Number of Stairs, Main Entrance none  ramp  -LS       Row Name 10/11/23 07          Stairs Within Home, Primary    Number of Stairs, Within Home, Primary none  -       Row Name 10/11/23 07          Cognition    Orientation Status (Cognition) oriented x 4  -               User Key  (r) = Recorded By, (t) = Taken By, (c) = Cosigned By      Initials Name Provider Type    Anuradha Anderson, OTR/L Occupational Therapist                     Mobility/ADL's       Row Name 10/11/23 0736          Bed Mobility    Bed Mobility supine-sit  -LS     Supine-Sit [place] (Bed Mobility) moderate assist (50% patient effort);verbal cues  -LS     Bed Mobility, Safety Issues decreased use of arms for pushing/pulling  -     Assistive Device (Bed Mobility) head of bed elevated  -       Row Name 10/11/23 0736          Transfers    Transfers sit-stand transfer;bed-chair transfer  -       Row Name 10/11/23 07          Bed-Chair Transfer    Bed-Chair [place] (Transfers) contact guard;minimum assist (75% patient effort);verbal cues  -LS     Assistive Device (Bed-Chair Transfers) other (see comments)  HHA x1  -       Row Name 10/11/23 07          Sit-Stand Transfer    Sit-Stand [place] (Transfers) contact guard;minimum assist (75% patient effort);verbal cues  -LS     Assistive Device (Sit-Stand Transfers) other (see comments)  -LS     Comment, (Sit-Stand Transfer) Min A on 1st attempt from bed; CGA on 2nd  attempt from BSC  -       Row Name 10/11/23 [facility]          Activities of Daily Living    BADL Assessment/Intervention upper body dressing;lower body dressing;toileting  -       Row Name 10/11/23 07          Upper Body Dressing Assessment/Training    [place] Level (Upper Body Dressing) upper body dressing skills;maximum assist (25% patient effort)  -     Position (Upper Body Dressing) edge of bed sitting  -       Row Name 10/11/23 07          Lower Body Dressing Assessment/Training    [place] Level (Lower Body Dressing) lower body dressing skills;maximum assist (25% patient effort)  -LS     Position (Lower Body Dressing) unsupported sitting;supported standing  -       Row Name 10/11/23 07          Toileting Assessment/Training    [place] Level (Toileting) toileting skills;maximum assist (25% patient effort)  -LS     Position (Toileting) supported sitting;supported standing  -               User Key  (r) = Recorded By, (t) = Taken By, (c) = Cosigned By      Initials Name Provider Type     Anuradha Sanchez, OTR/L Occupational Therapist                   Obj/Interventions       [facility] Name 10/11/23 07          Sensory Assessment (Somatosensory)    Sensory Assessment (Somatosensory) left UE;right UE  -     Left UE Sensory Assessment general sensation;intact  -LS     Right UE Sensory Assessment absent;other (see comments)  nerve block from sx  -       Row Name 10/11/23 07          Vision Assessment/Intervention    Visual Impairment/Limitations WFL;corrective lenses full-time  -       Row Name 10/11/23 07          Range of Motion Comprehensive    General Range of Motion upper extremity range of motion deficits identified  -     Comment, General Range of Motion RUE elbow and shoulder ROM testing deferred; RUE hand and wrist ROM WFL; LUE shoulder ROM impaired 25%; LUE ROM otherwise WFL  -       Row Name 10/11/23 07          Strength Comprehensive (MMT)    General Manual Muscle  Testing (MMT) Assessment upper extremity strength deficits identified  -     Comment, General Manual Muscle Testing (MMT) Assessment RUE elbow and shoulder strength testing deferred; RUE wrist 3/5; RUE  strength 3+/5; LUE shoulder 4-/5; LUE strength grossly 4+/5 at all other joints in all planes.  -       Row Name 10/11/23 0736          Upper Extremity (Manual Muscle Testing)    Comment, MMT: Upper Extremity R hand dominant  -       Row Name 10/11/23 0736          Balance    Balance Assessment sitting static balance;sitting dynamic balance;standing static balance;standing dynamic balance  -LS     Static Sitting Balance independent  -LS     Dynamic Sitting Balance standby assist;contact guard  -LS     Position, Sitting Balance sitting edge of bed  -LS     Static Standing Balance contact guard  -LS     Dynamic Standing Balance contact guard;minimal assist  -LS     Position/Device Used, Standing Balance supported;other (see comments)  HHA x1  -LS               User Key  (r) = Recorded By, (t) = Taken By, (c) = Cosigned By      Initials Name Provider Type     Anuradha Sanchez, OTR/L Occupational Therapist                   Goals/Plan       Row Name 10/11/23 0736          Transfer Goal 1 (OT)    Activity/Assistive Device (Transfer Goal 1, OT) commode, bedside without drop arms  -LS     [place] Level/Cues Needed (Transfer Goal 1, OT) supervision required  -LS     Time Frame (Transfer Goal 1, OT) long term goal (LTG);10 days  -LS     Progress/Outcome (Transfer Goal 1, OT) new goal  -       Row Name 10/11/23 0736          Dressing Goal 1 (OT)    Activity/Device (Dressing Goal 1, OT) dressing skills, all  -LS     [place]/Cues Needed (Dressing Goal 1, OT) moderate assist (50-74% patient effort)  -LS     Time Frame (Dressing Goal 1, OT) long term goal (LTG);10 days  -LS     Progress/Outcome (Dressing Goal 1, OT) new goal  -       Row Name 10/11/23 0736          Toileting Goal 1 (OT)    Activity/Device  (Toileting Goal 1, OT) toileting skills, all  -LS     [place] Level/Cues Needed (Toileting Goal 1, OT) minimum assist (75% or more patient effort)  -LS     Time Frame (Toileting Goal 1, OT) long term goal (LTG);10 days  -LS     Progress/Outcome (Toileting Goal 1, OT) new goal  -LS       Row Name 10/11/23 0736          Therapy Assessment/Plan (OT)    Planned Therapy Interventions (OT) activity tolerance training;functional balance retraining;occupation/activity based interventions;ROM/therapeutic exercise;adaptive equipment training;strengthening exercise;patient/caregiver education/training;orthotic fabrication/fitting/training;transfer/mobility retraining;passive ROM/stretching;BADL retraining  -LS               User Key  (r) = Recorded By, (t) = Taken By, (c) = Cosigned By      Initials Name Provider Type    LS Anuradha Sanchez OTR/L Occupational Therapist                   Clinical Impression       Row Name 10/11/23 36          Pain Assessment    Pretreatment Pain Rating 0/10 - no pain  -LS     Posttreatment Pain Rating 0/10 - no pain  -LS       Row Name 10/11/23 36          Plan of Care Review    Plan of Care Reviewed With patient  -LS     Progress no change  -LS     Outcome Evaluation OT eval completed. Pt in fowlers upon therapist arrival; A&Ox4; No pain reported, nerve block still intact. Pt reports requiring Mod A for bathing/dressing and being Mod I with toileting and fxl ambulation utilizing rwx or rollator at [facility]. Today, Pt performed supine>sit with HOB elevated requiring Mod A and verbal cues for sequencing. Pt performed sit>stand from bed requiring Min A and verbal cues for body mechanics and sequencing; initially demo'd forward flexed posture but able to correct with cueing. Once standing Pt performed pivot transfer to [facility] with HHA x1 and Min A. Pt was able to perform sit>stand pivot transfer from BS>recliner with HHAx1 requiring CGA only. Pt performed anterior liliya hygiene while seated on [facility]  with SBA. Pt currently requires Max A for UB and LB bathing/dressing. Pt is R hand dominant at baseline and does demonstrate some difficulty utilizing L hand for fxl tasks. Skilled OT indicated in order to address deficits in fxl mobility, fxl activity tolerance, balance, strength, and use of adaptive techniques/equipment during performance of BADLs. Recommend sub acute rehab at discharge.  -       Row Name 10/11/23 0736          Therapy Assessment/Plan (OT)    Rehab Potential (OT) good, to achieve stated therapy goals  -     Criteria for Skilled Therapeutic Interventions Met (OT) yes;skilled treatment is necessary  -     Therapy Frequency (OT) 5 times/wk  -       Row Name 10/11/23 0736          Therapy Plan Review/Discharge Plan (OT)    Anticipated Discharge Disposition (OT) sub acute care setting  -       Row Name 10/11/23 0736          Positioning and Restraints    Pre-Treatment Position in bed  -LS     Post Treatment Position chair  -LS     In Chair call light within reach;encouraged to call for assist;notified nsg;with brace;sitting  -               User Key  (r) = Recorded By, (t) = Taken By, (c) = Cosigned By      Initials Name Provider Type    LS Anuradha Sanchez, OTR/L Occupational Therapist                   Outcome Measures       Row Name 10/11/23 0736          How much help from another is currently needed...    Putting on and taking off regular lower body clothing? 2  -LS     Bathing (including washing, rinsing, and drying) 2  -LS     Toileting (which includes using toilet bed pan or urinal) 2  -LS     Putting on and taking off regular upper body clothing 2  -LS     Taking care of personal grooming (such as brushing teeth) 3  -LS     Eating meals 3  -LS     AM-PAC 6 Clicks Score (OT) 14  -       Row Name 10/10/23 2100          How much help from another person do you currently need...    Turning from your back to your side while in flat bed without using bedrails? 3  -LD     Moving from lying  on back to sitting on the side of a flat bed without bedrails? 2  -LD     Moving to and from a bed to a chair (including a wheelchair)? 2  -LD     Standing up from a chair using your arms (e.g., wheelchair, bedside chair)? 2  -LD     Climbing 3-5 steps with a railing? 2  -LD     To walk in hospital room? 2  -LD     AM-PAC 6 Clicks Score (PT) 13  -LD     Highest level of mobility 4 --> Transferred to chair/commode  -LD       Row Name 10/11/23 0736          Functional Assessment    Outcome Measure Options AM-PAC 6 Clicks Daily Activity (OT)  -               User Key  (r) = Recorded By, (t) = Taken By, (c) = Cosigned By      Initials Name Provider Type    LS Anuradha Sanchez OTR/L Occupational Therapist    Kerry Gilbert RN Registered Nurse                    Occupational Therapy Education       Title: PT OT SLP Therapies (In Progress)       Topic: Occupational Therapy (In Progress)       Point: ADL training (Done)       Description:   Instruct learner(s) on proper safety adaptation and remediation techniques during self care or transfers.   Instruct in proper use of assistive devices.                  Learning Progress Summary             Patient Acceptance, E, VU,NR by  at 10/11/2023 0847                         Point: Home exercise program (Not Started)       Description:   Instruct learner(s) on appropriate technique for monitoring, assisting and/or progressing therapeutic exercises/activities.                  Learner Progress:  Not documented in this visit.              Point: Precautions (Done)       Description:   Instruct learner(s) on prescribed precautions during self-care and functional transfers.                  Learning Progress Summary             Patient Acceptance, E, VU,NR by LS at 10/11/2023 0847                         Point: Body mechanics (Done)       Description:   Instruct learner(s) on proper positioning and spine alignment during self-care, functional mobility activities and/or  exercises.                  Learning Progress Summary             Patient Acceptance, E, VU,NR by LYNDA at 10/11/2023 0847                                         User Key       Initials Effective Dates Name Provider Type Discipline    LYNDA 06/20/22 -  Anuradha Sanchez OTR/L Occupational Therapist OT                  OT Recommendation and Plan  Planned Therapy Interventions (OT): activity tolerance training, functional balance retraining, occupation/activity based interventions, ROM/therapeutic exercise, adaptive equipment training, strengthening exercise, patient/caregiver education/training, orthotic fabrication/fitting/training, transfer/mobility retraining, passive ROM/stretching, BADL retraining  Therapy Frequency (OT): 5 times/wk  Plan of Care Review  Plan of Care Reviewed With: patient  Progress: no change  Outcome Evaluation: OT eval completed. Pt in fowlers upon therapist arrival; A&Ox4; No pain reported, nerve block still intact. Pt reports requiring Mod A for bathing/dressing and being Mod I with toileting and fxl ambulation utilizing rwx or rollator at [facility]. Today, Pt performed supine>sit with HOB elevated requiring Mod A and verbal cues for sequencing. Pt performed sit>stand from bed requiring Min A and verbal cues for body mechanics and sequencing; initially demo'd forward flexed posture but able to correct with cueing. Once standing Pt performed pivot transfer to BSC with HHA x1 and Min A. Pt was able to perform sit>stand pivot transfer from BSC>recliner with HHAx1 requiring CGA only. Pt performed anterior liliya hygiene while seated on BSC with SBA. Pt currently requires Max A for UB and LB bathing/dressing. Pt is R hand dominant at baseline and does demonstrate some difficulty utilizing L hand for fxl tasks. Skilled OT indicated in order to address deficits in fxl mobility, fxl activity tolerance, balance, strength, and use of adaptive techniques/equipment during performance of BADLs. Recommend sub acute  rehab at discharge.     Time Calculation:         Time Calculation- OT       Row Name 10/11/23 0736             Time Calculation- OT    OT Start Time 0736  +10 minutes chart review  -LS      OT Stop Time 0831  -      OT Time Calculation (min) 55 min  -      OT Non-Billable Time (min) 65 min  -      OT Received On 10/11/23  -      OT Goal Re-Cert Due Date 10/21/23  -                User Key  (r) = Recorded By, (t) = Taken By, (c) = Cosigned By      Initials Name Provider Type     Anuradha Sanchez OTR/L Occupational Therapist                  Therapy Charges for Today       Code Description Service Date Service Provider Modifiers Qty    [id number] HC OT EVAL MOD COMPLEXITY 4 10/11/2023 Anuradha Sanchez OTR/L GO 1                 Anuradha Sanchez OTR/REGINALD  10/11/2023

## 2023-10-12 LAB
ANION GAP SERPL CALCULATED.3IONS-SCNC: 8 MMOL/L (ref 5–15)
BUN SERPL-MCNC: 21 MG/DL (ref 8–23)
BUN/CREAT SERPL: 18.6 (ref 7–25)
CALCIUM SPEC-SCNC: 8.2 MG/DL (ref 8.6–10.5)
CHLORIDE SERPL-SCNC: 103 MMOL/L (ref 98–107)
CO2 SERPL-SCNC: 25 MMOL/L (ref 22–29)
CREAT SERPL-MCNC: 1.13 MG/DL (ref 0.57–1)
DEPRECATED RDW RBC AUTO: 50 FL (ref 37–54)
EGFRCR SERPLBLD CKD-EPI 2021: 49.6 ML/MIN/1.73
ERYTHROCYTE [DISTWIDTH] IN BLOOD BY AUTOMATED COUNT: 14.4 % (ref 12.3–15.4)
GLUCOSE SERPL-MCNC: 146 MG/DL (ref 65–99)
HCT VFR BLD AUTO: 27.8 % (ref 34–46.6)
HGB BLD-MCNC: 8.5 G/DL (ref 12–15.9)
MCH RBC QN AUTO: 29.4 PG (ref 26.6–33)
MCHC RBC AUTO-ENTMCNC: 30.6 G/DL (ref 31.5–35.7)
MCV RBC AUTO: 96.2 FL (ref 79–97)
PLATELET # BLD AUTO: 141 10*3/MM3 (ref 140–450)
PMV BLD AUTO: 10.5 FL (ref 6–12)
POTASSIUM SERPL-SCNC: 3.9 MMOL/L (ref 3.5–5.2)
RBC # BLD AUTO: 2.89 10*6/MM3 (ref 3.77–5.28)
SODIUM SERPL-SCNC: 136 MMOL/L (ref 136–145)
WBC NRBC COR # BLD: 12.69 10*3/MM3 (ref 3.4–10.8)

## 2023-10-12 PROCEDURE — A9270 NON-COVERED ITEM OR SERVICE: HCPCS | Performed by: ORTHOPAEDIC SURGERY

## 2023-10-12 PROCEDURE — 63710000001 TRAMADOL 50 MG TABLET: Performed by: INTERNAL MEDICINE

## 2023-10-12 PROCEDURE — 97110 THERAPEUTIC EXERCISES: CPT

## 2023-10-12 PROCEDURE — A9270 NON-COVERED ITEM OR SERVICE: HCPCS | Performed by: PHYSICIAN ASSISTANT

## 2023-10-12 PROCEDURE — 63710000001 SPIRONOLACTONE 25 MG TABLET: Performed by: ORTHOPAEDIC SURGERY

## 2023-10-12 PROCEDURE — 63710000001 SIROLIMUS 0.5 MG TABLET: Performed by: ORTHOPAEDIC SURGERY

## 2023-10-12 PROCEDURE — 97116 GAIT TRAINING THERAPY: CPT

## 2023-10-12 PROCEDURE — 80048 BASIC METABOLIC PNL TOTAL CA: CPT | Performed by: ORTHOPAEDIC SURGERY

## 2023-10-12 PROCEDURE — 63710000001 ACETAMINOPHEN 325 MG TABLET: Performed by: ORTHOPAEDIC SURGERY

## 2023-10-12 PROCEDURE — 63710000001 CARBIDOPA-LEVODOPA 25-100 MG TABLET: Performed by: ORTHOPAEDIC SURGERY

## 2023-10-12 PROCEDURE — 63710000001 FAMOTIDINE 20 MG TABLET: Performed by: ORTHOPAEDIC SURGERY

## 2023-10-12 PROCEDURE — 63710000001 ATORVASTATIN 10 MG TABLET: Performed by: ORTHOPAEDIC SURGERY

## 2023-10-12 PROCEDURE — 97530 THERAPEUTIC ACTIVITIES: CPT

## 2023-10-12 PROCEDURE — 63710000001 GABAPENTIN 100 MG CAPSULE: Performed by: ORTHOPAEDIC SURGERY

## 2023-10-12 PROCEDURE — 63710000001 ISOSORBIDE MONONITRATE 60 MG TABLET SUSTAINED-RELEASE 24 HOUR: Performed by: ORTHOPAEDIC SURGERY

## 2023-10-12 PROCEDURE — 63710000001 METOPROLOL SUCCINATE XL 25 MG TABLET SUSTAINED-RELEASE 24 HOUR: Performed by: INTERNAL MEDICINE

## 2023-10-12 PROCEDURE — 63710000001 LINAGLIPTIN 5 MG TABLET: Performed by: ORTHOPAEDIC SURGERY

## 2023-10-12 PROCEDURE — 85027 COMPLETE CBC AUTOMATED: CPT | Performed by: INTERNAL MEDICINE

## 2023-10-12 PROCEDURE — 63710000001 FUROSEMIDE 40 MG TABLET: Performed by: ORTHOPAEDIC SURGERY

## 2023-10-12 PROCEDURE — 63710000001 RIVAROXABAN 15 MG TABLET: Performed by: PHYSICIAN ASSISTANT

## 2023-10-12 PROCEDURE — A9270 NON-COVERED ITEM OR SERVICE: HCPCS | Performed by: INTERNAL MEDICINE

## 2023-10-12 PROCEDURE — 25810000003 LACTATED RINGERS PER 1000 ML: Performed by: ORTHOPAEDIC SURGERY

## 2023-10-12 PROCEDURE — 97535 SELF CARE MNGMENT TRAINING: CPT

## 2023-10-12 RX ORDER — FAMOTIDINE 20 MG/1
20 TABLET, FILM COATED ORAL DAILY
Status: DISCONTINUED | OUTPATIENT
Start: 2023-10-13 | End: 2023-10-13 | Stop reason: HOSPADM

## 2023-10-12 RX ADMIN — FUROSEMIDE 40 MG: 40 TABLET ORAL at 09:07

## 2023-10-12 RX ADMIN — Medication 10 ML: at 09:11

## 2023-10-12 RX ADMIN — CARBIDOPA AND LEVODOPA 2 TABLET: 25; 100 TABLET ORAL at 09:07

## 2023-10-12 RX ADMIN — Medication 10 ML: at 20:53

## 2023-10-12 RX ADMIN — SPIRONOLACTONE 25 MG: 25 TABLET ORAL at 09:07

## 2023-10-12 RX ADMIN — FAMOTIDINE 40 MG: 20 TABLET, FILM COATED ORAL at 09:07

## 2023-10-12 RX ADMIN — CARBIDOPA AND LEVODOPA 2 TABLET: 25; 100 TABLET ORAL at 20:45

## 2023-10-12 RX ADMIN — SIROLIMUS 0.5 MG: 0.5 TABLET, FILM COATED ORAL at 09:11

## 2023-10-12 RX ADMIN — ATORVASTATIN CALCIUM 20 MG: 10 TABLET, FILM COATED ORAL at 20:45

## 2023-10-12 RX ADMIN — ACETAMINOPHEN 650 MG: 325 TABLET, FILM COATED ORAL at 17:22

## 2023-10-12 RX ADMIN — CARBIDOPA AND LEVODOPA 2 TABLET: 25; 100 TABLET ORAL at 16:20

## 2023-10-12 RX ADMIN — ISOSORBIDE MONONITRATE 120 MG: 60 TABLET, EXTENDED RELEASE ORAL at 09:07

## 2023-10-12 RX ADMIN — TRAMADOL HYDROCHLORIDE 50 MG: 50 TABLET, COATED ORAL at 18:41

## 2023-10-12 RX ADMIN — LINAGLIPTIN 5 MG: 5 TABLET, FILM COATED ORAL at 09:07

## 2023-10-12 RX ADMIN — METOPROLOL SUCCINATE 25 MG: 25 TABLET, EXTENDED RELEASE ORAL at 09:07

## 2023-10-12 RX ADMIN — GABAPENTIN 100 MG: 100 CAPSULE ORAL at 20:45

## 2023-10-12 RX ADMIN — SODIUM CHLORIDE, POTASSIUM CHLORIDE, SODIUM LACTATE AND CALCIUM CHLORIDE 100 ML/HR: 600; 310; 30; 20 INJECTION, SOLUTION INTRAVENOUS at 16:20

## 2023-10-12 RX ADMIN — RIVAROXABAN 15 MG: 15 TABLET, FILM COATED ORAL at 17:22

## 2023-10-12 RX ADMIN — SODIUM CHLORIDE, POTASSIUM CHLORIDE, SODIUM LACTATE AND CALCIUM CHLORIDE 100 ML/HR: 600; 310; 30; 20 INJECTION, SOLUTION INTRAVENOUS at 04:17

## 2023-10-12 NOTE — CASE MANAGEMENT/SOCIAL WORK
Continued Stay Note  [facility]     Patient Name: Dora Velazquez  MRN: [id number]  Today's Date: 10/12/2023    Admit Date: 10/10/2023    Plan: [facility] - Pending Precert   Discharge Plan       Row Name 10/12/23 0957       Plan    Plan [facility] - Pending Precert    Plan Comments Spoke to Jasbir in admissions at [facility] and she confirmed patient is accepted pending insurance approval. Jasbir is starting precert with [facility] now. Will follow for approval.             RONALD Segovia

## 2023-10-12 NOTE — THERAPY TREATMENT NOTE
Acute Care - Physical Therapy Treatment Note  [facility]     Patient Name: Dora Velazquez  : 1945  MRN: [id number]  Today's Date: 10/12/2023      Visit Dx:     ICD-10-CM ICD-9-CM   1. Impaired mobility [Z74.09]  Z74.09 799.89   2. Decreased activities of daily living (ADL) [Z78.9]  Z78.9 V49.89     Patient Active Problem List   Diagnosis    Paroxysmal atrial fibrillation    (HFpEF) heart failure with preserved ejection fraction    Essential hypertension    Transient cerebral ischemia    Left bundle branch block    Hyperlipidemia    Encounter for colonoscopy due to history of colonic polyp    Recurrent deep vein thrombosis (DVT)    Anemia    Gastrointestinal hemorrhage    Chest pain, unspecified type    CKD (chronic kidney disease) stage 3, GFR 30-59 ml/min    Right rotator cuff tear arthropathy     Past Medical History:   Diagnosis Date    Arthritis     Backache     Bilateral pleural effusion     Blood in feces     Blood in feces symptom    Capsulitis     Cardiomyopathy      HFpEF, improved       Chest pain     CHF (congestive heart failure)     Chronic anemia     Chronic hepatitis C     Degenerative joint disease involving multiple joints     Diabetes mellitus     Dilated cardiomyopathy     Dyslipidemia     Dyspnea     Edema of leg     Epistaxis     Essential hypertension     Fibromyalgia, primary     GERD (gastroesophageal reflux disease)     H/O endoscopy 2014    Colon endoscopy 07848    Headache     Hemorrhoids     internal & external    History of liver recipient     S/P done at [facility]        History of transfusion     Hyperlipemia     Hyperlipidemia     Hypertension     Hypokalemia     Left bundle branch block     Metatarsalgia     Metatarsalgia - with fat pad atrophie       Pap smear for cervical cancer screening 1996    Parkinson's disease     Paroxysmal atrial fibrillation     Polyp of sigmoid colon     Salmonella arthritis     Transient cerebral ischemia     Unspecified      Past Surgical History:   Procedure Laterality Date    BACK SURGERY  1995    Back Surgery (2)       CARDIAC CATHETERIZATION  11/03/2005    Orders Not Yet Performed: 0        CARPAL TUNNEL RELEASE  02/12/2007    Carpal tunnel surgery (1)      CAUTERIZATION NASAL BLEEDERS  03/09/2000    Control nose/throat bleeding (1)       CHOLECYSTECTOMY  2000    COLONOSCOPY N/A 8/14/2019    Procedure: COLONOSCOPY;  Surgeon: Chaparro Mckeon MD;  Location: [facility] ENDOSCOPY;  Service: Gastroenterology    LAPAROSCOPIC LYSIS OF ADHESIONS  07/16/1992    Laparoscopy; lysis of adhesions (1)       LIVER BIOPSY  01/05/1998    Needle biopsy of liver 88099 (1)       LIVER TRANSPLANTATION  2003    Anesth, for liver transplant (1)       SALPINGO OOPHORECTOMY  1974    Salpingo-oophorectomy (1)       SHOULDER SURGERY  08/29/2007    Shoulder surgery procedure (1)       TOTAL ABDOMINAL HYSTERECTOMY  1968    Total abd hysterectomy (1)       TOTAL SHOULDER ARTHROPLASTY W/ DISTAL CLAVICLE EXCISION Right 10/10/2023    Procedure: RIGHT REVERSE TOTAL SHOULDER ARTHROPLASTY;  Surgeon: Frantz Rosa MD;  Location: [facility] OR;  Service: Orthopedics;  Laterality: Right;     PT Assessment (last 12 hours)       PT Evaluation and Treatment       Row Name 10/12/23 1414 10/12/23 0924       Physical Therapy Time and Intention    Subjective Information complains of;weakness;fatigue  -LY no complaints  -LY    Document Type therapy note (daily note)  -LY therapy note (daily note)  -LY    Mode of Treatment physical therapy  -LY physical therapy  -LY      Row Name 10/12/23 1414 10/12/23 0924       General Information    Existing Precautions/Restrictions fall  -LY fall;non-weight bearing;right;shoulder  -LY      Row Name 10/12/23 1414 10/12/23 0924       Pain    Pretreatment Pain Rating 0/10 - no pain  -LY 0/10 - no pain  -LY    Posttreatment Pain Rating 0/10 - no pain  -LY 0/10 - no pain  -LY      Row Name 10/12/23 0924          Mobility    Extremity  Weight-bearing Status right upper extremity  -LY     Right Upper Extremity (Weight-bearing Status) non weight-bearing (NWB)  -LY       Row Name 10/12/23 0924          Bed Mobility    Comment, (Bed Mobility) up in chair  -LY       Row Name 10/12/23 0924          Transfers    Transfers sit-stand transfer;stand-sit transfer  -LY       Row Name 10/12/23 1414 10/12/23 0924       Sit-Stand Transfer    Sit-Stand [place] (Transfers) set up;contact guard;minimum assist (75% patient effort)  -LY verbal cues;minimum assist (75% patient effort)  -LY    Assistive Device (Sit-Stand Transfers) -- cane, quad  -LY      Row Name 10/12/23 0924          Stand-Sit Transfer    Stand-Sit [place] (Transfers) verbal cues;contact guard  -LY     Assistive Device (Stand-Sit Transfers) cane, quad  -LY       Row Name 10/12/23 1414          Toilet Transfer    Type (Toilet Transfer) sit-stand;stand-sit  -LY     [place] Level (Toilet Transfer) contact guard;minimum assist (75% patient effort)  -LY     Assistive Device (Toilet Transfer) commode;cane, quad  -LY       Row Name 10/12/23 1414 10/12/23 0924       Gait/Stairs (Locomotion)    [place] Level (Gait) verbal cues;contact guard  -LY verbal cues;contact guard  -LY    Assistive Device (Gait) cane, quad  -LY cane, quad  -LY    Distance in Feet (Gait) 20' x2  -LY 35'  -LY    Pattern (Gait) step-to  -LY step-to  -LY    Deviations/Abnormal Patterns (Gait) gait speed decreased;stride length decreased  -LY gait speed decreased;stride length decreased  -LY    Bilateral Gait Deviations forward flexed posture  -LY forward flexed posture  -LY    Comment, (Gait/Stairs) -- kyphotic posture, unable to correct  -LY      Row Name 10/12/23 0924          Motor Skills    Comments, Therapeutic Exercise BLE AROM x15 reps seated and reclined  -LY     Additional Documentation Comments, Therapeutic Exercise (Row)  -LY       Row Name             Wound 10/10/23 1912 Right axilla Incision    Wound -  Properties Group Placement Date: 10/10/23  -CB Placement Time: 1912 -CB Present on Original Admission: N  -CB Side: Right  -CB Location: axilla  -CB Primary Wound Type: Incision  -CB    Retired Wound - Properties Group Placement Date: 10/10/23  -CB Placement Time: 1912 -CB Present on Original Admission: N  -CB Side: Right  -CB Location: axilla  -CB Primary Wound Type: Incision  -CB    Retired Wound - Properties Group Date first assessed: 10/10/23  -CB Time first assessed: 1912 -CB Present on Original Admission: N  -CB Side: Right  -CB Location: axilla  -CB Primary Wound Type: Incision  -CB      Row Name 10/12/23 0924          Plan of Care Review    Plan of Care Reviewed With patient  -LY     Progress improving  -LY     Outcome Evaluation PT tx completed. Pt up in chair on arrival, no c/o pain at this time. Able to stand with min x1. Amb in room 35' with QC and CGA. Pt with kyphotic posture she is unable to correct. Slow gait with occasional cues for safety. Worked through BLE AROM in sitting and reclined x15 reps. Will cont to follow.  -LY       Row Name 10/12/23 1414 10/12/23 0924       Positioning and Restraints    Pre-Treatment Position sitting in chair/recliner  -LY sitting in chair/recliner  -LY    Post Treatment Position bed  -LY chair  -LY    In Bed fowlers;call light within reach;encouraged to call for assist;exit alarm on  -LY --    In Chair -- reclined;call light within reach;encouraged to call for assist  -LY              User Key  (r) = Recorded By, (t) = Taken By, (c) = Cosigned By      Initials Name Provider Type    LY Anuradha Torrez PTA Physical Therapist Assistant    Jose Cuevas, RN Registered Nurse                    Physical Therapy Education       Title: PT OT SLP Therapies (In Progress)       Topic: Physical Therapy (Done)       Point: Mobility training (Done)       Learning Progress Summary             Patient Acceptance, MANUEL GAFFNEY,MARIANO by LIAM at 10/11/2023 1410    Comment: Verbal cueing for  transfers with quad cane. Verbal cueing for sequencing with gait and for posture. Brace donning/doffing/wear schedule. WB precautions RUE.                         Point: Body mechanics (Done)       Learning Progress Summary             Patient Acceptance, E, VU,DU by LIAM at 10/11/2023 1410    Comment: Verbal cueing for transfers with quad cane. Verbal cueing for sequencing with gait and for posture. Brace donning/doffing/wear schedule. WB precautions RUE.                         Point: Precautions (Done)       Learning Progress Summary             Patient Acceptance, E, VU,DU by LIAM at 10/11/2023 1410    Comment: Verbal cueing for transfers with quad cane. Verbal cueing for sequencing with gait and for posture. Brace donning/doffing/wear schedule. WB precautions RUE.                                         User Key       Initials Effective Dates Name Provider Type Discipline    LIAM 07/13/23 -  Saurav Moise, PT Student PT Student PT                  PT Recommendation and Plan  Anticipated Discharge Disposition (PT): sub acute care setting  Plan of Care Reviewed With: patient  Progress: improving  Outcome Evaluation: PT tx completed. Pt up in chair on arrival, no c/o pain at this time. Able to stand with min x1. Amb in room 35' with QC and CGA. Pt with kyphotic posture she is unable to correct. Slow gait with occasional cues for safety. Worked through BLE AROM in sitting and reclined x15 reps. Will cont to follow.   Outcome Measures       Row Name 10/12/23 1100 10/12/23 0924          How much help from another person do you currently need...    Turning from your back to your side while in flat bed without using bedrails? -- 3  -LY     Moving from lying on back to sitting on the side of a flat bed without bedrails? -- 2  -LY     Moving to and from a bed to a chair (including a wheelchair)? -- 3  -LY     Standing up from a chair using your arms (e.g., wheelchair, bedside chair)? -- 3  -LY     Climbing 3-5 steps with a  railing? -- 2  -LY     To walk in hospital room? -- 3  -LY     AM-PAC 6 Clicks Score (PT) -- 16  -LY     Highest level of mobility -- 5 --> Static standing  -LY        How much help from another is currently needed...    Putting on and taking off regular lower body clothing? 2  -TS --     Bathing (including washing, rinsing, and drying) 2  -TS --     Toileting (which includes using toilet bed pan or urinal) 3  -TS --     Putting on and taking off regular upper body clothing 2  -TS --     Taking care of personal grooming (such as brushing teeth) 3  -TS --     Eating meals 4  -TS --     AM-PAC 6 Clicks Score (OT) 16  -TS --        Functional Assessment    Outcome Measure Options -- AM-PAC 6 Clicks Basic Mobility (PT)  -LY               User Key  (r) = Recorded By, (t) = Taken By, (c) = Cosigned By      Initials Name Provider Type    TS Cheryl Maurice COTA Occupational Therapist Assistant    Anuradha Way PTA Physical Therapist Assistant                     Time Calculation:    PT Charges       Row Name 10/12/23 1459 10/12/23 1020          Time Calculation    Start Time 1414  -LY 0924  -LY     Stop Time 1437  -LY 0948  -LY     Time Calculation (min) 23 min  -LY 24 min  -LY     PT Received On 10/12/23  -LY 10/12/23  -LY        Time Calculation- PT    Total Timed Code Minutes- PT 23 minute(s)  -LY 24 minute(s)  -LY        Timed Charges    72076 - PT Therapeutic Exercise Minutes -- 12  -LY     48447 - Gait Training Minutes  11  -LY 12  -LY     48682 - PT Therapeutic Activity Minutes 12  -LY --        Total Minutes    Timed Charges Total Minutes 23  -LY 24  -LY      Total Minutes 23  -LY 24  -LY               User Key  (r) = Recorded By, (t) = Taken By, (c) = Cosigned By      Initials Name Provider Type    Anuradha Way PTA Physical Therapist Assistant                  Therapy Charges for Today       Code Description Service Date Service Provider Modifiers Qty    69834420227  PT THER PROC EA 15 MIN  10/12/2023 Anuradha Torrez, PTA GP 1    63493451530 HC GAIT TRAINING EA 15 MIN 10/12/2023 Anuradha Torrez, PTA GP 1    05981538726 HC GAIT TRAINING EA 15 MIN 10/12/2023 Anuradha Torrez, PTA GP 1    85390235626 HC PT THERAPEUTIC ACT EA 15 MIN 10/12/2023 Anuradha Torrez, PTA GP 1            PT G-Codes  Outcome Measure Options: AM-PAC 6 Clicks Basic Mobility (PT)  AM-PAC 6 Clicks Score (PT): 16  AM-PAC 6 Clicks Score (OT): 16    Anuradha Torrez PTA  10/12/2023

## 2023-10-12 NOTE — THERAPY TREATMENT NOTE
Acute Care - Physical Therapy Treatment Note  [facility]     Patient Name: Dora Velazquez  : 1945  MRN: [id number]  Today's Date: 10/12/2023      Visit Dx:     ICD-10-CM ICD-9-CM   1. Impaired mobility [Z74.09]  Z74.09 799.89     Patient Active Problem List   Diagnosis    Paroxysmal atrial fibrillation    (HFpEF) heart failure with preserved ejection fraction    Essential hypertension    Transient cerebral ischemia    Left bundle branch block    Hyperlipidemia    Encounter for colonoscopy due to history of colonic polyp    Recurrent deep vein thrombosis (DVT)    Anemia    Gastrointestinal hemorrhage    Chest pain, unspecified type    CKD (chronic kidney disease) stage 3, GFR 30-59 ml/min    Right rotator cuff tear arthropathy     Past Medical History:   Diagnosis Date    Arthritis     Backache     Bilateral pleural effusion     Blood in feces     Blood in feces symptom    Capsulitis     Cardiomyopathy      HFpEF, improved       Chest pain     CHF (congestive heart failure)     Chronic anemia     Chronic hepatitis C     Degenerative joint disease involving multiple joints     Diabetes mellitus     Dilated cardiomyopathy     Dyslipidemia     Dyspnea     Edema of leg     Epistaxis     Essential hypertension     Fibromyalgia, primary     GERD (gastroesophageal reflux disease)     H/O endoscopy 2014    Colon endoscopy 40743    Headache     Hemorrhoids     internal & external    History of liver recipient     S/P done at [facility]        History of transfusion     Hyperlipemia     Hyperlipidemia     Hypertension     Hypokalemia     Left bundle branch block     Metatarsalgia     Metatarsalgia - with fat pad atrophie       Pap smear for cervical cancer screening 1996    Parkinson's disease     Paroxysmal atrial fibrillation     Polyp of sigmoid colon     Salmonella arthritis     Transient cerebral ischemia     Unspecified     Past Surgical History:   Procedure Laterality Date    BACK SURGERY   1995    Back Surgery (2)       CARDIAC CATHETERIZATION  11/03/2005    Orders Not Yet Performed: 0        CARPAL TUNNEL RELEASE  02/12/2007    Carpal tunnel surgery (1)      CAUTERIZATION NASAL BLEEDERS  03/09/2000    Control nose/throat bleeding (1)       CHOLECYSTECTOMY  2000    COLONOSCOPY N/A 8/14/2019    Procedure: COLONOSCOPY;  Surgeon: Chaparro Mckeon MD;  Location: [facility] ENDOSCOPY;  Service: Gastroenterology    LAPAROSCOPIC LYSIS OF ADHESIONS  07/16/1992    Laparoscopy; lysis of adhesions (1)       LIVER BIOPSY  01/05/1998    Needle biopsy of liver 98578 (1)       LIVER TRANSPLANTATION  2003    Anesth, for liver transplant (1)       SALPINGO OOPHORECTOMY  1974    Salpingo-oophorectomy (1)       SHOULDER SURGERY  08/29/2007    Shoulder surgery procedure (1)       TOTAL ABDOMINAL HYSTERECTOMY  1968    Total abd hysterectomy (1)       TOTAL SHOULDER ARTHROPLASTY W/ DISTAL CLAVICLE EXCISION Right 10/10/2023    Procedure: RIGHT REVERSE TOTAL SHOULDER ARTHROPLASTY;  Surgeon: Frantz Rosa MD;  Location: [facility] OR;  Service: Orthopedics;  Laterality: Right;     PT Assessment (last 12 hours)       PT Evaluation and Treatment       Row Name 10/12/23 0924          Physical Therapy Time and Intention    Subjective Information no complaints  -LY     Document Type therapy note (daily note)  -LY     Mode of Treatment physical therapy  -LY       Row Name 10/12/23 0924          General Information    Existing Precautions/Restrictions fall;non-weight bearing;right;shoulder  -LY       Row Name 10/12/23 0924          Pain    Pretreatment Pain Rating 0/10 - no pain  -LY     Posttreatment Pain Rating 0/10 - no pain  -LY       Row Name 10/12/23 0924          Mobility    Extremity Weight-bearing Status right upper extremity  -LY     Right Upper Extremity (Weight-bearing Status) non weight-bearing (NWB)  -LY       Row Name 10/12/23 0924          Bed Mobility    Comment, (Bed Mobility) up in chair  -LY       Row Name  10/12/23 0924          Transfers    Transfers sit-stand transfer;stand-sit transfer  -LY       Row Name 10/12/23 0924          Sit-Stand Transfer    Sit-Stand Home (Transfers) verbal cues;minimum assist (75% patient effort)  -LY     Assistive Device (Sit-Stand Transfers) cane, quad  -LY       Row Name 10/12/23 0924          Stand-Sit Transfer    Stand-Sit Home (Transfers) verbal cues;contact guard  -LY     Assistive Device (Stand-Sit Transfers) cane, quad  -LY       Row Name 10/12/23 0924          Gait/Stairs (Locomotion)    Home Level (Gait) verbal cues;contact guard  -LY     Assistive Device (Gait) cane, quad  -LY     Distance in Feet (Gait) 35'  -LY     Pattern (Gait) step-to  -LY     Deviations/Abnormal Patterns (Gait) gait speed decreased;stride length decreased  -LY     Bilateral Gait Deviations forward flexed posture  -LY     Comment, (Gait/Stairs) kyphotic posture, unable to correct  -LY       Row Name 10/12/23 0924          Motor Skills    Comments, Therapeutic Exercise BLE AROM x15 reps seated and reclined  -LY     Additional Documentation Comments, Therapeutic Exercise (Row)  -LY       Row Name             Wound 10/10/23 1912 Right axilla Incision    Wound - Properties Group Placement Date: 10/10/23  -CB Placement Time: 1912  -CB Present on Original Admission: N  -CB Side: Right  -CB Location: axilla  -CB Primary Wound Type: Incision  -CB    Retired Wound - Properties Group Placement Date: 10/10/23  -CB Placement Time: 1912  -CB Present on Original Admission: N  -CB Side: Right  -CB Location: axilla  -CB Primary Wound Type: Incision  -CB    Retired Wound - Properties Group Date first assessed: 10/10/23  -CB Time first assessed: 1912  -CB Present on Original Admission: N  -CB Side: Right  -CB Location: axilla  -CB Primary Wound Type: Incision  -CB      Row Name 10/12/23 0924          Plan of Care Review    Plan of Care Reviewed With patient  -LY     Progress improving  -LY      Outcome Evaluation PT tx completed. Pt up in chair on arrival, no c/o pain at this time. Able to stand with min x1. Amb in room 35' with QC and CGA. Pt with kyphotic posture she is unable to correct. Slow gait with occasional cues for safety. Worked through BLE AROM in sitting and reclined x15 reps. Will cont to follow.  -LY       Row Name 10/12/23 0924          Positioning and Restraints    Pre-Treatment Position sitting in chair/recliner  -LY     Post Treatment Position chair  -LY     In Chair reclined;call light within reach;encouraged to call for assist  -LY               User Key  (r) = Recorded By, (t) = Taken By, (c) = Cosigned By      Initials Name Provider Type    LY Anuradha Torrez PTA Physical Therapist Assistant    Jose Cuevas, RN Registered Nurse                    Physical Therapy Education       Title: PT OT SLP Therapies (In Progress)       Topic: Physical Therapy (Done)       Point: Mobility training (Done)       Learning Progress Summary             Patient Acceptance, E, VU,DU by LIAM at 10/11/2023 1410    Comment: Verbal cueing for transfers with quad cane. Verbal cueing for sequencing with gait and for posture. Brace donning/doffing/wear schedule. WB precautions RUE.                         Point: Body mechanics (Done)       Learning Progress Summary             Patient Acceptance, E, VU,DU by LIAM at 10/11/2023 1410    Comment: Verbal cueing for transfers with quad cane. Verbal cueing for sequencing with gait and for posture. Brace donning/doffing/wear schedule. WB precautions RUE.                         Point: Precautions (Done)       Learning Progress Summary             Patient Acceptance, E, VU,DU by LIAM at 10/11/2023 1410    Comment: Verbal cueing for transfers with quad cane. Verbal cueing for sequencing with gait and for posture. Brace donning/doffing/wear schedule. WB precautions RUE.                                         User Key       Initials Effective Dates Name Provider Type  Discipline    JS 07/13/23 -  Saurav Moise, PT Student PT Student PT                  PT Recommendation and Plan  Anticipated Discharge Disposition (PT): sub acute care setting  Plan of Care Reviewed With: patient  Progress: improving  Outcome Evaluation: PT tx completed. Pt up in chair on arrival, no c/o pain at this time. Able to stand with min x1. Amb in room 35' with QC and CGA. Pt with kyphotic posture she is unable to correct. Slow gait with occasional cues for safety. Worked through BLE AROM in sitting and reclined x15 reps. Will cont to follow.   Outcome Measures       Row Name 10/12/23 0924             How much help from another person do you currently need...    Turning from your back to your side while in flat bed without using bedrails? 3  -LY      Moving from lying on back to sitting on the side of a flat bed without bedrails? 2  -LY      Moving to and from a bed to a chair (including a wheelchair)? 3  -LY      Standing up from a chair using your arms (e.g., wheelchair, bedside chair)? 3  -LY      Climbing 3-5 steps with a railing? 2  -LY      To walk in hospital room? 3  -LY      AM-PAC 6 Clicks Score (PT) 16  -LY      Highest level of mobility 5 --> Static standing  -LY         Functional Assessment    Outcome Measure Options AM-PAC 6 Clicks Basic Mobility (PT)  -LY                User Key  (r) = Recorded By, (t) = Taken By, (c) = Cosigned By      Initials Name Provider Type    LY Anuradha Torrez, PTA Physical Therapist Assistant                     Time Calculation:    PT Charges       Row Name 10/12/23 1020             Time Calculation    Start Time 0924  -LY      Stop Time 0948  -LY      Time Calculation (min) 24 min  -LY      PT Received On 10/12/23  -LY         Time Calculation- PT    Total Timed Code Minutes- PT 24 minute(s)  -LY         Timed Charges    85627 - PT Therapeutic Exercise Minutes 12  -LY      93538 - Gait Training Minutes  12  -LY         Total Minutes    Timed Charges Total  Minutes 24  -LY       Total Minutes 24  -LY                User Key  (r) = Recorded By, (t) = Taken By, (c) = Cosigned By      Initials Name Provider Type    Anuradha Way PTA Physical Therapist Assistant                  Therapy Charges for Today       Code Description Service Date Service Provider Modifiers Qty    63246508081 HC PT THER PROC EA 15 MIN 10/12/2023 Anuradha Torrez, CAROLYN GP 1    98117495049 HC GAIT TRAINING EA 15 MIN 10/12/2023 Anuradha Torrez, CAROLYN GP 1            PT G-Codes  Outcome Measure Options: AM-PAC 6 Clicks Basic Mobility (PT)  AM-PAC 6 Clicks Score (PT): 16  AM-PAC 6 Clicks Score (OT): 14    Anuradha Torrez PTA  10/12/2023

## 2023-10-12 NOTE — PLAN OF CARE
Goal Outcome Evaluation:  Plan of Care Reviewed With: patient, family        Progress: improving     VSS for majority of shift, low grade fever noted this evening, no other vital signs changed, pt denies feeling feverish, encouraged use if IS, treated with PRN PO tylenol, pt worked well with therapy this shift, tolerating diet, continent of b/b, IVF, RA, SCDs, right shoulder immobilizer in place, awaiting insurance for dispo. Spoke with son, Cele, at [phone] for pt updates via phone, all questions and concerns address, safety maintained, will continue to monitor.

## 2023-10-12 NOTE — PLAN OF CARE
Goal Outcome Evaluation:  Plan of Care Reviewed With: patient        Progress: improving  Outcome Evaluation: PT tx completed. Pt up in chair on arrival, no c/o pain at this time. Able to stand with min x1. Amb in room 35' with QC and CGA. Pt with kyphotic posture she is unable to correct. Slow gait with occasional cues for safety. Worked through BLE AROM in sitting and reclined x15 reps. Will cont to follow.      Anticipated Discharge Disposition (PT): sub acute care setting

## 2023-10-12 NOTE — THERAPY TREATMENT NOTE
Acute Care - Occupational Therapy Treatment Note  [facility]     Patient Name: Dora Velazquez  : 1945  MRN: [id number]  Today's Date: 10/12/2023             Admit Date: 10/10/2023       ICD-10-CM ICD-9-CM   1. Impaired mobility [Z74.09]  Z74.09 799.89   2. Decreased activities of daily living (ADL) [Z78.9]  Z78.9 V49.89     Patient Active Problem List   Diagnosis    Paroxysmal atrial fibrillation    (HFpEF) heart failure with preserved ejection fraction    Essential hypertension    Transient cerebral ischemia    Left bundle branch block    Hyperlipidemia    Encounter for colonoscopy due to history of colonic polyp    Recurrent deep vein thrombosis (DVT)    Anemia    Gastrointestinal hemorrhage    Chest pain, unspecified type    CKD (chronic kidney disease) stage 3, GFR 30-59 ml/min    Right rotator cuff tear arthropathy     Past Medical History:   Diagnosis Date    Arthritis     Backache     Bilateral pleural effusion     Blood in feces     Blood in feces symptom    Capsulitis     Cardiomyopathy      HFpEF, improved       Chest pain     CHF (congestive heart failure)     Chronic anemia     Chronic hepatitis C     Degenerative joint disease involving multiple joints     Diabetes mellitus     Dilated cardiomyopathy     Dyslipidemia     Dyspnea     Edema of leg     Epistaxis     Essential hypertension     Fibromyalgia, primary     GERD (gastroesophageal reflux disease)     H/O endoscopy 2014    Colon endoscopy 62160    Headache     Hemorrhoids     internal & external    History of liver recipient     S/P done at [facility]        History of transfusion     Hyperlipemia     Hyperlipidemia     Hypertension     Hypokalemia     Left bundle branch block     Metatarsalgia     Metatarsalgia - with fat pad atrophie       Pap smear for cervical cancer screening 1996    Parkinson's disease     Paroxysmal atrial fibrillation     Polyp of sigmoid colon     Salmonella arthritis     Transient cerebral  ischemia     Unspecified     Past Surgical History:   Procedure Laterality Date    BACK SURGERY  1995    Back Surgery (2)       CARDIAC CATHETERIZATION  11/03/2005    Orders Not Yet Performed: 0        CARPAL TUNNEL RELEASE  02/12/2007    Carpal tunnel surgery (1)      CAUTERIZATION NASAL BLEEDERS  03/09/2000    Control nose/throat bleeding (1)       CHOLECYSTECTOMY  2000    COLONOSCOPY N/A 8/14/2019    Procedure: COLONOSCOPY;  Surgeon: Chaparro Mckeon MD;  Location: [facility] ENDOSCOPY;  Service: Gastroenterology    LAPAROSCOPIC LYSIS OF ADHESIONS  07/16/1992    Laparoscopy; lysis of adhesions (1)       LIVER BIOPSY  01/05/1998    Needle biopsy of liver 40727 (1)       LIVER TRANSPLANTATION  2003    Anesth, for liver transplant (1)       SALPINGO OOPHORECTOMY  1974    Salpingo-oophorectomy (1)       SHOULDER SURGERY  08/29/2007    Shoulder surgery procedure (1)       TOTAL ABDOMINAL HYSTERECTOMY  1968    Total abd hysterectomy (1)       TOTAL SHOULDER ARTHROPLASTY W/ DISTAL CLAVICLE EXCISION Right 10/10/2023    Procedure: RIGHT REVERSE TOTAL SHOULDER ARTHROPLASTY;  Surgeon: Frantz Rosa MD;  Location: [facility] OR;  Service: Orthopedics;  Laterality: Right;         OT ASSESSMENT FLOWSHEET (last 12 hours)       OT Evaluation and Treatment       Row Name 10/12/23 1014                   OT Time and Intention    Subjective Information complains of;pain  -TS        Document Type therapy note (daily note)  -TS        Mode of Treatment occupational therapy  -TS           General Information    Existing Precautions/Restrictions fall  -TS           Pain Assessment    Pretreatment Pain Rating 2/10  -TS        Posttreatment Pain Rating 2/10  -TS        Pain Location - Side/Orientation Right  -TS        Pain Location - shoulder  -TS        Pain Intervention(s) Repositioned  -TS           Cognition    Personal Safety Interventions fall prevention program maintained;nonskid shoes/slippers when out of bed;gait belt  -TS            Activities of Daily Living    BADL Assessment/Intervention upper body dressing;lower body dressing;toileting;grooming  -TS           Upper Body Dressing Assessment/Training    White Level (Upper Body Dressing) don;doff;pull-over garment;moderate assist (50% patient effort)  -TS        Position (Upper Body Dressing) unsupported sitting  -TS           Lower Body Dressing Assessment/Training    White Level (Lower Body Dressing) don;doff;shoes/slippers;socks;pants/bottoms;set up;moderate assist (50% patient effort)  -TS        Position (Lower Body Dressing) unsupported sitting;supported standing  -TS           Grooming Assessment/Training    White Level (Grooming) wash face, hands;supervision  -TS        Position (Grooming) unsupported sitting  -TS           Toileting Assessment/Training    White Level (Toileting) toileting skills;supervision;adjust/manage clothing;perform perineal hygiene;minimum assist (75% patient effort);moderate assist (50% patient effort)  -TS        Assistive Devices (Toileting) commode  -TS        Position (Toileting) unsupported sitting;supported standing  -TS           Functional Mobility    Functional Mobility- Ind. Level contact guard assist  -TS        Functional Mobility- Device cane, quad  -TS        Functional Mobility- Comment in room, in BR  -TS           Transfer Assessment/Treatment    Transfers sit-stand transfer;stand-sit transfer;toilet transfer  -TS           Sit-Stand Transfer    Sit-Stand White (Transfers) set up;contact guard;minimum assist (75% patient effort)  -TS           Stand-Sit Transfer    Stand-Sit White (Transfers) contact guard  -TS           Toilet Transfer    Type (Toilet Transfer) sit-stand;stand-sit  -TS        White Level (Toilet Transfer) contact guard;minimum assist (75% patient effort)  -TS        Assistive Device (Toilet Transfer) commode;cane, quad  -TS           Orthotics & Prosthetics Management     Orthosis Location upper extremity orthosis  -TS        Additional Documentation Orthosis Location (Row)  -TS           Upper Extremity Orthosis Management    Type (Upper Extremity Orthosis) shoulder immobilizer  -TS        Wearing Schedule (Upper Extremity Orthosis) remove for hygiene/bathing;wear full-time  -TS        Orthosis Training (Upper Extremity Orthosis) patient;cleaning/care of orthosis;donning/doffing orthosis;orthosis adjustment;wearing schedule  -TS           Wound 10/10/23 1912 Right axilla Incision    Wound - Properties Group Placement Date: 10/10/23  -CB Placement Time: 1912 -CB Present on Original Admission: N  -CB Side: Right  -CB Location: axilla  -CB Primary Wound Type: Incision  -CB    Retired Wound - Properties Group Placement Date: 10/10/23  -CB Placement Time: 1912 -CB Present on Original Admission: N  -CB Side: Right  -CB Location: axilla  -CB Primary Wound Type: Incision  -CB    Retired Wound - Properties Group Date first assessed: 10/10/23  -CB Time first assessed: 1912 -CB Present on Original Admission: N  -CB Side: Right  -CB Location: axilla  -CB Primary Wound Type: Incision  -CB       Plan of Care Review    Plan of Care Reviewed With patient  -TS        Progress improving  -TS        Outcome Evaluation Pt transfers with CGA/min A with quad cane. Pt ambulates with CGA. Pt mod A for UB/LB dressing and min/mod A for TB sponge bath. Pt educated on shoulder immobilizer and verbalizes understanding. Pt would benefit from swing bed at discharge as pt does have assist of caregiver when she returns home. Continue OT POC  -TS           Positioning and Restraints    Pre-Treatment Position sitting in chair/recliner  -TS        Post Treatment Position chair  -TS        In Chair sitting;call light within reach;encouraged to call for assist;exit alarm on;RUE elevated;with brace  -TS                  User Key  (r) = Recorded By, (t) = Taken By, (c) = Cosigned By      Initials Name Effective Dates     Cheryl Butcher COTA 02/03/23 -     CB Jose Monte, SYLVIA 04/28/22 -                      Occupational Therapy Education       Title: PT OT SLP Therapies (In Progress)       Topic: Occupational Therapy (In Progress)       Point: ADL training (Done)       Description:   Instruct learner(s) on proper safety adaptation and remediation techniques during self care or transfers.   Instruct in proper use of assistive devices.                  Learning Progress Summary             Patient Acceptance, E, VU,NR by  at 10/11/2023 0847                         Point: Home exercise program (Not Started)       Description:   Instruct learner(s) on appropriate technique for monitoring, assisting and/or progressing therapeutic exercises/activities.                  Learner Progress:  Not documented in this visit.              Point: Precautions (Done)       Description:   Instruct learner(s) on prescribed precautions during self-care and functional transfers.                  Learning Progress Summary             Patient Acceptance, E, VU,NR by  at 10/11/2023 0847                         Point: Body mechanics (Done)       Description:   Instruct learner(s) on proper positioning and spine alignment during self-care, functional mobility activities and/or exercises.                  Learning Progress Summary             Patient Acceptance, E, VU,NR by  at 10/11/2023 0847                                         User Key       Initials Effective Dates Name Provider Type Discipline     06/20/22 -  Anuradha Sanchez, OTR/L Occupational Therapist OT                      OT Recommendation and Plan     Plan of Care Review  Plan of Care Reviewed With: patient  Progress: improving  Outcome Evaluation: Pt transfers with CGA/min A with quad cane. Pt ambulates with CGA. Pt mod A for UB/LB dressing and min/mod A for TB sponge bath. Pt educated on shoulder immobilizer and verbalizes understanding. Pt would benefit from swing bed at  discharge as pt does have assist of caregiver when she returns home. Continue OT POC  Plan of Care Reviewed With: patient  Outcome Evaluation: Pt transfers with CGA/min A with quad cane. Pt ambulates with CGA. Pt mod A for UB/LB dressing and min/mod A for TB sponge bath. Pt educated on shoulder immobilizer and verbalizes understanding. Pt would benefit from swing bed at discharge as pt does have assist of caregiver when she returns home. Continue OT POC     Outcome Measures       Row Name 10/12/23 1100 10/12/23 0924          How much help from another person do you currently need...    Turning from your back to your side while in flat bed without using bedrails? -- 3  -LY     Moving from lying on back to sitting on the side of a flat bed without bedrails? -- 2  -LY     Moving to and from a bed to a chair (including a wheelchair)? -- 3  -LY     Standing up from a chair using your arms (e.g., wheelchair, bedside chair)? -- 3  -LY     Climbing 3-5 steps with a railing? -- 2  -LY     To walk in hospital room? -- 3  -LY     AM-PAC 6 Clicks Score (PT) -- 16  -LY     Highest level of mobility -- 5 --> Static standing  -LY        How much help from another is currently needed...    Putting on and taking off regular lower body clothing? 2  -TS --     Bathing (including washing, rinsing, and drying) 2  -TS --     Toileting (which includes using toilet bed pan or urinal) 3  -TS --     Putting on and taking off regular upper body clothing 2  -TS --     Taking care of personal grooming (such as brushing teeth) 3  -TS --     Eating meals 4  -TS --     AM-PAC 6 Clicks Score (OT) 16  -TS --        Functional Assessment    Outcome Measure Options -- AM-PAC 6 Clicks Basic Mobility (PT)  -LY               User Key  (r) = Recorded By, (t) = Taken By, (c) = Cosigned By      Initials Name Provider Type    Cheryl Butcher COTA Occupational Therapist Assistant    Anuradha Way, PTA Physical Therapist Assistant                     Time Calculation:    Time Calculation- OT       Row Name 10/12/23 1142 10/12/23 1020          Time Calculation- OT    OT Start Time 1014  -TS --     OT Stop Time 1123  -TS --     OT Time Calculation (min) 69 min  -TS --     Total Timed Code Minutes- OT 69 minute(s)  -TS --     OT Received On 10/12/23  -TS --        Timed Charges    47694 - Gait Training Minutes  -- 12  -LY     12019 - OT Self Care/Mgmt Minutes 69  -TS --        Total Minutes    Timed Charges Total Minutes 69  -TS 12  -LY      Total Minutes 69  -TS 12  -LY               User Key  (r) = Recorded By, (t) = Taken By, (c) = Cosigned By      Initials Name Provider Type    TS Cheryl Maurice COTA Occupational Therapist Assistant    LY Anuradha Torrez, PTA Physical Therapist Assistant                  Therapy Charges for Today       Code Description Service Date Service Provider Modifiers Qty    [id number] HC OT SELF CARE/MGMT/TRAIN EA 15 MIN 10/12/2023 Cheryl Maurice COTA GO 5                 ELMIRA Ruth  10/12/2023

## 2023-10-12 NOTE — PLAN OF CARE
Goal Outcome Evaluation:  Plan of Care Reviewed With: patient        Progress: improving  Outcome Evaluation: A/O x 4. Patient sat up in chair for a few hours this evening, with immobilizer in place. IV fluids. No PRNs requested this shift. No new concerns. Safety maintained.

## 2023-10-12 NOTE — PLAN OF CARE
Goal Outcome Evaluation:  Plan of Care Reviewed With: patient        Progress: improving  Outcome Evaluation: Pt transfers with CGA/min A with quad cane. Pt ambulates with CGA. Pt mod A for UB/LB dressing and min/mod A for TB sponge bath. Pt educated on shoulder immobilizer and verbalizes understanding. Pt would benefit from swing bed at discharge as pt does have assist of caregiver when she returns home. Continue OT POC

## 2023-10-12 NOTE — PROGRESS NOTES
1         [facility] Services  INPATIENT PROGRESS NOTE    Patient Name: Dora Velazquez  Date of Admission: 10/10/2023  Today's Date: 10/12/23  Length of Stay: 0  Primary Care Physician: Deonna Cornejo MD    Subjective   Chief Complaint: f/u   HPI   Hospitalist consulted on October 11 to assist in medical management.  Patient has diabetes, hypertension, hepatitis C, Parkinson's disease, chronic systolic heart failure.  Patient is on chronic anticoagulation reportedly for history of blood clots.  She underwent reverse right shoulder arthroplasty on October 10.    Hemodynamically stable with blood pressure in the 117-122  Maintaining adequate oxygenation in room.    Noted to have a drop in hemoglobin 8.5 (10)      Pharmacist informed me yesterday that based on renal function she should be on 15 mg daily for the history of DVT PE.  I deferred to surgeon if okay to increase from surgical standpoint but otherwise okay from medical standpoint    Review of Systems   All pertinent negatives and positives are as above. All other systems have been reviewed and are negative unless otherwise stated.     Objective    Temp:  [97.5 °F (36.4 °C)-99.2 °F (37.3 °C)] 98.4 °F (36.9 °C)  Heart Rate:  [82-87] 85  Resp:  [16-18] 18  BP: ()/(51-56) 122/56  Physical Exam  Pleasant woman  No distress  Seated comfortably on recliner  No gross tremors today    GEN: Awake, alert, interactive, in NAD  HEENT: Atraumatic, PERRLA, EOMI, Anicteric, Trachea midline  Lungs: CTAB, no wheezing/rales/rhonchi  Heart: RRR, +S1/s2, no rub  ABD: soft, nt/nd, +BS, no guarding/rebound  Extremities: atraumatic, no cyanosis, no edema  Skin: no rashes or lesions  Neuro: AAOx3, no focal deficits  Psych: normal mood & affect      Results Review:  I have reviewed the labs, radiology results, and diagnostic studies.    Laboratory Data:   Results from last 7 days   Lab Units 10/12/23  0697 10/11/23  0448   WBC  "10*3/mm3 12.69*  --    HEMOGLOBIN g/dL 8.5* 10.0*   HEMATOCRIT % 27.8* 33.5*   PLATELETS 10*3/mm3 141  --         Results from last 7 days   Lab Units 10/12/23  0657 10/11/23  0448 10/10/23  1357   SODIUM mmol/L 136 140 138   POTASSIUM mmol/L 3.9 4.2 4.1   CHLORIDE mmol/L 103 105 100   CO2 mmol/L 25.0 25.0 28.0   BUN mg/dL 21 16 18   CREATININE mg/dL 1.13* 0.81 0.92   CALCIUM mg/dL 8.2* 8.4* 9.5   BILIRUBIN mg/dL  --   --  0.9   ALK PHOS U/L  --   --  143*   ALT (SGPT) U/L  --   --  11   AST (SGOT) U/L  --   --  19   GLUCOSE mg/dL 146* 135* 144*       Culture Data:   No results found for: \"BLOODCX\", \"URINECX\", \"WOUNDCX\", \"MRSACX\", \"RESPCX\", \"STOOLCX\"    Radiology Data:   Imaging Results (Last 24 Hours)       ** No results found for the last 24 hours. **            I have reviewed the patient's current medications.     Assessment/Plan   Assessment  Active Hospital Problems    Diagnosis     **Right rotator cuff tear arthropathy          Medical Decision Making  Number and Complexity of problems:   Status post right reverse shoulder arthroplasty on October 10  Hypertension-adequately controlled  Chronic systolic heart failure not in decompensation.  Diabetes mellitus type 2 with hyperglycemia, on insulin treated; A1c 6.3%  Chronic anticoagulation.  History of blood clot, paroxysmal atrial fibrillation  Parkinsons disease  Hyperlipidemia  Chronic Hepatitis C  On immunosuppressant Sirolimus per home medication-history of liver transplant in 2003    Treatment Plan  Pharmacist informed me yesterday that based on renal function she should be on 15 mg daily for the history of DVT PE.  I deferred to surgeon if okay to increase from surgical standpoint but otherwise okay from medical standpoint.  I revisited the past medical history and noted paroxysmal atrial fibrillation also.  This is now reflected in current medication    Patient not currently on telemetry but last EKG from October 3 showed normal sinus rhythm.    I " reviewed note from a prior cardiology visit.  There are some limitation in guideline directed medical treatment as outlined below.  Continue on statin, metoprolol succinate (ideally would have been on carvedilol but for some reason on metoprolol tartrate.  Carvedilol metoprolol succinate has drug interaction with medication), spironolactone     Tolerating change in medication with adequate heart rate control and blood pressure reading.  Continue current management.    Monitor for any signs of bleeding.  Transfuse as needed for hemoglobin less than 7 or for any symptoms of anemia if hemoglobin less than 8    Labs reviewed in correlation to her condition.  Noted increase in creatinine.  Patient on spironolactone and Lasix.  Does not appear to be volume overloaded may be considered cutting back or completely discontinuing Lasix with monitoring of volume status, evidence of fluid retention or congestion.  I am not sure if the Lasix/spironolactone was in relation to history of hepatitis C and presence of ascites.  Again I do not see that she is volume overloaded.  Probably be better to cut back on Lasix and follow closely.    Meds reviewed.  Continue present management.  atorvastatin, 20 mg, Oral, Nightly  carbidopa-levodopa, 2 tablet, Oral, TID  famotidine, 40 mg, Oral, Daily  furosemide, 40 mg, Oral, Daily  gabapentin, 100 mg, Oral, Nightly  isosorbide mononitrate, 120 mg, Oral, Daily  linagliptin, 5 mg, Oral, Daily  metoprolol succinate XL, 25 mg, Oral, Q24H  rivaroxaban, 15 mg, Oral, Daily With Dinner  sirolimus, 0.5 mg, Oral, Daily  sodium chloride, 10 mL, Intravenous, Q12H  spironolactone, 25 mg, Oral, Daily      Recheck BMP in the morning and H&H.    Conditions and Status  Stable.      [facility] Data  External documents reviewed:  Results for orders placed in visit on 09/15/22    Adult Transthoracic Echo Complete W/ Cont if Necessary Per Protocol    Interpretation Summary  · Calculated left ventricular 3D EF = 46%  Estimated left ventricular EF = 45% Left ventricular ejection fraction appears to be 41 - 45%. Left ventricular systolic function is low normal.  · The left ventricular cavity is borderline dilated.  · Left ventricular diastolic dysfunction is noted.  · Left atrial volume is severely increased.  · Estimated right ventricular systolic pressure from tricuspid regurgitation is normal (<35 mmHg).      Cardiac tracing (EKG, telemetry) interpretation: none during this hospitalization.  October 3 EKG reviewed  Radiology interpretation: Nothing new  Labs reviewed: Reviewed as outlined above  Any tests that were considered but not ordered: None     Decision rules/scores evaluated (example [id number], Wells, etc): None     Discussed with: Patient and PT     Care Planning  Shared decision making: Patient and family with attending  Code status and discussions: Full code    Disposition  Social Determinants of Health that impact treatment or disposition: None identified at this time.  Anticipate discharge to swing bed when available.  I expect the patient to be discharged to swing bed when available.  Disposition per attending electronically signed by David Katz MD, 10/12/23, 10:38 CDT.

## 2023-10-12 NOTE — PROGRESS NOTES
Orthopedic Surgery Progress Note    Dora Velazquez  10/12/2023      Subjective:     Systemic or Specific Complaints: resting In bed, pain controlled     Objective:     Patient Vitals for the past 24 hrs:   BP Temp Temp src Pulse Resp SpO2   10/12/23 0421 117/54 99.2 °F (37.3 °C) Oral 86 18 95 %   10/11/23 1945 129/52 97.6 °F (36.4 °C) Oral 82 16 95 %   10/11/23 1618 98/51 97.5 °F (36.4 °C) Oral 87 16 96 %   10/11/23 0818 116/97 97.6 °F (36.4 °C) Oral 85 16 96 %       right upper  General: alert, appears stated age and cooperative   Wound: covered             Dressing: Clean, dry, intact   Extremity: Distal NVI           DVT Exam: No evidence of DVT                   Data Review:  Lab Results (last 24 hours)       Procedure Component Value Units Date/Time    CBC (No Diff) [[id number]]  (Abnormal) Collected: 10/12/23 0657    Specimen: Blood Updated: 10/12/23 0725     WBC 12.69 10*3/mm3      RBC 2.89 10*6/mm3      Hemoglobin 8.5 g/dL      Hematocrit 27.8 %      MCV 96.2 fL      MCH 29.4 pg      MCHC 30.6 g/dL      RDW 14.4 %      RDW-SD 50.0 fl      MPV 10.5 fL      Platelets 141 10*3/mm3     Basic Metabolic Panel [[id number]] Collected: 10/12/23 0657    Specimen: Blood Updated: 10/12/23 0713    Hemoglobin A1c [[id number]]  (Abnormal) Collected: 10/11/23 0951    Specimen: Blood Updated: 10/11/23 1244     Hemoglobin A1C 6.30 %     Narrative:      Hemoglobin A1C Ranges:    Increased Risk for Diabetes  5.7% to 6.4%  Diabetes                     >= 6.5%  Diabetic Goal                < 7.0%          Imaging Results (Last 24 Hours)       ** No results found for the last 24 hours. **            Assessment:   2 Days Post-Op  Right Reverse TSA     Plan:      1:  DVT prophylaxis, ICE, elevate  2:  Pain control  3:  Physical therapy/Occupational therapy  4:  Anticipate discharge in 1-3 days, may require placement, family requesting [facility].   5:  CAROLINA Hutchison PA-C

## 2023-10-13 VITALS
BODY MASS INDEX: 22.14 KG/M2 | HEART RATE: 77 BPM | WEIGHT: 120.3 LBS | TEMPERATURE: 98.4 F | OXYGEN SATURATION: 97 % | SYSTOLIC BLOOD PRESSURE: 141 MMHG | DIASTOLIC BLOOD PRESSURE: 63 MMHG | RESPIRATION RATE: 16 BRPM | HEIGHT: 62 IN

## 2023-10-13 LAB
ANION GAP SERPL CALCULATED.3IONS-SCNC: 10 MMOL/L (ref 5–15)
BUN SERPL-MCNC: 15 MG/DL (ref 8–23)
BUN/CREAT SERPL: 15.8 (ref 7–25)
CALCIUM SPEC-SCNC: 7.7 MG/DL (ref 8.6–10.5)
CHLORIDE SERPL-SCNC: 100 MMOL/L (ref 98–107)
CO2 SERPL-SCNC: 25 MMOL/L (ref 22–29)
CREAT SERPL-MCNC: 0.95 MG/DL (ref 0.57–1)
EGFRCR SERPLBLD CKD-EPI 2021: 61.1 ML/MIN/1.73
GLUCOSE SERPL-MCNC: 124 MG/DL (ref 65–99)
HCT VFR BLD AUTO: 24.7 % (ref 34–46.6)
HGB BLD-MCNC: 7.8 G/DL (ref 12–15.9)
POTASSIUM SERPL-SCNC: 3.6 MMOL/L (ref 3.5–5.2)
SODIUM SERPL-SCNC: 135 MMOL/L (ref 136–145)

## 2023-10-13 PROCEDURE — A9270 NON-COVERED ITEM OR SERVICE: HCPCS | Performed by: INTERNAL MEDICINE

## 2023-10-13 PROCEDURE — A9270 NON-COVERED ITEM OR SERVICE: HCPCS | Performed by: ORTHOPAEDIC SURGERY

## 2023-10-13 PROCEDURE — 97110 THERAPEUTIC EXERCISES: CPT

## 2023-10-13 PROCEDURE — 80048 BASIC METABOLIC PNL TOTAL CA: CPT | Performed by: ORTHOPAEDIC SURGERY

## 2023-10-13 PROCEDURE — 63710000001 CARBIDOPA-LEVODOPA 25-100 MG TABLET: Performed by: ORTHOPAEDIC SURGERY

## 2023-10-13 PROCEDURE — 63710000001 METOPROLOL SUCCINATE XL 25 MG TABLET SUSTAINED-RELEASE 24 HOUR: Performed by: INTERNAL MEDICINE

## 2023-10-13 PROCEDURE — 63710000001 TRAMADOL 50 MG TABLET: Performed by: INTERNAL MEDICINE

## 2023-10-13 PROCEDURE — 85018 HEMOGLOBIN: CPT | Performed by: INTERNAL MEDICINE

## 2023-10-13 PROCEDURE — 63710000001 LINAGLIPTIN 5 MG TABLET: Performed by: ORTHOPAEDIC SURGERY

## 2023-10-13 PROCEDURE — 63710000001 SIROLIMUS 0.5 MG TABLET: Performed by: ORTHOPAEDIC SURGERY

## 2023-10-13 PROCEDURE — 63710000001 ISOSORBIDE MONONITRATE 60 MG TABLET SUSTAINED-RELEASE 24 HOUR: Performed by: ORTHOPAEDIC SURGERY

## 2023-10-13 PROCEDURE — 63710000001 FUROSEMIDE 40 MG TABLET: Performed by: ORTHOPAEDIC SURGERY

## 2023-10-13 PROCEDURE — 97116 GAIT TRAINING THERAPY: CPT

## 2023-10-13 PROCEDURE — 85014 HEMATOCRIT: CPT | Performed by: INTERNAL MEDICINE

## 2023-10-13 PROCEDURE — 63710000001 SPIRONOLACTONE 25 MG TABLET: Performed by: ORTHOPAEDIC SURGERY

## 2023-10-13 PROCEDURE — 63710000001 FAMOTIDINE 20 MG TABLET: Performed by: INTERNAL MEDICINE

## 2023-10-13 PROCEDURE — 25810000003 LACTATED RINGERS PER 1000 ML: Performed by: ORTHOPAEDIC SURGERY

## 2023-10-13 RX ADMIN — LINAGLIPTIN 5 MG: 5 TABLET, FILM COATED ORAL at 09:13

## 2023-10-13 RX ADMIN — SPIRONOLACTONE 25 MG: 25 TABLET ORAL at 09:13

## 2023-10-13 RX ADMIN — SODIUM CHLORIDE, POTASSIUM CHLORIDE, SODIUM LACTATE AND CALCIUM CHLORIDE 100 ML/HR: 600; 310; 30; 20 INJECTION, SOLUTION INTRAVENOUS at 01:59

## 2023-10-13 RX ADMIN — ISOSORBIDE MONONITRATE 120 MG: 60 TABLET, EXTENDED RELEASE ORAL at 09:15

## 2023-10-13 RX ADMIN — SIROLIMUS 0.5 MG: 0.5 TABLET, FILM COATED ORAL at 09:13

## 2023-10-13 RX ADMIN — FUROSEMIDE 40 MG: 40 TABLET ORAL at 09:13

## 2023-10-13 RX ADMIN — METOPROLOL SUCCINATE 25 MG: 25 TABLET, EXTENDED RELEASE ORAL at 09:13

## 2023-10-13 RX ADMIN — FAMOTIDINE 20 MG: 20 TABLET, FILM COATED ORAL at 09:13

## 2023-10-13 RX ADMIN — CARBIDOPA AND LEVODOPA 2 TABLET: 25; 100 TABLET ORAL at 09:13

## 2023-10-13 RX ADMIN — TRAMADOL HYDROCHLORIDE 50 MG: 50 TABLET, COATED ORAL at 11:13

## 2023-10-13 NOTE — PROGRESS NOTES
Orthopedic Surgery Progress Note    Dora Velazquez  10/13/2023      Subjective:     Systemic or Specific Complaints: resting In bed, pain controlled     Objective:     Patient Vitals for the past 24 hrs:   BP Temp Temp src Pulse Resp SpO2   10/13/23 0020 121/54 98.6 °F (37 °C) Oral 79 18 96 %   10/12/23 2021 110/56 -- Oral 83 18 95 %   10/12/23 1613 125/63 100 °F (37.8 °C) Oral 100 18 96 %   10/12/23 0816 122/56 98.4 °F (36.9 °C) Oral 85 18 95 %       right upper  General: alert, appears stated age and cooperative   Wound: covered             Dressing: Clean, dry, intact   Extremity: Distal NVI           DVT Exam: No evidence of DVT                   Data Review:  Lab Results (last 24 hours)       Procedure Component Value Units Date/Time    Basic Metabolic Panel [[id number]] Collected: 10/13/23 0606    Specimen: Blood Updated: 10/13/23 0723    Hemoglobin & Hematocrit, Blood [[id number]] Collected: 10/13/23 0606    Specimen: Blood Updated: 10/13/23 0723    Basic Metabolic Panel [[id number]]  (Abnormal) Collected: 10/12/23 0657    Specimen: Blood Updated: 10/12/23 0744     Glucose 146 mg/dL      BUN 21 mg/dL      Creatinine 1.13 mg/dL      Sodium 136 mmol/L      Potassium 3.9 mmol/L      Chloride 103 mmol/L      CO2 25.0 mmol/L      Calcium 8.2 mg/dL      BUN/Creatinine Ratio 18.6     Anion Gap 8.0 mmol/L      eGFR 49.6 mL/min/1.73     Narrative:      GFR Normal >60  Chronic Kidney Disease <60  Kidney Failure <15    The GFR formula is only valid for adults with stable renal function between ages 18 and 70.    CBC (No Diff) [[id number]]  (Abnormal) Collected: 10/12/23 0657    Specimen: Blood Updated: 10/12/23 0725     WBC 12.69 10*3/mm3      RBC 2.89 10*6/mm3      Hemoglobin 8.5 g/dL      Hematocrit 27.8 %      MCV 96.2 fL      MCH 29.4 pg      MCHC 30.6 g/dL      RDW 14.4 %      RDW-SD 50.0 fl      MPV 10.5 fL      Platelets 141 10*3/mm3           Imaging Results (Last 24 Hours)       ** No results found for the  last 24 hours. **            Assessment:   3 Days Post-Op  Right Reverse TSA   Acute Post Op Blood Loss Anemia  Plan:      1:  DVT prophylaxis, ICE, elevate  2:  Pain control  3:  Physical therapy/Occupational therapy  4:  Anticipate discharge in 1-3 days, may require placement, family requesting [facility].   5:  CAROLINA Hutchison PA-C

## 2023-10-13 NOTE — PLAN OF CARE
Goal Outcome Evaluation:  Plan of Care Reviewed With: (P) patient        Progress: (P) improving  Outcome Evaluation: (P) Pt with no complaints of pain. Pt up in chair. Sit <> stand <> sit CGA/min A, cues required for UE use during transfers. Pt amb in room 20' x 2 with QC, decreased step length and narrow TORRI. Pt amb with forward flexed posture, unable to correct with cues. BLE AROM in sitting and reclined x 15.

## 2023-10-13 NOTE — PROGRESS NOTES
[facility] Medicine Services  INPATIENT PROGRESS NOTE    Patient Name: Dora Velazquez  Date of Admission: 10/10/2023  Today's Date: 10/13/23  Length of Stay: 0  Primary Care Physician: Deonna Cornejo MD    Subjective   Chief Complaint:   HPI   Patient is doing well and has no new complaints  No reported bleeding  States that she is anticipating transfer to swing bed today  Her caregiver is at bedside.          Review of Systems   All pertinent negatives and positives are as above. All other systems have been reviewed and are negative unless otherwise stated.     Objective    Temp:  [98.4 °F (36.9 °C)-100 °F (37.8 °C)] 98.4 °F (36.9 °C)  Heart Rate:  [] 77  Resp:  [16-18] 16  BP: (110-141)/(54-63) 141/63  Physical Exam  She has no gross visible tremors.  She is very much interactive  Pleasant woman  No distress  Seated comfortably on recliner  No gross tremors today    GEN: Awake, alert, interactive, in NAD  HEENT: Atraumatic, PERRLA, EOMI, Anicteric, Trachea midline  Lungs: CTAB, no wheezing/rales/rhonchi  Heart: RRR, +S1/s2, no rub  ABD: soft, nt/nd, +BS, no guarding/rebound  Extremities: atraumatic, no cyanosis, no edema lower extremities  Skin: no rashes or lesions  Neuro: AAOx3, no focal deficits  Psych: normal mood & affect      Results Review:  I have reviewed the labs, radiology results, and diagnostic studies.    Laboratory Data:   Results from last 7 days   Lab Units 10/13/23  0606 10/12/23  0657 10/11/23  0448   WBC 10*3/mm3  --  12.69*  --    HEMOGLOBIN g/dL 7.8* 8.5* 10.0*   HEMATOCRIT % 24.7* 27.8* 33.5*   PLATELETS 10*3/mm3  --  141  --         Results from last 7 days   Lab Units 10/13/23  0606 10/12/23  0657 10/11/23  0448 10/10/23  1357   SODIUM mmol/L 135* 136 140 138   POTASSIUM mmol/L 3.6 3.9 4.2 4.1   CHLORIDE mmol/L 100 103 105 100   CO2 mmol/L 25.0 25.0 25.0 28.0   BUN mg/dL 15 21 16 18   CREATININE mg/dL 0.95 1.13* 0.81 0.92   CALCIUM mg/dL 7.7*  "8.2* 8.4* 9.5   BILIRUBIN mg/dL  --   --   --  0.9   ALK PHOS U/L  --   --   --  143*   ALT (SGPT) U/L  --   --   --  11   AST (SGOT) U/L  --   --   --  19   GLUCOSE mg/dL 124* 146* 135* 144*       Culture Data:   No results found for: \"BLOODCX\", \"URINECX\", \"WOUNDCX\", \"MRSACX\", \"RESPCX\", \"STOOLCX\"    Radiology Data:   Imaging Results (Last 24 Hours)       ** No results found for the last 24 hours. **            I have reviewed the patient's current medications.     Assessment/Plan   Assessment  Active Hospital Problems    Diagnosis     **Right rotator cuff tear arthropathy          Medical Decision Making  Number and Complexity of problems:   Status post right reverse shoulder arthroplasty on October 10  Hypertension-adequately controlled  Chronic systolic heart failure not in decompensation.  Diabetes mellitus type 2 with hyperglycemia, on insulin treated; A1c 6.3%  Chronic anticoagulation.  History of blood clot, paroxysmal atrial fibrillation  Parkinsons disease  Hyperlipidemia  Chronic Hepatitis C  On immunosuppressant Sirolimus per home medication-history of liver transplant in 2003  Postoperative blood loss anemia  Treatment plan  Patient hemodynamically stable and appropriate for discharge to [facility] as per primary care provider.  Blood pressure 141/63 heart rate of 77.  This is the highest blood pressure reading so far.  We will continue on current medication    Conditions and Status  Stable.     MDM Data  External documents reviewed: -  Cardiac tracing (EKG, telemetry) interpretation: -  Radiology interpretation: Nothing new  Labs reviewed: Reviewed  Any tests that were considered but not ordered: None.  Recommend follow-up CBC at swing bed     Decision rules/scores evaluated (example [id number], Wells, etc): None     Discussed with: Patient and caregiver     Care Planning  Shared decision making: Attending and patient  Code status and discussions: Full code    Disposition  Social Determinants " of Health that impact treatment or disposition: None identified  I expect the patient to be discharged to okay to swing bed from my standpoint.    Electronically signed by David Katz MD, 10/13/23, 10:32 CDT.

## 2023-10-13 NOTE — THERAPY DISCHARGE NOTE
Acute Care - Physical Therapy Discharge Summary  [facility]       Patient Name: Dora Velazquez  : 1945  MRN: [id number]    Today's Date: 10/13/2023                 Admit Date: 10/10/2023      PT Recommendation and Plan    Visit Dx:    ICD-10-CM ICD-9-CM   1. Impaired mobility [Z74.09]  Z74.09 799.89   2. Decreased activities of daily living (ADL) [Z78.9]  Z78.9 V49.89        Outcome Measures       Row Name 10/13/23 1000 10/12/23 1100 10/12/23 0924       How much help from another person do you currently need...    Turning from your back to your side while in flat bed without using bedrails? 3  -MS (r) ALYSSIA (t) MS (c) -- 3  -LY    Moving from lying on back to sitting on the side of a flat bed without bedrails? 2  -MS (r) ALYSSIA (t) MS (c) -- 2  -LY    Moving to and from a bed to a chair (including a wheelchair)? 3  -MS (r) ALYSSIA (t) MS (c) -- 3  -LY    Standing up from a chair using your arms (e.g., wheelchair, bedside chair)? 3  -MS (r) ALYSSIA (t) MS (c) -- 3  -LY    Climbing 3-5 steps with a railing? 2  -MS (r) ALYSSIA (t) MS (c) -- 2  -LY    To walk in hospital room? 3  -MS (r) ALYSSIA (t) MS (c) -- 3  -LY    AM-PAC 6 Clicks Score (PT) 16  -MS (r) ALYSSIA (t) -- 16  -LY    Highest level of mobility 5 --> Static standing  -MS (r) ALYSSIA (t) -- 5 --> Static standing  -LY       How much help from another is currently needed...    Putting on and taking off regular lower body clothing? -- 2  -TS --    Bathing (including washing, rinsing, and drying) -- 2  -TS --    Toileting (which includes using toilet bed pan or urinal) -- 3  -TS --    Putting on and taking off regular upper body clothing -- 2  -TS --    Taking care of personal grooming (such as brushing teeth) -- 3  -TS --    Eating meals -- 4  -TS --    AM-PAC 6 Clicks Score (OT) -- 16  -TS --       Functional Assessment    Outcome Measure Options -- -- AM-PAC 6 Clicks Basic Mobility (PT)  -LY              User Key  (r) = Recorded By, (t) = Taken By, (c) = Cosigned By      Initials  Name Provider Type    Cheryl Butcher COTA Occupational Therapist Assistant    MS Nance Meredith R, PT, DPT, NCS Physical Therapist    Anuradha Way, PTA Physical Therapist Assistant    Saurav Crespo, PTA Student PTA Student                     PT Charges       Row Name 10/13/23 1013             Time Calculation    Start Time 0940  -MS (r) ALYSSIA (t) MS (c)      Stop Time 1004  -MS (r) ALYSSIA (t) MS (c)      Time Calculation (min) 24 min  -MS (r) ALYSSIA (t)      PT Received On 10/13/23  -MS (r) ALYSSIA (t) MS (c)      PT Goal Re-Cert Due Date 10/21/23  -MS (r) ALYSSIA (t) MS (c)         Time Calculation- PT    Total Timed Code Minutes- PT 24 minute(s)  -MS (r) ALYSSIA (t) MS (c)                User Key  (r) = Recorded By, (t) = Taken By, (c) = Cosigned By      Initials Name Provider Type    Meredith Garza JORDI, PT, DPT, NCS Physical Therapist    Saurav Crespo, PTA Student PTA Student                     PT Rehab Goals       Row Name 10/13/23 1400             Bed Mobility Goal 1 (PT)    Activity/Assistive Device (Bed Mobility Goal 1, PT) sit to supine/supine to sit  -AB      Alcorn Level/Cues Needed (Bed Mobility Goal 1, PT) contact guard required  -AB      Time Frame (Bed Mobility Goal 1, PT) long term goal (LTG);by discharge  -AB      Progress/Outcomes (Bed Mobility Goal 1, PT) goal not met  -AB         Transfer Goal 1 (PT)    Activity/Assistive Device (Transfer Goal 1, PT) sit-to-stand/stand-to-sit  -AB      Alcorn Level/Cues Needed (Transfer Goal 1, PT) standby assist  -AB      Time Frame (Transfer Goal 1, PT) long term goal (LTG);by discharge  -AB      Progress/Outcome (Transfer Goal 1, PT) goal not met  -AB         Gait Training Goal 1 (PT)    Activity/Assistive Device (Gait Training Goal 1, PT) gait (walking locomotion);assistive device use;decrease fall risk;improve balance and speed;increase endurance/gait distance;cane, quad  -AB      Alcorn Level (Gait Training Goal 1, PT) contact guard  required  -AB      Distance (Gait Training Goal 1, PT) 20  -AB      Time Frame (Gait Training Goal 1, PT) long term goal (LTG);by discharge  -AB      Progress/Outcome (Gait Training Goal 1, PT) goal met  -AB                User Key  (r) = Recorded By, (t) = Taken By, (c) = Cosigned By      Initials Name Provider Type Discipline    Melita Meyer, PTA Physical Therapist Assistant PT                        PT Discharge Summary  Anticipated Discharge Disposition (PT): sub acute care setting  Reason for Discharge: Discharge from facility  Outcomes Achieved: Refer to plan of care for updates on goals achieved  Discharge Destination: Inpatient rehabilitation facility      Melita Talbot PTA   10/13/2023

## 2023-10-13 NOTE — PLAN OF CARE
Goal Outcome Evaluation:  Plan of Care Reviewed With: patient        Progress: improving  Outcome Evaluation: She is alert and oriented x4. She is able to let needs be known. She uses the call bell appropriately. She has LR infusing at 100cc/hr to left forearm without any redness or edema noted. She is up to C x2 assist. Dressing dry and intact to right shoulder with immobilizer in use. Bilateral SCD's in use. She has denied any pain this shift. Plans to discharge to [facility] in [place] if approved by insurance. Will continue to monitor.

## 2023-10-13 NOTE — DISCHARGE SUMMARY
Orthopaedic Surgery Discharge Summary  Bhupinder Hutchison PA-C      NAME: Dora Velazquez  : 1945  MRN: [id number]      Admission Date: 10/10/2023    Discharge Date: 10/13/2023    Admission Diagnosis: Right rotator cuff tear arthropathy [M75.101, M12.811]    Discharge Diagnoses: Same    Procedures: RIGHT REVERSE TOTAL SHOULDER ARTHROPLASTY (Right)    Consultations: Medicine     Reason for Admission: 78 y.o. female with progressive loss of function and increasing pain of the upper extremity due to a massive irreparable tear of the rotator cuff.  She has had multiple cortisone injection of the shoulder over the years.  Due to loss of function and progressive pain, a reverse shoulder arthroplasty is planned to improve function and decrease pain.  She is unhealthy and has a history of a liver transplant, heart failure, and kidney disease.  She understood the significant risk of postop complications but wished to proceed with surgery. An MRI showed atrophy of the rotator cuff musculature.  The patient had an intact axillary nerve and functioning deltoid. Surgical evaluation was discussed and the patient wished to proceed understanding risks, benefits, and alternatives. The surgical indications were to relieve pain, improve function, and prevent future disability in regards to the shoulder pathology dictated in the above diagnoses.    Hospital Course:  The patient was admitted with the above named diagnosis, surgery was performed and tolerated the procedure well.  At the time of discharge, the patient was afebrile, vitals stable, pain was controlled with oral medication, they were tolerating a by mouth diet, and voiding appropriately. Physical therapy and occupational therapy were consulted. Given these findings they were deemed suitable to be discharged.    Disposition: Home    Activity: NWB right upper    Wound Instructions: see postop instructions    Diet: regular diet    Resume home meds: Per  AVS    Prescriptions for:  Serena    Return to Clinic: 1 weeks    Xrays: Yes    Bhupinder Hutchison PA-C

## 2023-10-13 NOTE — PLAN OF CARE
Goal Outcome Evaluation:  Plan of Care Reviewed With: patient, caregiver        Progress: improving  Outcome Evaluation: Pt A&Ox4. Up x2 to BSC. Immobilizer to RUE. DSG CDI. PPP. Denies n/t. C/o of pain w/ PRN pain meds given w/ good relief. Caregiver at BS. Plans to DC to rehab in progress. Call light in reach. Safety maintained.

## 2023-10-13 NOTE — CASE MANAGEMENT/SOCIAL WORK
Continued Stay Note  [facility]     Patient Name: Dora Velazquez  MRN: [id number]  Today's Date: 10/13/2023    Admit Date: 10/10/2023    Plan: [facility]   Discharge Plan       Row Name 10/13/23 0828       Plan    Plan [facility]    Final Discharge Disposition Code 61 - hospital-based swing bed    Final Note Insurance has approved admission to [facility]. Jasbir in admissions confirmed that patient can admit there today but they prefer patient to arrive before 2:00PM. SW notified RONALD Ray. Discharge summary fax: [phone]. RN report [phone].             RONALD Segovia

## 2023-10-13 NOTE — CASE MANAGEMENT/SOCIAL WORK
Spoke to patient's son, Cele, and he is coming here to transport patient to [facility]. Cele is aware that patient needs to be at [facility] by 2:00PM and says that if for some reason he cannot get here in time, he will have patient's caregiver transport her to [facility]. RODRIGO called Jasbir at [facility] and updated her.

## 2023-10-13 NOTE — THERAPY TREATMENT NOTE
Acute Care - Physical Therapy Treatment Note  [facility]     Patient Name: Dora Velazquez  : 1945  MRN: [id number]  Today's Date: 10/13/2023      Visit Dx:     ICD-10-CM ICD-9-CM   1. Impaired mobility [Z74.09]  Z74.09 799.89   2. Decreased activities of daily living (ADL) [Z78.9]  Z78.9 V49.89     Patient Active Problem List   Diagnosis    Paroxysmal atrial fibrillation    (HFpEF) heart failure with preserved ejection fraction    Essential hypertension    Transient cerebral ischemia    Left bundle branch block    Hyperlipidemia    Encounter for colonoscopy due to history of colonic polyp    Recurrent deep vein thrombosis (DVT)    Anemia    Gastrointestinal hemorrhage    Chest pain, unspecified type    CKD (chronic kidney disease) stage 3, GFR 30-59 ml/min    Right rotator cuff tear arthropathy     Past Medical History:   Diagnosis Date    Arthritis     Backache     Bilateral pleural effusion     Blood in feces     Blood in feces symptom    Capsulitis     Cardiomyopathy      HFpEF, improved       Chest pain     CHF (congestive heart failure)     Chronic anemia     Chronic hepatitis C     Degenerative joint disease involving multiple joints     Diabetes mellitus     Dilated cardiomyopathy     Dyslipidemia     Dyspnea     Edema of leg     Epistaxis     Essential hypertension     Fibromyalgia, primary     GERD (gastroesophageal reflux disease)     H/O endoscopy 2014    Colon endoscopy 64938    Headache     Hemorrhoids     internal & external    History of liver recipient     S/P done at [facility]        History of transfusion     Hyperlipemia     Hyperlipidemia     Hypertension     Hypokalemia     Left bundle branch block     Metatarsalgia     Metatarsalgia - with fat pad atrophie       Pap smear for cervical cancer screening 1996    Parkinson's disease     Paroxysmal atrial fibrillation     Polyp of sigmoid colon     Salmonella arthritis     Transient cerebral ischemia     Unspecified      Past Surgical History:   Procedure Laterality Date    BACK SURGERY  1995    Back Surgery (2)       CARDIAC CATHETERIZATION  11/03/2005    Orders Not Yet Performed: 0        CARPAL TUNNEL RELEASE  02/12/2007    Carpal tunnel surgery (1)      CAUTERIZATION NASAL BLEEDERS  03/09/2000    Control nose/throat bleeding (1)       CHOLECYSTECTOMY  2000    COLONOSCOPY N/A 8/14/2019    Procedure: COLONOSCOPY;  Surgeon: Chaparro Mckeon MD;  Location: [facility] ENDOSCOPY;  Service: Gastroenterology    LAPAROSCOPIC LYSIS OF ADHESIONS  07/16/1992    Laparoscopy; lysis of adhesions (1)       LIVER BIOPSY  01/05/1998    Needle biopsy of liver 77595 (1)       LIVER TRANSPLANTATION  2003    Anesth, for liver transplant (1)       SALPINGO OOPHORECTOMY  1974    Salpingo-oophorectomy (1)       SHOULDER SURGERY  08/29/2007    Shoulder surgery procedure (1)       TOTAL ABDOMINAL HYSTERECTOMY  1968    Total abd hysterectomy (1)       TOTAL SHOULDER ARTHROPLASTY W/ DISTAL CLAVICLE EXCISION Right 10/10/2023    Procedure: RIGHT REVERSE TOTAL SHOULDER ARTHROPLASTY;  Surgeon: Frantz Rosa MD;  Location: [facility] OR;  Service: Orthopedics;  Laterality: Right;     PT Assessment (last 12 hours)       PT Evaluation and Treatment       Row Name 10/13/23 0940          Physical Therapy Time and Intention    Subjective Information no complaints (P)   -     Document Type therapy note (daily note) (P)   -ALYSSIA     Mode of Treatment physical therapy (P)   -ALYSSIA       Row Name 10/13/23 0940          General Information    Existing Precautions/Restrictions fall;non-weight bearing;right;shoulder (P)   -       Row Name 10/13/23 0940          Pain    Pretreatment Pain Rating 0/10 - no pain (P)   -ALYSSIA     Posttreatment Pain Rating 0/10 - no pain (P)   -       Row Name 10/13/23 0940          Bed Mobility    Comment, (Bed Mobility) Pt up in chair. (P)   -       Row Name 10/13/23 0980          Transfers    Transfers sit-stand transfer;stand-sit transfer  (P)   -ALYSSIA       Row Name 10/13/23 0940          Sit-Stand Transfer    Sit-Stand Fourmile (Transfers) contact guard;minimum assist (75% patient effort);verbal cues (P)   -ALYSSIA     Assistive Device (Sit-Stand Transfers) cane, quad (P)   -ALYSSIA     Comment, (Sit-Stand Transfer) Cues for UE use during transfers. (P)   -ALYSSIA       Row Name 10/13/23 0940          Stand-Sit Transfer    Stand-Sit Fourmile (Transfers) contact guard;verbal cues (P)   -ALYSSIA     Assistive Device (Stand-Sit Transfers) cane, quad (P)   -ALYSSIA       Row Name 10/13/23 0940          Gait/Stairs (Locomotion)    Fourmile Level (Gait) verbal cues;contact guard (P)   -ALYSSIA     Assistive Device (Gait) cane, quad (P)   -ALYSSIA     Distance in Feet (Gait) 20' x 2 (P)   -ALYSSIA     Deviations/Abnormal Patterns (Gait) base of support, narrow;gait speed decreased;stride length decreased (P)   -ALYSSIA     Bilateral Gait Deviations forward flexed posture (P)   -ALYSSIA     Comment, (Gait/Stairs) Pt unable to correct posture. (P)   -ALYSSIA       Row Name 10/13/23 0940          Motor Skills    Comments, Therapeutic Exercise BLE AROM in sitting and reclined x 15 (P)   -ALYSSIA       Row Name             Wound 10/10/23 1912 Right axilla Incision    Wound - Properties Group Placement Date: 10/10/23  -CB Placement Time: 1912  -CB Present on Original Admission: N  -CB Side: Right  -CB Location: axilla  -CB Primary Wound Type: Incision  -CB    Retired Wound - Properties Group Placement Date: 10/10/23  -CB Placement Time: 1912  -CB Present on Original Admission: N  -CB Side: Right  -CB Location: axilla  -CB Primary Wound Type: Incision  -CB    Retired Wound - Properties Group Date first assessed: 10/10/23  -CB Time first assessed: 1912  -CB Present on Original Admission: N  -CB Side: Right  -CB Location: axilla  -CB Primary Wound Type: Incision  -CB      Row Name 10/13/23 0940          Plan of Care Review    Plan of Care Reviewed With patient (P)   -ALYSSIA     Progress improving (P)   -ALYSSIA     Outcome  Evaluation Pt with no complaints of pain. Pt up in chair. Sit <> stand <> sit CGA/min A, cues required for UE use during transfers. Pt amb in room 20' x 2 with QC, decreased step length and narrow TORRI. Pt amb with forward flexed posture, unable to correct with cues. BLE AROM in sitting and reclined x 15. (P)   -ALYSSIA       Row Name 10/13/23 0940          Positioning and Restraints    Pre-Treatment Position sitting in chair/recliner (P)   -ALYSSIA     Post Treatment Position chair (P)   -ALYSSIA     In Chair reclined;call light within reach;with family/caregiver (P)   -ALYSSIA               User Key  (r) = Recorded By, (t) = Taken By, (c) = Cosigned By      Initials Name Provider Type    Jose Cuevas, RN Registered Nurse    Saurav Crespo, PTA Student PTA Student                    Physical Therapy Education       Title: PT OT SLP Therapies (Done)       Topic: Physical Therapy (Done)       Point: Mobility training (Done)       Learning Progress Summary             Patient Acceptance, E,D, DU,VU by ALYSSIA at 10/13/2023 1014    Acceptance, E, VU,DU by LIAM at 10/11/2023 1410    Comment: Verbal cueing for transfers with quad cane. Verbal cueing for sequencing with gait and for posture. Brace donning/doffing/wear schedule. WB precautions RUE.                         Point: Body mechanics (Done)       Learning Progress Summary             Patient Acceptance, E,D, DU,VU by ALYSSIA at 10/13/2023 1014    Acceptance, E, VU,DU by LIAM at 10/11/2023 1410    Comment: Verbal cueing for transfers with quad cane. Verbal cueing for sequencing with gait and for posture. Brace donning/doffing/wear schedule. WB precautions RUE.                         Point: Precautions (Done)       Learning Progress Summary             Patient Acceptance, E,D, DU,VU by ALYSSIA at 10/13/2023 1014    Acceptance, E, VU,DU by LIAM at 10/11/2023 1410    Comment: Verbal cueing for transfers with quad cane. Verbal cueing for sequencing with gait and for posture. Brace  donning/doffing/wear schedule. WB precautions RUE.                                         User Key       Initials Effective Dates Name Provider Type Discipline     07/13/23 -  Saurav Moise, PT Student PT Student PT    ALYSSIA 09/26/23 -  Saurav Donato, PTA Student PTA Student PT                  PT Recommendation and Plan     Plan of Care Reviewed With: (P) patient  Progress: (P) improving  Outcome Evaluation: (P) Pt with no complaints of pain. Pt up in chair. Sit <> stand <> sit CGA/min A, cues required for UE use during transfers. Pt amb in room 20' x 2 with QC, decreased step length and narrow TORRI. Pt amb with forward flexed posture, unable to correct with cues. BLE AROM in sitting and reclined x 15.   Outcome Measures       Row Name 10/13/23 1000 10/12/23 1100 10/12/23 0924       How much help from another person do you currently need...    Turning from your back to your side while in flat bed without using bedrails? 3 (P)   -ALYSSIA -- 3  -LY    Moving from lying on back to sitting on the side of a flat bed without bedrails? 2 (P)   -ALYSSIA -- 2  -LY    Moving to and from a bed to a chair (including a wheelchair)? 3 (P)   -ALYSSIA -- 3  -LY    Standing up from a chair using your arms (e.g., wheelchair, bedside chair)? 3 (P)   -ALYSSIA -- 3  -LY    Climbing 3-5 steps with a railing? 2 (P)   -ALYSSIA -- 2  -LY    To walk in hospital room? 3 (P)   -ALYSSIA -- 3  -LY    AM-PAC 6 Clicks Score (PT) 16 (P)   -ALYSSIA -- 16  -LY    Highest level of mobility 5 --> Static standing (P)   -ALYSSIA -- 5 --> Static standing  -LY       How much help from another is currently needed...    Putting on and taking off regular lower body clothing? -- 2  -TS --    Bathing (including washing, rinsing, and drying) -- 2  -TS --    Toileting (which includes using toilet bed pan or urinal) -- 3  -TS --    Putting on and taking off regular upper body clothing -- 2  -TS --    Taking care of personal grooming (such as brushing teeth) -- 3  -TS --    Eating meals -- 4  -TS --     AM-PAC 6 Clicks Score (OT) -- 16  -TS --       Functional Assessment    Outcome Measure Options -- -- AM-PAC 6 Clicks Basic Mobility (PT)  -LY              User Key  (r) = Recorded By, (t) = Taken By, (c) = Cosigned By      Initials Name Provider Type    TS Cheryl Maurice, [employer] Occupational Therapist Assistant    Anuradha Way, PTA Physical Therapist Assistant    Saurav Crespo, PTA Student PTA Student                     Time Calculation:    PT Charges       Row Name 10/13/23 1013             Time Calculation    Start Time 0940 (P)   -ALYSSIA      Stop Time 1004 (P)   -ALYSSIA      Time Calculation (min) 24 min (P)   -ALYSSIA      PT Received On 10/13/23 (P)   -ALYSSIA      PT Goal Re-Cert Due Date 10/21/23 (P)   -ALYSSIA         Time Calculation- PT    Total Timed Code Minutes- PT 24 minute(s) (P)   -ALYSSIA                User Key  (r) = Recorded By, (t) = Taken By, (c) = Cosigned By      Initials Name Provider Type    Saurav Crespo PTA Student PTA Student                      PT G-Codes  Outcome Measure Options: AM-PAC 6 Clicks Basic Mobility (PT)  AM-PAC 6 Clicks Score (PT): (P) 16  AM-PAC 6 Clicks Score (OT): 16    Saurav Donato PTA Student  10/13/2023

## 2023-10-14 NOTE — THERAPY DISCHARGE NOTE
Acute Care - Occupational Therapy Discharge Summary  [facility]     Patient Name: Dora Velazquez  : 1945  MRN: [id number]    Today's Date: 10/14/2023                 Admit Date: 10/10/2023        OT Recommendation and Plan    Visit Dx:    ICD-10-CM ICD-9-CM   1. Impaired mobility [Z74.09]  Z74.09 799.89   2. Decreased activities of daily living (ADL) [Z78.9]  Z78.9 V49.89                OT Rehab Goals       Row Name 10/14/23 1300             Transfer Goal 1 (OT)    Activity/Assistive Device (Transfer Goal 1, OT) commode, bedside without drop arms  -LS      Miner Level/Cues Needed (Transfer Goal 1, OT) supervision required  -LS      Time Frame (Transfer Goal 1, OT) long term goal (LTG);10 days  -LS      Progress/Outcome (Transfer Goal 1, OT) goal not met  -LS         Dressing Goal 1 (OT)    Activity/Device (Dressing Goal 1, OT) dressing skills, all  -LS      Miner/Cues Needed (Dressing Goal 1, OT) moderate assist (50-74% patient effort)  -LS      Time Frame (Dressing Goal 1, OT) long term goal (LTG);10 days  -LS      Progress/Outcome (Dressing Goal 1, OT) goal not met  -LS         Toileting Goal 1 (OT)    Activity/Device (Toileting Goal 1, OT) toileting skills, all  -LS      Miner Level/Cues Needed (Toileting Goal 1, OT) minimum assist (75% or more patient effort)  -LS      Time Frame (Toileting Goal 1, OT) long term goal (LTG);10 days  -LS      Progress/Outcome (Toileting Goal 1, OT) goal not met  -LS                User Key  (r) = Recorded By, (t) = Taken By, (c) = Cosigned By      Initials Name Provider Type Discipline    LS Anuradha Sanchez, OTR/L Occupational Therapist OT                     Outcome Measures       Row Name 10/13/23 1000 10/12/23 1100 10/12/23 0924       How much help from another person do you currently need...    Turning from your back to your side while in flat bed without using bedrails? 3  -MS (r) ALYSSIA (t) MS (c) -- 3  -LY    Moving from lying on back to sitting on  the side of a flat bed without bedrails? 2  -MS (r) ALYSSIA (t) MS (c) -- 2  -LY    Moving to and from a bed to a chair (including a wheelchair)? 3  -MS (r) ALYSSIA (t) MS (c) -- 3  -LY    Standing up from a chair using your arms (e.g., wheelchair, bedside chair)? 3  -MS (r) ALYSSIA (t) MS (c) -- 3  -LY    Climbing 3-5 steps with a railing? 2  -MS (r) ALYSSIA (t) MS (c) -- 2  -LY    To walk in hospital room? 3  -MS (r) ALYSSIA (t) MS (c) -- 3  -LY    AM-PAC 6 Clicks Score (PT) 16  -MS (r) ALYSSIA (t) -- 16  -LY    Highest level of mobility 5 --> Static standing  -MS (r) ALYSSIA (t) -- 5 --> Static standing  -LY       How much help from another is currently needed...    Putting on and taking off regular lower body clothing? -- 2  -TS --    Bathing (including washing, rinsing, and drying) -- 2  -TS --    Toileting (which includes using toilet bed pan or urinal) -- 3  -TS --    Putting on and taking off regular upper body clothing -- 2  -TS --    Taking care of personal grooming (such as brushing teeth) -- 3  -TS --    Eating meals -- 4  -TS --    AM-PAC 6 Clicks Score (OT) -- 16  -TS --       Functional Assessment    Outcome Measure Options -- -- AM-PAC 6 Clicks Basic Mobility (PT)  -LY              User Key  (r) = Recorded By, (t) = Taken By, (c) = Cosigned By      Initials Name Provider Type    TS Cheryl Maurice, [employer] Occupational Therapist Assistant    Meredith Garza, PT, DPT, NCS Physical Therapist    Anuradha Way, PTA Physical Therapist Assistant    Saurav Crespo, CAROLYN Student PTA Student                    Timed Therapy Charges  Total Units: 5      Suggested Charges  Total Units: 5      Procedure Name Documented Minutes Units Code    HC OT SELF CARE/MGMT/TRAIN EA 15 MIN 69 5   97480 (CPT®)                 Documented Minutes  Total Minutes: 69      Therapy Provided Minutes    63729 - OT Self Care/Mgmt Minutes 69                        OT Discharge Summary  Anticipated Discharge Disposition (OT): sub acute care setting  Reason  for Discharge: Discharge from facility  Outcomes Achieved: Refer to plan of care for updates on goals achieved  Discharge Destination: other (comment) (swing bed)      Anuradha Sanchez OTR/REGINALD  10/14/2023

## 2023-11-02 DIAGNOSIS — G20.A1 PARKINSON DISEASE: ICD-10-CM

## 2023-11-02 RX ORDER — GABAPENTIN 100 MG/1
100 CAPSULE ORAL NIGHTLY
Qty: 30 CAPSULE | Refills: 0 | Status: SHIPPED | OUTPATIENT
Start: 2023-11-02 | End: 2023-12-02

## 2023-11-02 NOTE — TELEPHONE ENCOUNTER
Requested Prescriptions     Pending Prescriptions Disp Refills    gabapentin (NEURONTIN) 100 MG capsule 30 capsule 0     Sig: Take 1 capsule by mouth nightly for 30 days.  Max Daily Amount: 100 mg       Last Office Visit:  6/12/2023  Next Office Visit:  12/22/2023  Last Medication Refill:  6/12/2023 with 3 RF   Austin Mendoza up to date:  11/2/2023     *RX updated to reflect   11/2/2023  fill date*

## 2023-11-03 DIAGNOSIS — G20.A1 PARKINSON DISEASE: ICD-10-CM

## 2023-11-06 NOTE — TELEPHONE ENCOUNTER
Requested Prescriptions     Pending Prescriptions Disp Refills    carbidopa-levodopa (SINEMET)  MG per tablet 240 tablet 5     Sig: Take 2 pills 3 times daily       Last Office Visit: 6/12/2023  Next Office Visit: 12/22/2023  Last Medication Refill: 1/12/2023 with 5 RF

## 2023-11-30 DIAGNOSIS — G20.A1 PARKINSON DISEASE: ICD-10-CM

## 2023-11-30 RX ORDER — GABAPENTIN 100 MG/1
100 CAPSULE ORAL NIGHTLY
Qty: 30 CAPSULE | Refills: 0 | Status: SHIPPED | OUTPATIENT
Start: 2023-11-30 | End: 2023-12-30

## 2023-11-30 NOTE — TELEPHONE ENCOUNTER
Requested Prescriptions     Pending Prescriptions Disp Refills    gabapentin (NEURONTIN) 100 MG capsule 30 capsule 0     Sig: Take 1 capsule by mouth nightly for 30 days.  Max Daily Amount: 100 mg       Last Office Visit:  6/12/2023  Next Office Visit:  12/22/2023  Last Medication Refill:  11/2/23   Carol Rubi up to date:  11/2/23    *RX updated to reflect   12/2/23  fill date*

## 2024-01-05 DIAGNOSIS — G20.A1 PARKINSON DISEASE: ICD-10-CM

## 2024-01-08 RX ORDER — GABAPENTIN 100 MG/1
100 CAPSULE ORAL NIGHTLY
Qty: 30 CAPSULE | Refills: 0 | Status: SHIPPED | OUTPATIENT
Start: 2024-01-08 | End: 2024-02-07

## 2024-01-08 RX ORDER — GABAPENTIN 100 MG/1
100 CAPSULE ORAL NIGHTLY
Qty: 30 CAPSULE | Refills: 0 | OUTPATIENT
Start: 2024-01-08 | End: 2024-02-07

## 2024-01-08 NOTE — TELEPHONE ENCOUNTER
Requested Prescriptions     Pending Prescriptions Disp Refills    gabapentin (NEURONTIN) 100 MG capsule 30 capsule 0     Sig: Take 1 capsule by mouth nightly for 30 days. Max Daily Amount: 100 mg       Last Office Visit:  12/22/2023  Next Office Visit:  1/5/2024  Last Medication Refill:  11/30/23  Petar up to date:  1/8/24    *RX updated to reflect   1/8/24  fill date*

## 2025-03-11 NOTE — PATIENT INSTRUCTIONS

## (undated) DEVICE — BNDG GZ SOF-FORM CONFRM 2X75IN LF STRL

## (undated) DEVICE — T-MAX DISPOSABLE FACE MASK 8 PER BOX

## (undated) DEVICE — TRAP FLD MINIVAC MEGADYNE 100ML

## (undated) DEVICE — GLV SURG SENSICARE PI ORTHO SZ8 LF STRL

## (undated) DEVICE — DRP SURG U/DRP INVISISHIELD PA 48X52IN

## (undated) DEVICE — TBG PENCL TELESCP MEGADYNE SMOKE EVAC 10FT

## (undated) DEVICE — BAPTIST TURNOVER KIT: Brand: MEDLINE INDUSTRIES, INC.

## (undated) DEVICE — DUAL CUT SAGITTAL BLADE

## (undated) DEVICE — SHOULDER STABILIZATION KIT,                                    DISPOSABLE 12 PER BOX

## (undated) DEVICE — SPNG GZ STRL 2S 4X4 12PLY

## (undated) DEVICE — 3M™ IOBAN™ 2 ANTIMICROBIAL INCISE DRAPE 6651EZ: Brand: IOBAN™ 2

## (undated) DEVICE — SPONGE,LAP,12"X12",XR,ST,5/PK,40PK/CS: Brand: MEDLINE

## (undated) DEVICE — OPTIFOAM GENTLE SA, POSTOP, 4X8: Brand: MEDLINE

## (undated) DEVICE — 4-PORT MANIFOLD: Brand: NEPTUNE 2

## (undated) DEVICE — GLV SURG DERMASSURE GRN LF PF 8.0

## (undated) DEVICE — SUT ETHIB 5 V37 30IN MB66G

## (undated) DEVICE — ST PIN FIX TEMP UNIVERS REVERS W/OSTEO/GUIDE/PIN 2.4MM STRL

## (undated) DEVICE — ANTIBACTERIAL UNDYED BRAIDED (POLYGLACTIN 910), SYNTHETIC ABSORBABLE SUTURE: Brand: COATED VICRYL

## (undated) DEVICE — Device: Brand: DISPOSABLE ELECTROSURGICAL SNARE

## (undated) DEVICE — Device

## (undated) DEVICE — TRAP SXN POLYP QUICKCATCH LF

## (undated) DEVICE — INTENDED FOR TISSUE SEPARATION, AND OTHER PROCEDURES THAT REQUIRE A SHARP SURGICAL BLADE TO PUNCTURE OR CUT.: Brand: BARD-PARKER ® STAINLESS STEEL BLADES

## (undated) DEVICE — HANDPIECE SET WITH HIGH FLOW TIP AND SUCTION TUBE: Brand: INTERPULSE

## (undated) DEVICE — ADHS SKIN PREMIERPRO EXOFIN TOPICAL HI/VISC .5ML

## (undated) DEVICE — BIPOLAR SEALER 23-112-1 AQM 6.0: Brand: AQUAMANTYS™

## (undated) DEVICE — CANN SMPL SOFTECH BIFLO ETCO2 A/M 7FT

## (undated) DEVICE — PK SHLDR 30